# Patient Record
Sex: MALE | Race: WHITE | NOT HISPANIC OR LATINO | Employment: OTHER | URBAN - METROPOLITAN AREA
[De-identification: names, ages, dates, MRNs, and addresses within clinical notes are randomized per-mention and may not be internally consistent; named-entity substitution may affect disease eponyms.]

---

## 2017-01-13 NOTE — TELEPHONE ENCOUNTER
LOV 10/16. At that time patient was taking Glucophage XR 750mg tid. He is now requesting a refill for Metformin 1000 mg bid.  He has not returned my phone calls to clarify dosage with him. Please advise.

## 2017-01-14 ENCOUNTER — APPOINTMENT (OUTPATIENT)
Dept: RADIOLOGY | Facility: IMAGING CENTER | Age: 62
End: 2017-01-14
Attending: FAMILY MEDICINE
Payer: OTHER MISCELLANEOUS

## 2017-01-14 ENCOUNTER — OFFICE VISIT (OUTPATIENT)
Dept: URGENT CARE | Facility: PHYSICIAN GROUP | Age: 62
End: 2017-01-14
Payer: OTHER MISCELLANEOUS

## 2017-01-14 VITALS
DIASTOLIC BLOOD PRESSURE: 72 MMHG | HEART RATE: 88 BPM | SYSTOLIC BLOOD PRESSURE: 132 MMHG | OXYGEN SATURATION: 96 % | BODY MASS INDEX: 27 KG/M2 | TEMPERATURE: 97.9 F | WEIGHT: 168 LBS | HEIGHT: 66 IN | RESPIRATION RATE: 16 BRPM

## 2017-01-14 DIAGNOSIS — T14.8XXA FOREIGN BODY IN SKIN: ICD-10-CM

## 2017-01-14 DIAGNOSIS — L03.019 FELON OF FINGER: ICD-10-CM

## 2017-01-14 PROCEDURE — 99214 OFFICE O/P EST MOD 30 MIN: CPT | Performed by: FAMILY MEDICINE

## 2017-01-14 PROCEDURE — 73140 X-RAY EXAM OF FINGER(S): CPT | Mod: TC,LT | Performed by: FAMILY MEDICINE

## 2017-01-14 RX ORDER — CLINDAMYCIN HYDROCHLORIDE 300 MG/1
300 CAPSULE ORAL 3 TIMES DAILY
Qty: 21 CAP | Refills: 0 | Status: SHIPPED | OUTPATIENT
Start: 2017-01-14 | End: 2017-01-21

## 2017-01-14 RX ORDER — IBUPROFEN 600 MG/1
600 TABLET ORAL EVERY 8 HOURS PRN
Qty: 20 TAB | Refills: 0 | Status: SHIPPED | OUTPATIENT
Start: 2017-01-14 | End: 2018-08-27

## 2017-01-14 ASSESSMENT — ENCOUNTER SYMPTOMS
HEMOPTYSIS: 0
SHORTNESS OF BREATH: 0
FEVER: 0
FOCAL WEAKNESS: 0
CHILLS: 0
DIZZINESS: 0
ORTHOPNEA: 0

## 2017-01-14 NOTE — PROGRESS NOTES
"Subjective:      Jerome eMndoza is a 61 y.o. male who presents with Foreign Body in Skin    Chief Complaint   Patient presents with   • Foreign Body in Skin     left hand middle finger, swelling pt felt a fine object under skin before the swelling        - Lt middle finger w/ selling and redness. Thinks he has a FB in it. No trauma or known injury though.     - Hx DM, says he is well controlled on current meds               Foreign Body in Skin  Pertinent negatives include no chest pain, chills or fever.       Review of Systems   Constitutional: Negative for fever and chills.   Respiratory: Negative for hemoptysis and shortness of breath.    Cardiovascular: Negative for chest pain and orthopnea.   Neurological: Negative for dizziness and focal weakness.          Objective:     /72 mmHg  Pulse 88  Temp(Src) 36.6 °C (97.9 °F)  Resp 16  Ht 1.676 m (5' 5.98\")  Wt 76.204 kg (168 lb)  BMI 27.13 kg/m2  SpO2 96%     Physical Exam   Constitutional: He appears well-developed. No distress.   HENT:   Head: Normocephalic and atraumatic.   Cardiovascular: Regular rhythm.    No murmur heard.  Pulmonary/Chest: Effort normal and breath sounds normal.   Neurological: He is alert.   Skin: Skin is warm and dry.   Psychiatric: He has a normal mood and affect. Judgment normal.   Nursing note and vitals reviewed.  Lt middle finger: distal volar pad w/ some edema erythema, no PW marks or signs of FB. Suspect this is more of an early Felon formation             Assessment/Plan:         1. Foreign body in skin  DX-FINGER(S) 2+ LEFT    clindamycin (CLEOCIN) 300 MG Cap    ibuprofen (MOTRIN) 600 MG Tab    REFERRAL TO ORTHOPEDICS   2. Felon of finger  clindamycin (CLEOCIN) 300 MG Cap    ibuprofen (MOTRIN) 600 MG Tab    REFERRAL TO ORTHOPEDICS     Xray: no acute findings by MY READ. RADIOLOGY READ PENDING.   Warm soaks, abx, elevate limb.  2 day recheck       Dx & d/c instructions discussed w/ patient and/or family members. Follow up " w/ Prvt Dr or here in 3-4 days if not getting better, sooner if needed,  ER if worse and UC/PCP unavailable.        Possible side effects (i.e. Rash, GI upset/constipation, sedation, elevation of BP or sugars) of any medications given discussed.

## 2017-01-14 NOTE — MR AVS SNAPSHOT
"        Jerome Mendoza   2017 9:10 AM   Office Visit   MRN: [id number]    Department:  [facility]   Dept Phone:  [phone]    Description:  Male : 1955   Provider:  Enrique Holliday M.D.           Reason for Visit     Foreign Body in Skin left hand middle finger, swelling pt felt a fine object under skin before the swelling      Allergies as of 2017     Allergen Noted Reactions    Penicillin G 2014   Swelling      You were diagnosed with     Foreign body in skin   [[id number]]       Felon of finger   [[id number]]         Vital Signs     Blood Pressure Pulse Temperature Respirations Height Weight    132/72 mmHg 88 36.6 °C (97.9 °F) 16 1.676 m (5' 5.98\") 76.204 kg (168 lb)    Body Mass Index Oxygen Saturation Smoking Status             27.13 kg/m2 96% Never Smoker          Basic Information     Date Of Birth Sex Race Ethnicity Preferred Language    1955 Male White Non- English      Your appointments     Mar 16, 2017  8:00 AM   Diabetes Care Visit with Melissa Eli D.O., [place] [facility] RN   [facility])    [address], [address]  [place] [id number] [phone]           You will be receiving a confirmation call a few days before your appointment from our automated call confirmation system.              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   10/20/2016 - Present      Health Maintenance        Date Due Completion Dates    COLON CANCER SCREENING " ANNUAL FIT 1955 ---    IMM DTaP/Tdap/Td Vaccine (1 - Tdap) 10/26/1972 ---    IMM ZOSTER VACCINE 10/26/2013 ---    A1C SCREENING 4/20/2017 10/20/2016, 7/18/2016, 11/30/2015, 7/13/2015, 1/26/2015, 9/2/2014    FASTING LIPID PROFILE 7/18/2017 7/18/2016, 11/30/2015, 1/26/2015, 9/2/2014    URINE ACR / MICROALBUMIN 7/18/2017 7/18/2016, 11/30/2015, 9/2/2014    SERUM CREATININE 7/18/2017 7/18/2016, 11/30/2015, 9/2/2014    RETINAL SCREENING 8/2/2017 8/2/2016, 5/2/2015    DIABETES MONOFILAMTENT / LE EXAM 10/20/2017 10/20/2016, 12/7/2015            Current Immunizations     Influenza Vaccine Quad Inj (Preserved) 10/20/2016    Pneumococcal polysaccharide vaccine (PPSV-23) 10/20/2016      Below and/or attached are the medications your provider expects you to take. Review all of your home medications and newly ordered medications with your provider and/or pharmacist. Follow medication instructions as directed by your provider and/or pharmacist. Please keep your medication list with you and share with your provider. Update the information when medications are discontinued, doses are changed, or new medications (including over-the-counter products) are added; and carry medication information at all times in the event of emergency situations     Allergies:  PENICILLIN G - Swelling               Medications  Valid as of: January 14, 2017 - 10:44 AM    Generic Name Brand Name Tablet Size Instructions for use    Albuterol Sulfate (Aero Soln) VENTOLIN  (90 BASE) MCG/ACT INHALE TWO PUFFS BY MOUTH EVERY 6 HOURS AS NEEDED FOR SHORTNESS OF BREATH        Aspirin (Tablet Delayed Response) ECOTRIN 81 MG Take 162 mg by mouth every day.        Cholecalciferol (Cap) Vitamin D3 2000 UNIT Take  by mouth.        Clindamycin HCl (Cap) CLEOCIN 300 MG Take 1 Cap by mouth 3 times a day for 7 days.        GlipiZIDE (Tab) GLUCOTROL 10 MG TAKE ONE TABLET BY MOUTH TWICE DAILY        Ibuprofen (Tab) MOTRIN 600 MG Take 1 Tab by mouth every 8 hours  as needed.        Lisinopril (Tab) PRINIVIL 2.5 MG Take 1 Tab by mouth every day.        Lovastatin (Tab) MEVACOR 40 MG Take 1 Tab by mouth every day.        MetFORMIN HCl (TABLET SR 24 HR) GLUCOPHAGE  MG Take 1 Tab by mouth 3 times a day.        MetFORMIN HCl (Tab) GLUCOPHAGE 1000 MG TAKE ONE TABLET BY MOUTH TWICE DAILY WITH FOOD        Mometasone Furoate (AEROSOL POWDER, BREATH ACTIVATED) ASMANEX 220 MCG/INH Inhale 2 Puffs by mouth every day.        Pioglitazone HCl (Tab) ACTOS 45 MG Take 1 Tab by mouth every day.        SAXagliptin HCl (Tab) SAXagliptin HCl 5 MG Take 1 Tab by mouth 1 time daily as needed.        .                 Medicines prescribed today were sent to:     [facility] [address] [place] [address] - [address] [place] [address]    Phone: [phone] Fax: [phone]    Open 24 Hours?: No      Medication refill instructions:       If your prescription bottle indicates you have medication refills left, it is not necessary to call your provider’s office. Please contact your pharmacy and they will refill your medication.    If your prescription bottle indicates you do not have any refills left, you may request refills at any time through one of the following ways: The online [email or web address] system (except Urgent Care), by calling your provider’s office, or by asking your pharmacy to contact your provider’s office with a refill request. Medication refills are processed only during regular business hours and may not be available until the next business day. Your provider may request additional information or to have a follow-up visit with you prior to refilling your medication.   *Please Note: Medication refills are assigned a new Rx number when refilled electronically. Your pharmacy may indicate that no refills were authorized even though a new prescription for the same medication is available at the pharmacy. Please request the medicine by name with the pharmacy before  contacting your provider for a refill.        Your To Do List     Future Labs/Procedures Complete By Expires    DX-FINGER(S) 2+ LEFT  As directed 1/14/2018      Referral     A referral request has been sent to our patient care coordination department. Please allow 3-5 business days for us to process this request and contact you either by phone or mail. If you do not hear from us by the 5th business day, please call us at [phone].           Loudcaster Access Code: [email or web address]  Expires: 2/6/2017  8:21 AM    Loudcaster  A secure, online tool to manage your health information     [employer]’s Loudcaster® is a secure, online tool that connects you to your personalized health information from the privacy of your home -- day or night - making it very easy for you to manage your healthcare. Once the activation process is completed, you can even access your medical information using the Loudcaster luis, which is available for free in the Apple Luis store or Google Play store.     Loudcaster provides the following levels of access (as shown below):   My Chart Features   [facility] Primary Care Doctor [facility]  Specialists [facility]  Urgent  Care Non-[facility]  Primary Care  Doctor   Email your healthcare team securely and privately 24/7 X X X    Manage appointments: schedule your next appointment; view details of past/upcoming appointments X      Request prescription refills. X      View recent personal medical records, including lab and immunizations X X X X   View health record, including health history, allergies, medications X X X X   Read reports about your outpatient visits, procedures, consult and ER notes X X X X   See your discharge summary, which is a recap of your hospital and/or ER visit that includes your diagnosis, lab results, and care plan. X X       How to register for Loudcaster:  1. Go to  [email or web address].  2. Click on the Sign Up Now box, which takes you to the New Member Sign Up page. You will need to provide  the following information:  a. Enter your MadeClose Access Code exactly as it appears at the top of this page. (You will not need to use this code after you’ve completed the sign-up process. If you do not sign up before the expiration date, you must request a new code.)   b. Enter your date of birth.   c. Enter your home email address.   d. Click Submit, and follow the next screen’s instructions.  3. Create a MadeClose ID. This will be your MadeClose login ID and cannot be changed, so think of one that is secure and easy to remember.  4. Create a MadeClose password. You can change your password at any time.  5. Enter your Password Reset Question and Answer. This can be used at a later time if you forget your password.   6. Enter your e-mail address. This allows you to receive e-mail notifications when new information is available in MadeClose.  7. Click Sign Up. You can now view your health information.    For assistance activating your MadeClose account, call [phone]

## 2017-01-23 NOTE — TELEPHONE ENCOUNTER
Was the patient seen in the last year in this department? Yes     Does patient have an active prescription for medications requested? No     Received Request Via: Patient     PT HAS BEEN GETTING TEST STRIPS FROM OTHER MEANS. HE CAN NOT AFFORD THOSE MEANS SO HE WOULD LIKE TO BE PRESCRIBE ONE TOUCH ULTRA TEST STRIPS.  PLEASE EDIT RX. SEND TO [facility]. THANK YOU!

## 2017-02-22 NOTE — TELEPHONE ENCOUNTER
Was the patient seen in the last year in this department? Yes     Does patient have an active prescription for medications requested? No     Received Request Via: Pharmacy      Pt met protocol?: Yes, LABS 7/16 A1C 8.5 OV 1/17,  SENT RX TO PHARMACY #90 WITH 4 REFILLS SIG:BID,PHARMACY IS ASKING FOR #180 FOR 90 DS WITH REFILLS

## 2017-03-06 ENCOUNTER — HOSPITAL ENCOUNTER (OUTPATIENT)
Dept: LAB | Facility: MEDICAL CENTER | Age: 62
End: 2017-03-06
Attending: FAMILY MEDICINE
Payer: OTHER MISCELLANEOUS

## 2017-03-06 LAB
CHOLEST SERPL-MCNC: 171 MG/DL (ref 100–199)
HDLC SERPL-MCNC: 43 MG/DL
LDLC SERPL CALC-MCNC: 104 MG/DL
TRIGL SERPL-MCNC: 122 MG/DL (ref 0–149)

## 2017-03-06 PROCEDURE — 36415 COLL VENOUS BLD VENIPUNCTURE: CPT

## 2017-03-06 PROCEDURE — 80061 LIPID PANEL: CPT

## 2017-04-06 ENCOUNTER — TELEPHONE (OUTPATIENT)
Dept: MEDICAL GROUP | Facility: PHYSICIAN GROUP | Age: 62
End: 2017-04-06

## 2017-04-07 NOTE — TELEPHONE ENCOUNTER
Pt lm asking if he could receive results of his A1c test done 10-20-16. Informed him he could present to our location and we could print for him. Let him know that he may need to fill out some paperwork.

## 2017-04-23 ENCOUNTER — TELEPHONE (OUTPATIENT)
Dept: URGENT CARE | Facility: PHYSICIAN GROUP | Age: 62
End: 2017-04-23

## 2017-04-23 ENCOUNTER — OFFICE VISIT (OUTPATIENT)
Dept: URGENT CARE | Facility: CLINIC | Age: 62
End: 2017-04-23

## 2017-04-23 VITALS
DIASTOLIC BLOOD PRESSURE: 80 MMHG | TEMPERATURE: 97.8 F | BODY MASS INDEX: 26.68 KG/M2 | SYSTOLIC BLOOD PRESSURE: 132 MMHG | HEART RATE: 74 BPM | HEIGHT: 66 IN | WEIGHT: 166 LBS | RESPIRATION RATE: 14 BRPM | OXYGEN SATURATION: 97 %

## 2017-04-23 DIAGNOSIS — Z12.5 PROSTATE CANCER SCREENING: ICD-10-CM

## 2017-04-23 DIAGNOSIS — Z02.89 ENCOUNTER FOR EXAMINATION REQUIRED BY DEPARTMENT OF TRANSPORTATION (DOT): ICD-10-CM

## 2017-04-23 DIAGNOSIS — E11.3399: ICD-10-CM

## 2017-04-23 DIAGNOSIS — Z11.59 NEED FOR HEPATITIS C SCREENING TEST: ICD-10-CM

## 2017-04-23 PROCEDURE — 7100 PR DOT PHYSICAL: Performed by: NURSE PRACTITIONER

## 2017-04-23 ASSESSMENT — VISUAL ACUITY
OS_CC: 20/40
OD_CC: 20/40

## 2017-04-23 NOTE — TELEPHONE ENCOUNTER
Patient requesting lab orders for a full work up and he states based on the results he will then make an appointment with Dr Eli. Please advise if labs can be ordered.

## 2017-04-23 NOTE — MR AVS SNAPSHOT
"        Jerome Mendoza   2017 10:45 AM   Office Visit   MRN: [id number]    Department:  [facility]   Dept Phone:  [phone]    Description:  Male : 1955   Provider:  GRIS Jung           Reason for Visit     Employment Physical dot physical .      Allergies as of 2017     Allergen Noted Reactions    Penicillin G 2014   Swelling      Vital Signs     Blood Pressure Pulse Temperature Respirations Height Weight    132/80 mmHg 74 36.6 °C (97.8 °F) 14 1.676 m (5' 5.98\") 75.297 kg (166 lb)    Body Mass Index Oxygen Saturation Smoking Status             26.81 kg/m2 97% Never Smoker          Basic Information     Date Of Birth Sex Race Ethnicity Preferred Language    1955 Male White Non- English      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   10/20/2016 - Present      Health Maintenance        Date Due Completion Dates    COLON CANCER SCREENING ANNUAL FIT 1955 ---    IMM DTaP/Tdap/Td Vaccine (1 - Tdap) 10/26/1972 ---    IMM ZOSTER VACCINE 10/26/2013 ---    A1C SCREENING 2017 10/20/2016, 2016, 2015, 2015, 2015, 2014    URINE ACR / MICROALBUMIN 2017, 2015, 2014    SERUM CREATININE 2017, 2015, 2014    RETINAL SCREENING 2017, 2015    DIABETES MONOFILAMENT / LE EXAM 10/20/2017 10/20/2016, 2015    FASTING LIPID PROFILE 3/6/2018 3/6/2017, 2016, 2015, 2015, 2014            "   Current Immunizations     Influenza Vaccine Quad Inj (Preserved) 10/20/2016    Pneumococcal polysaccharide vaccine (PPSV-23) 10/20/2016      Below and/or attached are the medications your provider expects you to take. Review all of your home medications and newly ordered medications with your provider and/or pharmacist. Follow medication instructions as directed by your provider and/or pharmacist. Please keep your medication list with you and share with your provider. Update the information when medications are discontinued, doses are changed, or new medications (including over-the-counter products) are added; and carry medication information at all times in the event of emergency situations     Allergies:  PENICILLIN G - Swelling               Medications  Valid as of: April 23, 2017 - 11:58 AM    Generic Name Brand Name Tablet Size Instructions for use    Albuterol Sulfate (Aero Soln) VENTOLIN  (90 BASE) MCG/ACT INHALE TWO PUFFS BY MOUTH EVERY 6 HOURS AS NEEDED FOR SHORTNESS OF BREATH        Aspirin (Tablet Delayed Response) ECOTRIN 81 MG Take 162 mg by mouth every day.        Blood Glucose Monitoring Suppl (Misc) Blood Glucose Monitoring Suppl SUPPLIES Test strips order: Test strips for One Touch Ultra meter. Sig: use TID and prn ssx high or low sugar #100 RF x 3        Cholecalciferol (Cap) Vitamin D3 2000 UNIT Take  by mouth.        GlipiZIDE (Tab) GLUCOTROL 10 MG TAKE ONE TABLET BY MOUTH TWICE DAILY        Ibuprofen (Tab) MOTRIN 600 MG Take 1 Tab by mouth every 8 hours as needed.        Lisinopril (Tab) PRINIVIL 2.5 MG Take 1 Tab by mouth every day.        Lovastatin (Tab) MEVACOR 40 MG Take 1 Tab by mouth every day.        MetFORMIN HCl (TABLET SR 24 HR) GLUCOPHAGE  MG Take 1 Tab by mouth 3 times a day.        MetFORMIN HCl (Tab) GLUCOPHAGE 1000 MG TAKE ONE TABLET BY MOUTH TWICE DAILY WITH FOOD        Mometasone Furoate (AEROSOL POWDER, BREATH ACTIVATED) ASMANEX 220 MCG/INH Inhale 2 Puffs by  mouth every day.        Pioglitazone HCl (Tab) ACTOS 45 MG Take 1 Tab by mouth every day.        SAXagliptin HCl (Tab) SAXagliptin HCl 5 MG Take 1 Tab by mouth 1 time daily as needed.        .                 Medicines prescribed today were sent to:     [facility] [address] [place] [address], [place] [address]    [address] [place] [address]    Phone: [phone] Fax: [phone]    Open 24 Hours?: No      Medication refill instructions:       If your prescription bottle indicates you have medication refills left, it is not necessary to call your provider’s office. Please contact your pharmacy and they will refill your medication.    If your prescription bottle indicates you do not have any refills left, you may request refills at any time through one of the following ways: The online [employer] system (except Urgent Care), by calling your provider’s office, or by asking your pharmacy to contact your provider’s office with a refill request. Medication refills are processed only during regular business hours and may not be available until the next business day. Your provider may request additional information or to have a follow-up visit with you prior to refilling your medication.   *Please Note: Medication refills are assigned a new Rx number when refilled electronically. Your pharmacy may indicate that no refills were authorized even though a new prescription for the same medication is available at the pharmacy. Please request the medicine by name with the pharmacy before contacting your provider for a refill.           [employer] Access Code: [email or web address]  Expires: 5/3/2017  3:41 PM    [employer]  A secure, online tool to manage your health information     [employer]® is a secure, online tool that connects you to your personalized health information from the privacy of your home -- day or night - making it very easy for you to manage your healthcare. Once the activation process is completed,  you can even access your medical information using the Stootie luis, which is available for free in the Apple Luis store or Google Play store.     Stootie provides the following levels of access (as shown below):   My Chart Features   [facility] Primary Care Doctor [facility]  Specialists [facility]  Urgent  Care Non-[facility]  Primary Care  Doctor   Email your healthcare team securely and privately 24/7 X X X    Manage appointments: schedule your next appointment; view details of past/upcoming appointments X      Request prescription refills. X      View recent personal medical records, including lab and immunizations X X X X   View health record, including health history, allergies, medications X X X X   Read reports about your outpatient visits, procedures, consult and ER notes X X X X   See your discharge summary, which is a recap of your hospital and/or ER visit that includes your diagnosis, lab results, and care plan. X X       How to register for Stootie:  1. Go to  [email or web address].  2. Click on the Sign Up Now box, which takes you to the New Member Sign Up page. You will need to provide the following information:  a. Enter your Stootie Access Code exactly as it appears at the top of this page. (You will not need to use this code after you’ve completed the sign-up process. If you do not sign up before the expiration date, you must request a new code.)   b. Enter your date of birth.   c. Enter your home email address.   d. Click Submit, and follow the next screen’s instructions.  3. Create a Stootie ID. This will be your Stootie login ID and cannot be changed, so think of one that is secure and easy to remember.  4. Create a Stootie password. You can change your password at any time.  5. Enter your Password Reset Question and Answer. This can be used at a later time if you forget your password.   6. Enter your e-mail address. This allows you to receive e-mail notifications when new information is available in  [employer].  7. Click Sign Up. You can now view your health information.    For assistance activating your [employer] account, call [phone]

## 2017-04-24 ASSESSMENT — ENCOUNTER SYMPTOMS
WEAKNESS: 0
FEVER: 0
CHILLS: 0

## 2017-04-24 NOTE — PROGRESS NOTES
Subjective:      Jerome Mendoza is a 61 y.o. male who presents with Employment Physical            HPI  Jerome is a 61 year old male who is here for DOT physical. See paperwork. H/o diabetes without insulin. Last A1C 10/2016: 7.4 with last PCP appointment. Recent cataract both eyes in last 2 months, wears corrective glasses. Kidney values WNL in last blood work in 7/2016. Bilat hearing aids use.    PMH:  has a past medical history of Type II or unspecified type diabetes mellitus without mention of complication, not stated as uncontrolled (6/19/2014); Hyperlipidemia (6/19/2014); Asthma (6/19/2014); Hearing loss of both ears (6/19/2014); and Wears hearing aid (6/19/2014).  MEDS:   Current outpatient prescriptions:   •  metformin (GLUCOPHAGE) 1000 MG tablet, TAKE ONE TABLET BY MOUTH TWICE DAILY WITH FOOD, Disp: 180 Tab, Rfl: 3  •  aspirin EC (ECOTRIN) 81 MG Tablet Delayed Response, Take 162 mg by mouth every day., Disp: , Rfl:   •  Cholecalciferol (VITAMIN D3) 2000 UNIT Cap, Take  by mouth., Disp: , Rfl:   •  lisinopril (PRINIVIL) 2.5 MG Tab, Take 1 Tab by mouth every day., Disp: 90 Tab, Rfl: 4  •  glipiZIDE (GLUCOTROL) 10 MG Tab, TAKE ONE TABLET BY MOUTH TWICE DAILY, Disp: 180 Tab, Rfl: 4  •  lovastatin (MEVACOR) 40 MG tablet, Take 1 Tab by mouth every day., Disp: 90 Tab, Rfl: 4  •  pioglitazone (ACTOS) 45 MG Tab, Take 1 Tab by mouth every day., Disp: 90 Tab, Rfl: 4  •  mometasone (ASMANEX 60 METERED DOSES) 220 MCG/INH inhaler, Inhale 2 Puffs by mouth every day., Disp: 3 Inhaler, Rfl: 0  •  Blood Glucose Monitoring Suppl SUPPLIES Misc, Test strips order: Test strips for One Touch Ultra meter. Sig: use TID and prn ssx high or low sugar #100 RF x 3, Disp: 100 Strip, Rfl: 3  •  ibuprofen (MOTRIN) 600 MG Tab, Take 1 Tab by mouth every 8 hours as needed., Disp: 20 Tab, Rfl: 0  •  VENTOLIN  (90 BASE) MCG/ACT Aero Soln inhalation aerosol, INHALE TWO PUFFS BY MOUTH EVERY 6 HOURS AS NEEDED FOR SHORTNESS OF BREATH, Disp: 1  "Inhaler, Rfl: 3  •  Saxagliptin HCl (ONGLYZA) 5 MG Tab, Take 1 Tab by mouth 1 time daily as needed., Disp: 30 Tab, Rfl: 12  •  metformin ER (GLUCOPHAGE XR) 750 MG TABLET SR 24 HR, Take 1 Tab by mouth 3 times a day., Disp: 270 Tab, Rfl: 4  ALLERGIES:   Allergies   Allergen Reactions   • Penicillin G Swelling     SURGHX:   Past Surgical History   Procedure Laterality Date   • Tonsillectomy and adenoidectomy     • Vasectomy       SOCHX:  reports that he has never smoked. He has never used smokeless tobacco. He reports that he does not drink alcohol or use illicit drugs.  FH: Family history was reviewed, no pertinent findings to report    Review of Systems   Constitutional: Negative for fever, chills and malaise/fatigue.   Neurological: Negative for weakness.   All other systems reviewed and are negative.         Objective:     /80 mmHg  Pulse 74  Temp(Src) 36.6 °C (97.8 °F)  Resp 14  Ht 1.676 m (5' 5.98\")  Wt 75.297 kg (166 lb)  BMI 26.81 kg/m2  SpO2 97%     Physical Exam   Constitutional: He appears well-developed and well-nourished. No distress.   Skin: He is not diaphoretic.   Vitals reviewed.              Assessment/Plan:     1. Encounter for examination required by Department of Transportation (DOT)    Patient due for yearly PCP exam with yearly bloodwork due in 7/2017  Renewal good for 1 year        "

## 2017-05-01 ENCOUNTER — HOSPITAL ENCOUNTER (OUTPATIENT)
Dept: LAB | Facility: MEDICAL CENTER | Age: 62
End: 2017-05-01
Attending: FAMILY MEDICINE
Payer: OTHER MISCELLANEOUS

## 2017-05-01 DIAGNOSIS — Z12.5 PROSTATE CANCER SCREENING: ICD-10-CM

## 2017-05-01 DIAGNOSIS — E11.3399: ICD-10-CM

## 2017-05-01 LAB
ALBUMIN SERPL BCP-MCNC: 4 G/DL (ref 3.2–4.9)
ALBUMIN/GLOB SERPL: 1.3 G/DL
ALP SERPL-CCNC: 50 U/L (ref 30–99)
ALT SERPL-CCNC: 14 U/L (ref 2–50)
ANION GAP SERPL CALC-SCNC: 8 MMOL/L (ref 0–11.9)
AST SERPL-CCNC: 14 U/L (ref 12–45)
BASOPHILS # BLD AUTO: 1.2 % (ref 0–1.8)
BASOPHILS # BLD: 0.05 K/UL (ref 0–0.12)
BILIRUB SERPL-MCNC: 0.7 MG/DL (ref 0.1–1.5)
BUN SERPL-MCNC: 12 MG/DL (ref 8–22)
CALCIUM SERPL-MCNC: 9.7 MG/DL (ref 8.5–10.5)
CHLORIDE SERPL-SCNC: 103 MMOL/L (ref 96–112)
CO2 SERPL-SCNC: 26 MMOL/L (ref 20–33)
CREAT SERPL-MCNC: 0.89 MG/DL (ref 0.5–1.4)
CREAT UR-MCNC: 111.8 MG/DL
EOSINOPHIL # BLD AUTO: 0.24 K/UL (ref 0–0.51)
EOSINOPHIL NFR BLD: 5.7 % (ref 0–6.9)
ERYTHROCYTE [DISTWIDTH] IN BLOOD BY AUTOMATED COUNT: 43.7 FL (ref 35.9–50)
EST. AVERAGE GLUCOSE BLD GHB EST-MCNC: 209 MG/DL
GFR SERPL CREATININE-BSD FRML MDRD: >60 ML/MIN/1.73 M 2
GLOBULIN SER CALC-MCNC: 3.2 G/DL (ref 1.9–3.5)
GLUCOSE SERPL-MCNC: 208 MG/DL (ref 65–99)
HBA1C MFR BLD: 8.9 % (ref 0–5.6)
HCT VFR BLD AUTO: 38.1 % (ref 42–52)
HCV AB SER QL: NEGATIVE
HGB BLD-MCNC: 13.2 G/DL (ref 14–18)
IMM GRANULOCYTES # BLD AUTO: 0.01 K/UL (ref 0–0.11)
IMM GRANULOCYTES NFR BLD AUTO: 0.2 % (ref 0–0.9)
LYMPHOCYTES # BLD AUTO: 1.13 K/UL (ref 1–4.8)
LYMPHOCYTES NFR BLD: 27 % (ref 22–41)
MCH RBC QN AUTO: 31.9 PG (ref 27–33)
MCHC RBC AUTO-ENTMCNC: 34.6 G/DL (ref 33.7–35.3)
MCV RBC AUTO: 92 FL (ref 81.4–97.8)
MICROALBUMIN UR-MCNC: 6.2 MG/DL
MICROALBUMIN/CREAT UR: 55 MG/G (ref 0–30)
MONOCYTES # BLD AUTO: 0.37 K/UL (ref 0–0.85)
MONOCYTES NFR BLD AUTO: 8.9 % (ref 0–13.4)
NEUTROPHILS # BLD AUTO: 2.38 K/UL (ref 1.82–7.42)
NEUTROPHILS NFR BLD: 57 % (ref 44–72)
NRBC # BLD AUTO: 0 K/UL
NRBC BLD AUTO-RTO: 0 /100 WBC
PLATELET # BLD AUTO: 254 K/UL (ref 164–446)
PMV BLD AUTO: 10 FL (ref 9–12.9)
POTASSIUM SERPL-SCNC: 5 MMOL/L (ref 3.6–5.5)
PROT SERPL-MCNC: 7.2 G/DL (ref 6–8.2)
PSA SERPL-MCNC: 0.9 NG/ML (ref 0–4)
RBC # BLD AUTO: 4.14 M/UL (ref 4.7–6.1)
SODIUM SERPL-SCNC: 137 MMOL/L (ref 135–145)
WBC # BLD AUTO: 4.2 K/UL (ref 4.8–10.8)

## 2017-05-01 PROCEDURE — 82570 ASSAY OF URINE CREATININE: CPT

## 2017-05-01 PROCEDURE — 85025 COMPLETE CBC W/AUTO DIFF WBC: CPT

## 2017-05-01 PROCEDURE — 84153 ASSAY OF PSA TOTAL: CPT

## 2017-05-01 PROCEDURE — 83036 HEMOGLOBIN GLYCOSYLATED A1C: CPT

## 2017-05-01 PROCEDURE — 36415 COLL VENOUS BLD VENIPUNCTURE: CPT

## 2017-05-01 PROCEDURE — 82043 UR ALBUMIN QUANTITATIVE: CPT

## 2017-05-01 PROCEDURE — 86803 HEPATITIS C AB TEST: CPT

## 2017-05-01 PROCEDURE — 80053 COMPREHEN METABOLIC PANEL: CPT

## 2017-05-04 ENCOUNTER — TELEPHONE (OUTPATIENT)
Dept: MEDICAL GROUP | Facility: PHYSICIAN GROUP | Age: 62
End: 2017-05-04

## 2017-05-08 ENCOUNTER — TELEPHONE (OUTPATIENT)
Dept: URGENT CARE | Facility: CLINIC | Age: 62
End: 2017-05-08

## 2017-06-19 ENCOUNTER — OFFICE VISIT (OUTPATIENT)
Dept: MEDICAL GROUP | Facility: PHYSICIAN GROUP | Age: 62
End: 2017-06-19
Payer: OTHER MISCELLANEOUS

## 2017-06-19 VITALS
SYSTOLIC BLOOD PRESSURE: 116 MMHG | HEART RATE: 80 BPM | RESPIRATION RATE: 12 BRPM | OXYGEN SATURATION: 98 % | HEIGHT: 65 IN | WEIGHT: 164 LBS | TEMPERATURE: 98.5 F | DIASTOLIC BLOOD PRESSURE: 64 MMHG | BODY MASS INDEX: 27.32 KG/M2

## 2017-06-19 DIAGNOSIS — H35.00 RETINOPATHY OF RIGHT EYE: ICD-10-CM

## 2017-06-19 DIAGNOSIS — Z12.11 SCREENING FOR COLON CANCER: ICD-10-CM

## 2017-06-19 DIAGNOSIS — E78.2 MIXED HYPERLIPIDEMIA: ICD-10-CM

## 2017-06-19 DIAGNOSIS — R80.9 TYPE 2 DIABETES MELLITUS WITH ALBUMINURIA (HCC): ICD-10-CM

## 2017-06-19 DIAGNOSIS — E11.29 TYPE 2 DIABETES MELLITUS WITH ALBUMINURIA (HCC): ICD-10-CM

## 2017-06-19 PROCEDURE — 99214 OFFICE O/P EST MOD 30 MIN: CPT | Performed by: FAMILY MEDICINE

## 2017-06-19 RX ORDER — SAXAGLIPTIN 5 MG/1
1 TABLET, FILM COATED ORAL DAILY
Qty: 90 TAB | Refills: 3 | Status: SHIPPED | OUTPATIENT
Start: 2017-06-19 | End: 2018-07-25 | Stop reason: SDUPTHER

## 2017-06-19 ASSESSMENT — PATIENT HEALTH QUESTIONNAIRE - PHQ9: CLINICAL INTERPRETATION OF PHQ2 SCORE: 0

## 2017-06-19 ASSESSMENT — PAIN SCALES - GENERAL: PAINLEVEL: NO PAIN

## 2017-06-19 NOTE — MR AVS SNAPSHOT
"        Jerome Mendoza   2017 9:00 AM   Office Visit   MRN: [id number]    Department:  [facility]   Dept Phone:  [phone]    Description:  Male : 1955   Provider:  Melissa Eli D.O.           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[facility])   [1803999]       Mixed hyperlipidemia   [272.2.ICD-9-CM]       Retinopathy of right eye   [280487]       Screening for colon cancer   [836817]       Controlled type 2 diabetes mellitus with retinopathy of right eye, without long-term current use of insulin, macular edema presence unspecified, unspecified retinopathy severity (CMS-[facility])   [0406328]         Vital Signs     Blood Pressure Pulse Temperature Respirations Height Weight    116/64 mmHg 80 36.9 °C (98.5 °F) 12 1.66 m (5' 5.35\") 74.39 kg (164 lb)    Body Mass Index Oxygen Saturation Smoking Status             27.00 kg/m2 98% Never Smoker          Basic Information     Date Of Birth Sex Race Ethnicity Preferred Language    1955 Male White Non- English      Your appointments     Sep 19, 2017 10:00 AM   Established Patient with Melissa Eli D.O.   [address], [address]  [facility] [address] [phone]           You will be receiving a confirmation call a few days before your appointment from our automated call confirmation system.              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[facility]) E11.29, R80.9   10/20/2016 - Present      Health Maintenance        Date Due Completion Dates   " COLON CANCER SCREENING ANNUAL FIT 1955 ---    IMM DTaP/Tdap/Td Vaccine (1 - Tdap) 10/26/1972 ---    IMM ZOSTER VACCINE 10/26/2013 ---    RETINAL SCREENING 8/2/2017 8/2/2016, 5/2/2015    DIABETES MONOFILAMENT / LE EXAM 10/20/2017 10/20/2016, 12/7/2015    A1C SCREENING 11/1/2017 5/1/2017, 10/20/2016, 7/18/2016, 11/30/2015, 7/13/2015, 1/26/2015, 9/2/2014    FASTING LIPID PROFILE 3/6/2018 3/6/2017, 7/18/2016, 11/30/2015, 1/26/2015, 9/2/2014    URINE ACR / MICROALBUMIN 5/1/2018 5/1/2017, 7/18/2016, 11/30/2015, 9/2/2014    SERUM CREATININE 5/1/2018 5/1/2017, 7/18/2016, 11/30/2015, 9/2/2014            Current Immunizations     Influenza Vaccine Quad Inj (Preserved) 10/20/2016    Pneumococcal polysaccharide vaccine (PPSV-23) 10/20/2016      Below and/or attached are the medications your provider expects you to take. Review all of your home medications and newly ordered medications with your provider and/or pharmacist. Follow medication instructions as directed by your provider and/or pharmacist. Please keep your medication list with you and share with your provider. Update the information when medications are discontinued, doses are changed, or new medications (including over-the-counter products) are added; and carry medication information at all times in the event of emergency situations     Allergies:  PENICILLIN G - Swelling               Medications  Valid as of: June 19, 2017 -  9:49 AM    Generic Name Brand Name Tablet Size Instructions for use    Albuterol Sulfate (Aero Soln) VENTOLIN  (90 BASE) MCG/ACT INHALE TWO PUFFS BY MOUTH EVERY 6 HOURS AS NEEDED FOR SHORTNESS OF BREATH        Aspirin (Tablet Delayed Response) ECOTRIN 81 MG Take 1 Tab by mouth every day.        Blood Glucose Monitoring Suppl (Misc) Blood Glucose Monitoring Suppl SUPPLIES Test strips order: Test strips for One Touch Ultra meter. Sig: use TID and prn ssx high or low sugar #100 RF x 3        Cholecalciferol (Cap) Vitamin D3 2000 UNIT  Take  by mouth.        GlipiZIDE (Tab) GLUCOTROL 10 MG TAKE ONE TABLET BY MOUTH TWICE DAILY        Ibuprofen (Tab) MOTRIN 600 MG Take 1 Tab by mouth every 8 hours as needed.        Lisinopril (Tab) PRINIVIL 2.5 MG Take 1 Tab by mouth every day.        Lovastatin (Tab) MEVACOR 40 MG Take 1 Tab by mouth every day.        MetFORMIN HCl (Tab) GLUCOPHAGE 1000 MG TAKE ONE TABLET BY MOUTH TWICE DAILY WITH FOOD        Mometasone Furoate (AEROSOL POWDER, BREATH ACTIVATED) ASMANEX 220 MCG/INH Inhale 2 Puffs by mouth every day.        Pioglitazone HCl (Tab) ACTOS 45 MG Take 1 Tab by mouth every day.        SAXagliptin HCl (Tab) SAXagliptin HCl 5 MG Take 1 Tab by mouth every day.        .                 Medicines prescribed today were sent to:     [facility] [address] [place] [address], [place] - [address] [place] [address]    Phone: [phone] Fax: [phone]    Open 24 Hours?: No      Medication refill instructions:       If your prescription bottle indicates you have medication refills left, it is not necessary to call your provider’s office. Please contact your pharmacy and they will refill your medication.    If your prescription bottle indicates you do not have any refills left, you may request refills at any time through one of the following ways: The online [employer] system (except Urgent Care), by calling your provider’s office, or by asking your pharmacy to contact your provider’s office with a refill request. Medication refills are processed only during regular business hours and may not be available until the next business day. Your provider may request additional information or to have a follow-up visit with you prior to refilling your medication.   *Please Note: Medication refills are assigned a new Rx number when refilled electronically. Your pharmacy may indicate that no refills were authorized even though a new prescription for the same medication is available at the pharmacy. Please  request the medicine by name with the pharmacy before contacting your provider for a refill.        Your To Do List     Future Labs/Procedures Complete By Expires    OCCULT BLOOD FECES IMMUNOASSAY  As directed 6/19/2018         BrandMaker Access Code: Activation code not generated  Current BrandMaker Status: Active

## 2017-06-19 NOTE — PROGRESS NOTES
Subjective:     Chief Complaint   Patient presents with   • Establish Care     F/V LABS       Jerome Mendoza is a 61 y.o. male here today to [facility] care with new provider. Patient is a former patient of Dr. Webster who is since retired.    Patient was last seen 10/20/16 by Dr. Webster.  Patient had labs on 5/1/17. A1c was noted to be 8.9. Pt has been under stress and stopped Onglyza during the time A1c became elevated. Pt is currently under treatment for retinopathy and cataracts. 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 Pt is taking  Metformin TID, glipizide twice a day, and Actos daily. Patient does not take home blood sugars as he finds high numbers very stressful. No side effects reported such as abdominal pain, dark tarry stool, hematochezia, headache, shakiness, or lightheadedness due to low blood sugar.  Patient denies chest pain, numbness and tingling in hands and feet, change in sensation in hands or feet, sores on feet, change in urine,  or change in vision.        Allergies   Allergen Reactions   • Penicillin G Swelling     Current medicines (including changes today)  Current Outpatient Prescriptions   Medication Sig Dispense Refill   • aspirin EC (ECOTRIN) 81 MG Tablet Delayed Response Take 1 Tab by mouth every day. 90 Tab 0   • SAXagliptin HCl (ONGLYZA) 5 MG Tab Take 1 Tab by mouth every day. 90 Tab 3   • metformin (GLUCOPHAGE) 1000 MG tablet TAKE ONE TABLET BY MOUTH TWICE DAILY WITH FOOD 180 Tab 3   • mometasone (ASMANEX 60 METERED DOSES) 220 MCG/INH inhaler Inhale 2 Puffs by mouth every day. 3 Inhaler 0   • Blood Glucose Monitoring Suppl SUPPLIES Misc Test strips order: Test strips for One Touch Ultra meter. Sig: use TID and prn ssx high or low sugar #100 RF x 3 100 Strip 3   • ibuprofen (MOTRIN) 600 MG Tab Take 1 Tab by mouth every 8 hours as needed. 20 Tab 0   • lisinopril (PRINIVIL) 2.5 MG Tab Take 1 Tab by mouth every day. 90 Tab 4  "  • VENTOLIN  (90 BASE) MCG/ACT Aero Soln inhalation aerosol INHALE TWO PUFFS BY MOUTH EVERY 6 HOURS AS NEEDED FOR SHORTNESS OF BREATH 1 Inhaler 3   • glipiZIDE (GLUCOTROL) 10 MG Tab TAKE ONE TABLET BY MOUTH TWICE DAILY 180 Tab 4   • lovastatin (MEVACOR) 40 MG tablet Take 1 Tab by mouth every day. 90 Tab 4   • pioglitazone (ACTOS) 45 MG Tab Take 1 Tab by mouth every day. 90 Tab 4   • Cholecalciferol (VITAMIN D3) 2000 UNIT Cap Take  by mouth.       No current facility-administered medications for this visit.     Social History   Substance Use Topics   • Smoking status: Never Smoker    • Smokeless tobacco: Never Used      Comment: avoid all tobacco products   • Alcohol Use: No     Family Status   Relation Status Death Age   • Mother Alive    • Father       Family History   Problem Relation Age of Onset   • Asthma Mother    • Diabetes Father    • Psychiatry Father      dementia   • Heart Disease Neg Hx    • Stroke Neg Hx    • Cancer Neg Hx      He    has a past medical history of Type II or unspecified type diabetes mellitus without mention of complication, not stated as uncontrolled (2014); Hyperlipidemia (2014); Asthma (2014); Hearing loss of both ears (2014); and Wears hearing aid (2014).        ROS  GEN: no weight loss, fevers, or chills  HEENT: Right eye diminished visual acuity, no ear pain, no difficulty swallowing, no throat pain, no runny nose, no nasal congestion  Resp: no shortness of breath, no cough  CV: no racing heart, no irregular beats, no chest pain  Abd: no nausea, no vomiting, no constipation, no blood in stool, no dark stools, no incontinence  : no dysuria, no hematuria, no urinary incontinence, no increased frequency  Neuro: no headaches, no dizziness, no LOC, no weakness, no numbness/tingling       Objective:     Blood pressure 116/64, pulse 80, temperature 36.9 °C (98.5 °F), resp. rate 12, height 1.66 m (5' 5.35\"), weight 74.39 kg (164 lb), SpO2 98 %. " Body mass index is 27 kg/(m^2). Physical Exam:  Constitutional: Alert, no distress.  Skin: Warm, dry, good turgor, no rashes in visible areas.  Eye: Equal, round and reactive, conjunctiva clear, lids normal, wears glasses.  ENMT: Lips without lesions, oropharynx non-erythematous, no exudate, moist oral mucosa  Neck: Trachea midline, no masses, no thyromegaly. No cervical or supraclavicular lymphadenopathy. Full ROM  Respiratory: Unlabored respiratory effort, good air movement, lungs clear to auscultation, no wheezes, no ronchi.  Cardiovascular:RRR, +S1, S2, no murmur, no peripheral edema, pedal and radial pulses equal and intact bilat  Abdomen: Soft, non-tender, no masses, no hepatosplenomegaly.  Psych: Alert and oriented x3, appropriate affect and mood.  Neuro: CN2-12 intact, no gross motor or sensory deficits      Assessment and Plan:   The following treatment plan was discussed    1. 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   Pt also understands how to count carbs with a goal for 50 carbs per meal. The pt was advised to set a goal for each meal to be 25%carbs, 25% protein, and 50% fruits/veggies. Pt also advised to look at ADA website for further information. Patient to monitor sugars daily    Pt is currently on Metformin, Actos, glipizide ASA, ACEi, and statin.   Patient stopped Onglyza. Recommend restarting    - aspirin EC (ECOTRIN) 81 MG Tablet Delayed Response; Take 1 Tab by mouth every day.  Dispense: 90 Tab; Refill: 0  - SAXagliptin HCl (ONGLYZA) 5 MG Tab; Take 1 Tab by mouth every day.  Dispense: 90 Tab; Refill: 3    2. Mixed hyperlipidemia  . Continue statin    3. Retinopathy of  right eye  Continue to follow with ophthalmology    4. Screening for colon cancer  - OCCULT BLOOD FECES IMMUNOASSAY; Future      Followup: Return in about 3 months (around 9/19/2017) for F/U DM.    Please note that this dictation was created using voice recognition software. I have made every reasonable attempt to correct obvious errors, but I expect that there are errors of grammar and possibly content that I did not discover before finalizing the note.

## 2017-09-18 DIAGNOSIS — E11.319 CONTROLLED TYPE 2 DIABETES MELLITUS WITH RETINOPATHY WITHOUT MACULAR EDEMA, WITHOUT LONG-TERM CURRENT USE OF INSULIN, UNSPECIFIED LATERALITY, UNSPECIFIED RETINOPATHY SEVERITY (HCC): ICD-10-CM

## 2017-09-18 RX ORDER — LOVASTATIN 40 MG/1
40 TABLET ORAL DAILY
Qty: 90 TAB | Refills: 1 | Status: SHIPPED | OUTPATIENT
Start: 2017-09-18 | End: 2017-09-19 | Stop reason: SDUPTHER

## 2017-09-18 NOTE — TELEPHONE ENCOUNTER
Pt has had OV within the 12 month protocol and lipid panel is current. 6 month supply sent to pharmacy.   Lab Results   Component Value Date/Time    CHOLSTRLTOT 171 03/06/2017 07:23 AM     (H) 03/06/2017 07:23 AM    HDL 43 03/06/2017 07:23 AM    TRIGLYCERIDE 122 03/06/2017 07:23 AM       Lab Results   Component Value Date/Time    SODIUM 137 05/01/2017 11:33 AM    POTASSIUM 5.0 05/01/2017 11:33 AM    CHLORIDE 103 05/01/2017 11:33 AM    CO2 26 05/01/2017 11:33 AM    GLUCOSE 208 (H) 05/01/2017 11:33 AM    BUN 12 05/01/2017 11:33 AM    CREATININE 0.89 05/01/2017 11:33 AM     Lab Results   Component Value Date/Time    ALKPHOSPHAT 50 05/01/2017 11:33 AM    ASTSGOT 14 05/01/2017 11:33 AM    ALTSGPT 14 05/01/2017 11:33 AM    TBILIRUBIN 0.7 05/01/2017 11:33 AM        Dread Mojica, PharmD

## 2017-09-18 NOTE — TELEPHONE ENCOUNTER
Was the patient seen in the last year in this department? Yes     Does patient have an active prescription for medications requested? No     Received Request Via: Pharmacy      Pt met protocol?: Yes pt last ov 6/2017   Lab Results   Component Value Date/Time    HDL 43 03/06/2017 07:23 AM      Cholesterol,Tot   Date Value Ref Range Status   03/06/2017 171 100 - 199 mg/dL Final

## 2017-09-19 ENCOUNTER — OFFICE VISIT (OUTPATIENT)
Dept: MEDICAL GROUP | Facility: PHYSICIAN GROUP | Age: 62
End: 2017-09-19
Payer: OTHER MISCELLANEOUS

## 2017-09-19 ENCOUNTER — HOSPITAL ENCOUNTER (OUTPATIENT)
Dept: LAB | Facility: MEDICAL CENTER | Age: 62
End: 2017-09-19
Attending: FAMILY MEDICINE
Payer: OTHER MISCELLANEOUS

## 2017-09-19 VITALS
RESPIRATION RATE: 16 BRPM | HEIGHT: 65 IN | WEIGHT: 167 LBS | SYSTOLIC BLOOD PRESSURE: 120 MMHG | OXYGEN SATURATION: 95 % | HEART RATE: 92 BPM | BODY MASS INDEX: 27.82 KG/M2 | TEMPERATURE: 98.1 F | DIASTOLIC BLOOD PRESSURE: 66 MMHG

## 2017-09-19 DIAGNOSIS — R80.9 TYPE 2 DIABETES MELLITUS WITH ALBUMINURIA (HCC): ICD-10-CM

## 2017-09-19 DIAGNOSIS — E11.29 TYPE 2 DIABETES MELLITUS WITH ALBUMINURIA (HCC): ICD-10-CM

## 2017-09-19 LAB
EST. AVERAGE GLUCOSE BLD GHB EST-MCNC: 258 MG/DL
HBA1C MFR BLD: 10.6 % (ref 0–5.6)

## 2017-09-19 PROCEDURE — 99214 OFFICE O/P EST MOD 30 MIN: CPT | Performed by: FAMILY MEDICINE

## 2017-09-19 PROCEDURE — 83036 HEMOGLOBIN GLYCOSYLATED A1C: CPT

## 2017-09-19 PROCEDURE — 36415 COLL VENOUS BLD VENIPUNCTURE: CPT

## 2017-09-19 RX ORDER — GLIPIZIDE 10 MG/1
TABLET ORAL
Qty: 180 TAB | Refills: 4 | Status: SHIPPED | OUTPATIENT
Start: 2017-09-19 | End: 2018-08-27 | Stop reason: SDUPTHER

## 2017-09-19 RX ORDER — LISINOPRIL 2.5 MG/1
2.5 TABLET ORAL DAILY
Qty: 90 TAB | Refills: 4 | Status: SHIPPED | OUTPATIENT
Start: 2017-09-19 | End: 2018-08-27 | Stop reason: SDUPTHER

## 2017-09-19 RX ORDER — LOVASTATIN 40 MG/1
40 TABLET ORAL DAILY
Qty: 90 TAB | Refills: 1 | Status: SHIPPED | OUTPATIENT
Start: 2017-09-19 | End: 2018-08-27 | Stop reason: SDUPTHER

## 2017-09-19 NOTE — PROGRESS NOTES
Subjective:     Chief Complaint   Patient presents with   • Diabetes Mellitus       Jerome Mendoza is a 61 y.o. male here today for Follow-up on diabetes. At last visit A1c was noted to be 8.9. Patient was not taking Onglyza. Metformin, Actos, and glipizide were continued. Since last visit pt reports he stopped pioglitazone and onglyza due to low blood sugars. Pt reports readings in the 50-70s.  Pt reports unable to drive/work with low blood sugar. Since stopping medications pt has been taking blood sugars.  Pt reports fatigue if blood sugar is less than <80.  Pt reports hx of jittery and weak if blood sugar <120.    Patient has no new complaints. Patient believes that his A1c will now be well controlled. He has made no diet or lifestyle changes. He admits to an unhealthy diet with highly processed fast food. He continues to follow with ophthalmology for retinopathy of the right eye    Allergies   Allergen Reactions   • Penicillin G Swelling     Current medicines (including changes today)  Current Outpatient Prescriptions   Medication Sig Dispense Refill   • lovastatin (MEVACOR) 40 MG tablet Take 1 Tab by mouth every day. 90 Tab 1   • glipiZIDE (GLUCOTROL) 10 MG Tab TAKE ONE TABLET BY MOUTH TWICE DAILY 180 Tab 4   • lisinopril (PRINIVIL) 2.5 MG Tab Take 1 Tab by mouth every day. 90 Tab 4   • metformin (GLUCOPHAGE) 1000 MG tablet Take 1 Tab by mouth 2 times a day, with meals. 180 Tab 3   • Blood Glucose Monitoring Suppl SUPPLIES Misc Test strips order: Test strips for One Touch Ultra meter. Sig: use TID and prn ssx high or low sugar #100 RF x 3 100 Strip 3   • ibuprofen (MOTRIN) 600 MG Tab Take 1 Tab by mouth every 8 hours as needed. 20 Tab 0   • aspirin EC (ECOTRIN) 81 MG Tablet Delayed Response Take 1 Tab by mouth every day. 90 Tab 0   • SAXagliptin HCl (ONGLYZA) 5 MG Tab Take 1 Tab by mouth every day. 90 Tab 3   • mometasone (ASMANEX 60 METERED DOSES) 220 MCG/INH inhaler Inhale 2 Puffs by mouth every day. 3 Inhaler  "0   • Cholecalciferol (VITAMIN D3) 2000 UNIT Cap Take  by mouth.     • VENTOLIN  (90 BASE) MCG/ACT Aero Soln inhalation aerosol INHALE TWO PUFFS BY MOUTH EVERY 6 HOURS AS NEEDED FOR SHORTNESS OF BREATH 1 Inhaler 3   • pioglitazone (ACTOS) 45 MG Tab Take 1 Tab by mouth every day. 90 Tab 4     No current facility-administered medications for this visit.      Social History   Substance Use Topics   • Smoking status: Never Smoker   • Smokeless tobacco: Never Used      Comment: avoid all tobacco products   • Alcohol use No     Family Status   Relation Status   • Mother Alive   • Father    • Neg Hx      Family History   Problem Relation Age of Onset   • Asthma Mother    • Diabetes Father    • Psychiatry Father      dementia   • Heart Disease Neg Hx    • Stroke Neg Hx    • Cancer Neg Hx      He    has a past medical history of Asthma (2014); Hearing loss of both ears (2014); Hyperlipidemia (2014); Type II or unspecified type diabetes mellitus without mention of complication, not stated as uncontrolled (2014); and Wears hearing aid (2014).        ROS   GEN: no weight loss, fevers, or chills  HEENT: no change in vision, no ear pain, no difficulty swallowing, no throat pain, no runny nose, no nasal congestion  Resp: no shortness of breath, no cough  CV: no racing heart, no irregular beats, no chest pain  Abd: no nausea, no vomiting, no diarrhea, no constipation, no blood in stool, no dark stools, no incontinence  : no dysuria, no hematuria, no urinary incontinence, no increased frequency  MSK: no muscle aches, no joint pain, no limited motion  Neuro: no headaches, no dizziness, no LOC, no weakness, no numbness/tingling       Objective:     Blood pressure 120/66, pulse 92, temperature 36.7 °C (98.1 °F), resp. rate 16, height 1.66 m (5' 5.35\"), weight 75.8 kg (167 lb), SpO2 95 %. Body mass index is 27.49 kg/m².   Physical Exam:  Constitutional: Alert, no distress.  Skin: Warm, dry, good " turgor, no rashes in visible areas.  Eye: Equal, round and reactive, conjunctiva clear, lids normal.  ENMT: Lips without lesions, oropharynx non-erythematous, no exudate, moist oral mucosa  Neck: Trachea midline, no masses, no thyromegaly. No cervical or supraclavicular lymphadenopathy. Full ROM  Respiratory: Unlabored respiratory effort, good air movement, lungs clear to auscultation, no wheezes, no ronchi.  Cardiovascular:RRR, +S1, S2, no murmur, no peripheral edema, pedal and radial pulses equal and intact bilat  Abdomen: Soft, non-tender, no masses, no hepatosplenomegaly.  MSK:5/5 muscle strength in upper extremities as well as lower extremity bilaterally  Psych: Alert and oriented, appropriate affect and mood.  Neuro: CN2-12 intact, no gross motor or sensory deficits      Assessment and Plan:   The following treatment plan was discussed    1. 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 Patient is currently not taking 2 medications which were previously prescribed. Patient admits to a healthy lifestyle. A1c machine is not working there for in office A1c unavailable for review. Patient reports hypoglycemia. Relative hypoglycemia discussed with patient. If patient's A1c is not controlled will refer to endocrinology.   - HEMOGLOBIN A1C; Future  - lovastatin (MEVACOR) 40 MG tablet; Take 1 Tab by mouth every day.  Dispense: 90 Tab; Refill: 1  - glipiZIDE (GLUCOTROL) 10 MG Tab; TAKE ONE TABLET BY MOUTH TWICE DAILY  Dispense: 180 Tab; Refill: 4  - lisinopril (PRINIVIL) 2.5 MG Tab; Take 1 Tab by mouth every day.  Dispense: 90 Tab; Refill: 4  - metformin (GLUCOPHAGE) 1000 MG tablet; Take 1 Tab by mouth 2 times a day, with meals.  Dispense: 180 Tab; Refill: 3    This was a 25min face to face visit spend discussing disease process of diabetes and medication options.  Followup: Return in about 3 months (around 12/19/2017) for F/U DM, follow up pending results.    Please note that this  dictation was created using voice recognition software. I have made every reasonable attempt to correct obvious errors, but I expect that there are errors of grammar and possibly content that I did not discover before finalizing the note.

## 2017-09-20 ENCOUNTER — TELEPHONE (OUTPATIENT)
Dept: MEDICAL GROUP | Facility: PHYSICIAN GROUP | Age: 62
End: 2017-09-20

## 2017-09-20 NOTE — TELEPHONE ENCOUNTER
LVM advising Pt of message below. Advised him to give us a call back to let us know which option he would like to consider.

## 2017-09-20 NOTE — TELEPHONE ENCOUNTER
"Please call the pt and explain:   \"Your blood sugar is elevated. Your A1c is 10.6 which on average means your blood sugars are in the 250 range. With an A1c of greater than 10 insulin is usually needed to bring your blood sugars back within a normal range.    I know we discussed your low blood sugar levels as well and the concern for low blood sugars with the medications you were on. There are 2 options I recommend:   1. Follow-up with the diabetic specialist   or   2. Keep a  two-week log of food and blood sugars taken 4 times a day: fasting in the morning and  2 hours after each meal. Your log will help us understand your high and low blood sugars. With this log we can better treat your diabetes.   Please let me know which option he would like to consider. I can create the referral to the diabetic specialist\"  "

## 2017-09-21 NOTE — TELEPHONE ENCOUNTER
A1C does not need to be fasting.  Pt can repeat the test if he'd like, but insurance only covers the test every 3 months.  If pt keeps a blood sugar log, he will have the most accurate information.

## 2017-09-21 NOTE — TELEPHONE ENCOUNTER
Pt called today stating that our A1C test is not right and is incorrect because he was not fasting. He is in denial that his blood sugar is that high. He states that our numbers are wrong.  He is requesting that we re-do his A1C testing and send order to lab, I told him that I would ask but there is a possibility that insurance will not cover this test to be done again. He said that he understands and doesn't want to pay out of pocket but wants me to ask anyways. Please advise, thank you.

## 2017-10-02 ENCOUNTER — OFFICE VISIT (OUTPATIENT)
Dept: URGENT CARE | Facility: CLINIC | Age: 62
End: 2017-10-02

## 2017-10-02 VITALS
SYSTOLIC BLOOD PRESSURE: 140 MMHG | HEIGHT: 65 IN | BODY MASS INDEX: 27.99 KG/M2 | RESPIRATION RATE: 20 BRPM | HEART RATE: 88 BPM | TEMPERATURE: 98.4 F | WEIGHT: 168 LBS | OXYGEN SATURATION: 95 % | DIASTOLIC BLOOD PRESSURE: 80 MMHG

## 2017-10-02 DIAGNOSIS — Z02.4 DRIVER'S PERMIT PE (PHYSICAL EXAMINATION): ICD-10-CM

## 2017-10-02 PROCEDURE — 7100 PR DOT PHYSICAL: Performed by: NURSE PRACTITIONER

## 2017-10-02 ASSESSMENT — VISUAL ACUITY
OS_CC: 20/25
OD_CC: 20/40

## 2017-10-02 NOTE — PROGRESS NOTES
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 Physical exam is noted, patient's blood glucose random today is 260 mg/dL. Upon review of the patient's chart, I do see that his latest hemoglobin A1c is 10.6%. His primary care physician has attempted to contact the patient for follow-up on this. Patient states today that he does not believe his blood glucose is high, even though his fingerstick glucose here in the office corroborates that. I do not believe the patient is at immediate risk regarding driving, however this problem is uncontrolled and I discussed the patient that I will only re-certify him for 6 months in order for him to follow up with his primary physician. Patient verbalized understanding and agreement.

## 2017-12-13 ENCOUNTER — OFFICE VISIT (OUTPATIENT)
Dept: URGENT CARE | Facility: PHYSICIAN GROUP | Age: 62
End: 2017-12-13
Payer: OTHER MISCELLANEOUS

## 2017-12-13 ENCOUNTER — APPOINTMENT (OUTPATIENT)
Dept: RADIOLOGY | Facility: IMAGING CENTER | Age: 62
End: 2017-12-13
Attending: PHYSICIAN ASSISTANT
Payer: OTHER MISCELLANEOUS

## 2017-12-13 VITALS
TEMPERATURE: 98.3 F | DIASTOLIC BLOOD PRESSURE: 76 MMHG | WEIGHT: 168.4 LBS | SYSTOLIC BLOOD PRESSURE: 124 MMHG | HEIGHT: 63 IN | OXYGEN SATURATION: 95 % | HEART RATE: 81 BPM | BODY MASS INDEX: 29.84 KG/M2

## 2017-12-13 DIAGNOSIS — M25.551 RIGHT HIP PAIN: ICD-10-CM

## 2017-12-13 DIAGNOSIS — M54.41 ACUTE RIGHT-SIDED LOW BACK PAIN WITH RIGHT-SIDED SCIATICA: ICD-10-CM

## 2017-12-13 PROCEDURE — 72100 X-RAY EXAM L-S SPINE 2/3 VWS: CPT | Mod: TC | Performed by: PHYSICIAN ASSISTANT

## 2017-12-13 PROCEDURE — 99214 OFFICE O/P EST MOD 30 MIN: CPT | Performed by: PHYSICIAN ASSISTANT

## 2017-12-13 PROCEDURE — 73502 X-RAY EXAM HIP UNI 2-3 VIEWS: CPT | Mod: TC,RT | Performed by: PHYSICIAN ASSISTANT

## 2017-12-13 RX ORDER — TRAMADOL HYDROCHLORIDE 50 MG/1
TABLET ORAL
Qty: 20 TAB | Refills: 0 | Status: SHIPPED | OUTPATIENT
Start: 2017-12-13 | End: 2017-12-23

## 2017-12-13 RX ORDER — METHYLPREDNISOLONE 4 MG/1
TABLET ORAL
Qty: 21 TAB | Refills: 0 | Status: SHIPPED | OUTPATIENT
Start: 2017-12-13 | End: 2018-08-27

## 2017-12-13 RX ORDER — KETOROLAC TROMETHAMINE 30 MG/ML
60 INJECTION, SOLUTION INTRAMUSCULAR; INTRAVENOUS ONCE
Status: COMPLETED | OUTPATIENT
Start: 2017-12-13 | End: 2017-12-13

## 2017-12-13 RX ADMIN — KETOROLAC TROMETHAMINE 60 MG: 30 INJECTION, SOLUTION INTRAMUSCULAR; INTRAVENOUS at 11:11

## 2017-12-13 ASSESSMENT — PAIN SCALES - GENERAL: PAINLEVEL: 9=SEVERE PAIN

## 2017-12-13 ASSESSMENT — ENCOUNTER SYMPTOMS
HIP PAIN: 1
NEUROLOGICAL NEGATIVE: 1
BRUISES/BLEEDS EASILY: 0
GASTROINTESTINAL NEGATIVE: 1
CONSTITUTIONAL NEGATIVE: 1

## 2017-12-13 NOTE — PROGRESS NOTES
"Subjective:      Jerome Mendoza is a 62 y.o. male who presents with Hip Pain (Thigh Pain; x 8 months)        Hip Pain     Patient presents today for about 8 months of right \"hip\" pain of waxing and waning severity.  Patient states he has had in the last few weeks fairly consistent significant pain interfering with his sleep.  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  Pain is in buttocks area and sometimes feels it in groin, sometimes down his thigh. Does not get the pain in his left.  Intermittent tingling but no numbness, no changes in bladder or bowel function.   He has not really attempted interventions other than occasional over the counter analgesic.  He has not been worked up for this to any degree.  No injury to back, hip at anytime he can recall.     Review of Systems   Constitutional: Negative.    Gastrointestinal: Negative.    Genitourinary: Negative.    Musculoskeletal:        SEE HPI   Skin: Negative.    Neurological: Negative.    Endo/Heme/Allergies: Does not bruise/bleed easily.   All other systems reviewed and are negative.       PMH:  has a past medical history of Asthma (6/19/2014); Hearing loss of both ears (6/19/2014); Hyperlipidemia (6/19/2014); Type II or unspecified type diabetes mellitus without mention of complication, not stated as uncontrolled (6/19/2014); and Wears hearing aid (6/19/2014).  MEDS:   Current Outpatient Prescriptions:   •  tramadol (ULTRAM) 50 MG Tab, 1-2 tablets at bedtime prn severe pain.  No driving., Disp: 20 Tab, Rfl: 0  •  MethylPREDNISolone (MEDROL DOSEPAK) 4 MG Tablet Therapy Pack, As directed on the packaging label., Disp: 21 Tab, Rfl: 0  •  lovastatin (MEVACOR) 40 MG tablet, Take 1 Tab by mouth every day., Disp: 90 Tab, Rfl: 1  •  glipiZIDE (GLUCOTROL) 10 MG Tab, TAKE ONE TABLET BY MOUTH TWICE DAILY, Disp: 180 Tab, Rfl: 4  •  lisinopril (PRINIVIL) 2.5 MG Tab, Take 1 Tab by mouth every day., Disp: 90 Tab, Rfl: 4  •  metformin " "(GLUCOPHAGE) 1000 MG tablet, Take 1 Tab by mouth 2 times a day, with meals., Disp: 180 Tab, Rfl: 3  •  aspirin EC (ECOTRIN) 81 MG Tablet Delayed Response, Take 1 Tab by mouth every day., Disp: 90 Tab, Rfl: 0  •  SAXagliptin HCl (ONGLYZA) 5 MG Tab, Take 1 Tab by mouth every day., Disp: 90 Tab, Rfl: 3  •  mometasone (ASMANEX 60 METERED DOSES) 220 MCG/INH inhaler, Inhale 2 Puffs by mouth every day., Disp: 3 Inhaler, Rfl: 0  •  Blood Glucose Monitoring Suppl SUPPLIES Misc, Test strips order: Test strips for One Touch Ultra meter. Sig: use TID and prn ssx high or low sugar #100 RF x 3, Disp: 100 Strip, Rfl: 3  •  ibuprofen (MOTRIN) 600 MG Tab, Take 1 Tab by mouth every 8 hours as needed., Disp: 20 Tab, Rfl: 0  •  Cholecalciferol (VITAMIN D3) 2000 UNIT Cap, Take  by mouth., Disp: , Rfl:   •  VENTOLIN  (90 BASE) MCG/ACT Aero Soln inhalation aerosol, INHALE TWO PUFFS BY MOUTH EVERY 6 HOURS AS NEEDED FOR SHORTNESS OF BREATH, Disp: 1 Inhaler, Rfl: 3  •  pioglitazone (ACTOS) 45 MG Tab, Take 1 Tab by mouth every day., Disp: 90 Tab, Rfl: 4  ALLERGIES:   Allergies   Allergen Reactions   • Penicillin G Swelling     SURGHX:   Past Surgical History:   Procedure Laterality Date   • TONSILLECTOMY AND ADENOIDECTOMY     • VASECTOMY       SOCHX:  reports that he has never smoked. He has never used smokeless tobacco. He reports that he does not drink alcohol or use drugs.  FH: Family history was reviewed, no pertinent findings to report     Objective:     /76   Pulse 81   Temp 36.8 °C (98.3 °F)   Ht 1.6 m (5' 3\")   Wt 76.4 kg (168 lb 6.4 oz)   SpO2 95%   BMI 29.83 kg/m²      Physical Exam   Constitutional: He is oriented to person, place, and time. He appears well-developed and well-nourished. No distress.   Neck: Normal range of motion. Neck supple.   Cardiovascular: Normal rate and regular rhythm.    Pulmonary/Chest: Effort normal and breath sounds normal.   Musculoskeletal:        Back:    Neurological: He is alert " and oriented to person, place, and time.   Skin: Skin is warm and dry.   Psychiatric: He has a normal mood and affect. His behavior is normal.   Vitals reviewed.       RAD    Impression       Mild levoscoliosis of the lumbar spine.  Multilevel degenerative changes.   Reading Provider Reading Date   Jose Reilly M.D. Dec 13, 2017       Impression       No radiographic evidence of acute traumatic injury or significant degenerative change.   Reading Provider Reading Date   Richard Kemp M.D. Dec 13, 2017        Assessment/Plan:     1. Acute right-sided low back pain with right-sided sciatica  DX-LUMBAR SPINE-2 OR 3 VIEWS    ketorolac (TORADOL) injection 60 mg    tramadol (ULTRAM) 50 MG Tab    MethylPREDNISolone (MEDROL DOSEPAK) 4 MG Tablet Therapy Pack    REFERRAL TO PHYSIATRY (PMR)   2. Right hip pain  DX-HIP-COMPLETE - UNILATERAL 2+ RIGHT    ketorolac (TORADOL) injection 60 mg    REFERRAL TO PHYSIATRY (PMR)       -RAD findings as above.   -patient to follow up with Physiatry for further care.   -Toradol shot given in clinic, patient tolerated well.  Monitored for 10 mins s/p shot.   Please hydrate well today  -back care discussed, proper lifting mechanics discussed  -recommend heat/ice prn.  Gentle stretches daily.   -Tramadol if needed for bedtime.  No driving, caution drowsy.   -back pain red flags and ER precautions discussed with patient.       Supportive care, differential diagnoses, and indications for immediate follow-up discussed with patient.   Pathogenesis of diagnosis discussed including typical length and natural progression.   Instructed to return to clinic or nearest emergency department for any change in condition, further concerns, or worsening of symptoms.  Patient states understanding of the plan of care and discharge instructions.  Instructed to make an appointment, for follow up, with their primary care provider.      Harleen Dover P.A.-C.

## 2018-01-04 DIAGNOSIS — J45.20 MILD INTERMITTENT ASTHMA WITHOUT COMPLICATION: ICD-10-CM

## 2018-01-04 RX ORDER — ALBUTEROL SULFATE 90 UG/1
AEROSOL, METERED RESPIRATORY (INHALATION)
Qty: 1 INHALER | Refills: 3 | Status: SHIPPED | OUTPATIENT
Start: 2018-01-04 | End: 2018-06-18 | Stop reason: SDUPTHER

## 2018-01-05 RX ORDER — ALBUTEROL SULFATE 90 UG/1
AEROSOL, METERED RESPIRATORY (INHALATION)
Refills: 3 | OUTPATIENT
Start: 2018-01-05

## 2018-01-26 NOTE — TELEPHONE ENCOUNTER
Was the patient seen in the last year in this department? Yes     Does patient have an active prescription for medications requested? No     Received Request Via: Pharmacy      Pt met protocol?: Yes    OV 9/17

## 2018-03-07 ENCOUNTER — OFFICE VISIT (OUTPATIENT)
Dept: URGENT CARE | Facility: PHYSICIAN GROUP | Age: 63
End: 2018-03-07

## 2018-03-07 VITALS
SYSTOLIC BLOOD PRESSURE: 138 MMHG | HEART RATE: 85 BPM | RESPIRATION RATE: 10 BRPM | WEIGHT: 161 LBS | HEIGHT: 66 IN | DIASTOLIC BLOOD PRESSURE: 82 MMHG | TEMPERATURE: 98.3 F | BODY MASS INDEX: 25.88 KG/M2 | OXYGEN SATURATION: 98 %

## 2018-03-07 DIAGNOSIS — Z02.89 ENCOUNTER FOR EXAMINATION REQUIRED BY DEPARTMENT OF TRANSPORTATION (DOT): ICD-10-CM

## 2018-03-07 LAB
APPEARANCE UR: CLEAR
BILIRUB UR STRIP-MCNC: ABNORMAL MG/DL
COLOR UR AUTO: YELLOW
GLUCOSE UR STRIP.AUTO-MCNC: ABNORMAL MG/DL
HBA1C MFR BLD: 8.5 % (ref ?–5.8)
INT CON NEG: NEGATIVE
INT CON POS: POSITIVE
KETONES UR STRIP.AUTO-MCNC: ABNORMAL MG/DL
LEUKOCYTE ESTERASE UR QL STRIP.AUTO: ABNORMAL
NITRITE UR QL STRIP.AUTO: ABNORMAL
PH UR STRIP.AUTO: 5 [PH] (ref 5–8)
PROT UR QL STRIP: 30 MG/DL
RBC UR QL AUTO: ABNORMAL
SP GR UR STRIP.AUTO: 1.01
UROBILINOGEN UR STRIP-MCNC: ABNORMAL MG/DL

## 2018-03-07 PROCEDURE — 81002 URINALYSIS NONAUTO W/O SCOPE: CPT | Performed by: PHYSICIAN ASSISTANT

## 2018-03-07 PROCEDURE — 7100 PR DOT PHYSICAL: Performed by: PHYSICIAN ASSISTANT

## 2018-03-07 PROCEDURE — 83036 HEMOGLOBIN GLYCOSYLATED A1C: CPT | Performed by: PHYSICIAN ASSISTANT

## 2018-03-07 ASSESSMENT — VISUAL ACUITY
OD_CC: 20/30
OS_CC: 20/25

## 2018-03-07 NOTE — PROGRESS NOTES
DOT examination. Please see scan documentation for further detail. Patient does have a history of retinopathy and is being followed by ophthalmologist for this. Visual exam was within normal limits today. Patient also wears hearing aids bilaterally and was able to correctly responding to whisper test at 5 feet. Patient has been between providers with last diabetic evaluation in September. A1c today in clinic POCT 8.5. Given this, did clear patient for one year but advised establishing care immediately with a new primary care provider.

## 2018-06-18 DIAGNOSIS — J45.20 MILD INTERMITTENT ASTHMA WITHOUT COMPLICATION: ICD-10-CM

## 2018-06-18 NOTE — TELEPHONE ENCOUNTER
Was the patient seen in the last year in this department? Yes     Does patient have an active prescription for medications requested? No     Received Request Via: Pharmacy      Pt met protocol?: Yes, OV 3/18

## 2018-06-19 RX ORDER — ALBUTEROL SULFATE 90 UG/1
AEROSOL, METERED RESPIRATORY (INHALATION)
Qty: 1 INHALER | Refills: 0 | Status: SHIPPED | OUTPATIENT
Start: 2018-06-19 | End: 2018-08-27 | Stop reason: SDUPTHER

## 2018-07-25 ENCOUNTER — OFFICE VISIT (OUTPATIENT)
Dept: MEDICAL GROUP | Facility: PHYSICIAN GROUP | Age: 63
End: 2018-07-25
Payer: COMMERCIAL

## 2018-07-25 VITALS
BODY MASS INDEX: 25.82 KG/M2 | TEMPERATURE: 98.1 F | SYSTOLIC BLOOD PRESSURE: 110 MMHG | WEIGHT: 160 LBS | OXYGEN SATURATION: 95 % | DIASTOLIC BLOOD PRESSURE: 66 MMHG | HEART RATE: 88 BPM

## 2018-07-25 DIAGNOSIS — Z12.12 SCREENING FOR COLORECTAL CANCER: ICD-10-CM

## 2018-07-25 DIAGNOSIS — R80.9 TYPE 2 DIABETES MELLITUS WITH ALBUMINURIA (HCC): ICD-10-CM

## 2018-07-25 DIAGNOSIS — R21 RASH AND NONSPECIFIC SKIN ERUPTION: ICD-10-CM

## 2018-07-25 DIAGNOSIS — Z12.11 SCREENING FOR COLORECTAL CANCER: ICD-10-CM

## 2018-07-25 DIAGNOSIS — Z12.5 SCREENING FOR PROSTATE CANCER: ICD-10-CM

## 2018-07-25 DIAGNOSIS — E11.29 TYPE 2 DIABETES MELLITUS WITH ALBUMINURIA (HCC): ICD-10-CM

## 2018-07-25 PROCEDURE — 99214 OFFICE O/P EST MOD 30 MIN: CPT | Performed by: PHYSICIAN ASSISTANT

## 2018-07-25 RX ORDER — TRIAMCINOLONE ACETONIDE 1 MG/G
1 CREAM TOPICAL 2 TIMES DAILY PRN
Qty: 1 TUBE | Refills: 0 | Status: SHIPPED | OUTPATIENT
Start: 2018-07-25 | End: 2018-08-27

## 2018-07-25 RX ORDER — SAXAGLIPTIN 5 MG/1
1 TABLET, FILM COATED ORAL DAILY
Qty: 90 TAB | Refills: 3 | Status: SHIPPED | OUTPATIENT
Start: 2018-07-25 | End: 2018-08-15

## 2018-07-25 ASSESSMENT — PATIENT HEALTH QUESTIONNAIRE - PHQ9: CLINICAL INTERPRETATION OF PHQ2 SCORE: 0

## 2018-07-26 NOTE — PROGRESS NOTES
Subjective:   Jerome Mendoza is a 62 y.o. male here today for follow up on diabetes, skin rash. He is a former patient of Melissa Eli DO.    HPI:    Patient presents to the office today for follow-up on his diabetes. He admits that he has been noncompliant with his medication. His currently prescribed saxagliptin 5 mg daily, glipizide 10 mg twice daily, metformin 1000 mg twice a day, and Actos 45 mg daily. He is requesting refills of his saxagliptin today. He is not currently checking home blood sugar readings and states he has not done so in quite a long time. He mentions that he lost his job at the end of February and it took several weeks to get a new job, which is what he is attributing his noncompliance to. Last A1c was back in March and was elevated at that time at 8.5.    Patient states he would also like me to look at a rash on his right lower leg. He states that it is very itchy. Has been putting topical antibiotic ointment on the area without significant improvement.      Current medicines (including changes today)  Current Outpatient Prescriptions   Medication Sig Dispense Refill   • SAXagliptin HCl (ONGLYZA) 5 MG Tab Take 1 Tab by mouth every day. 90 Tab 3   • triamcinolone acetonide (KENALOG) 0.1 % Cream Apply 1 Application to affected area(s) 2 times a day as needed. 1 Tube 0   • albuterol (VENTOLIN HFA) 108 (90 Base) MCG/ACT Aero Soln inhalation aerosol INHALE TWO PUFFS BY MOUTH EVERY 6 HOURS AS NEEDED FOR SHORTNESS OF BREATH 1 Inhaler 0   • ASMANEX 60 METERED DOSES 220 MCG/INH inhaler INHALE TWO PUFFS BY MOUTH ONCE DAILY 3 Inhaler 1   • MethylPREDNISolone (MEDROL DOSEPAK) 4 MG Tablet Therapy Pack As directed on the packaging label. 21 Tab 0   • lovastatin (MEVACOR) 40 MG tablet Take 1 Tab by mouth every day. 90 Tab 1   • glipiZIDE (GLUCOTROL) 10 MG Tab TAKE ONE TABLET BY MOUTH TWICE DAILY 180 Tab 4   • lisinopril (PRINIVIL) 2.5 MG Tab Take 1 Tab by mouth every day. 90 Tab 4   • metformin (GLUCOPHAGE)  1000 MG tablet Take 1 Tab by mouth 2 times a day, with meals. 180 Tab 3   • aspirin EC (ECOTRIN) 81 MG Tablet Delayed Response Take 1 Tab by mouth every day. 90 Tab 0   • Blood Glucose Monitoring Suppl SUPPLIES Misc Test strips order: Test strips for One Touch Ultra meter. Sig: use TID and prn ssx high or low sugar #100 RF x 3 100 Strip 3   • ibuprofen (MOTRIN) 600 MG Tab Take 1 Tab by mouth every 8 hours as needed. 20 Tab 0   • Cholecalciferol (VITAMIN D3) 2000 UNIT Cap Take  by mouth.     • pioglitazone (ACTOS) 45 MG Tab Take 1 Tab by mouth every day. 90 Tab 4     No current facility-administered medications for this visit.      He  has a past medical history of Asthma (6/19/2014); Hearing loss of both ears (6/19/2014); Hyperlipidemia (6/19/2014); Type II or unspecified type diabetes mellitus without mention of complication, not stated as uncontrolled (6/19/2014); and Wears hearing aid (6/19/2014).    ROS  Cardiovascular ROS: No chest pain  Neurologic ROS: No foot burning, tingling, numbness       Objective:     Blood pressure 110/66, pulse 88, temperature 36.7 °C (98.1 °F), weight 72.6 kg (160 lb), SpO2 95 %. Body mass index is 25.82 kg/m².     Physical Exam:  Constitutional: Alert, well-appearing, no distress.  Skin: Warm, dry, good turgor. Several excoriated, scaly erythematous lesions visible on the right lower leg.  Eye: Conjunctiva clear, lids normal.  ENMT: Lips without lesions, moist mucus membranes.  Respiratory: Unlabored respiratory effort, lungs clear to auscultation, no wheezes, no rhonchi.  Cardiovascular: Normal S1, S2, no murmur, no lower extremity edema.      Assessment and Plan:   The following treatment plan was discussed    1. 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 He does not have any home blood sugar readings for review. At this  point, I have refilled his needed medication. He has an appointment to establish with new PCP next month. I'm recommending that he consistently check at least fasting blood sugars daily and bring the log to his appointment. We'll have him update all of his screening lab work before then as well.  - SAXagliptin HCl (ONGLYZA) 5 MG Tab; Take 1 Tab by mouth every day.  Dispense: 90 Tab; Refill: 3  - COMP METABOLIC PANEL; Future  - HEMOGLOBIN A1C; Future  - LIPID PROFILE; Future  - MICROALBUMIN CREAT RATIO URINE (LAB COLLECT); Future    2. Rash and nonspecific skin eruption  New problem. Rash appears eczematous in nature. We'll do trial of topical steroid. I have prescribed him triamcinolone cream. Should apply twice a day to the affected area as needed. Follow-up if worsening or no improvement.  - triamcinolone acetonide (KENALOG) 0.1 % Cream; Apply 1 Application to affected area(s) 2 times a day as needed.  Dispense: 1 Tube; Refill: 0    3. Screening for prostate cancer  - PROSTATE SPECIFIC AG SCREENING; Future    4. Screening for colorectal cancer  - OCCULT BLOOD FECES IMMUNOASSAY (FIT); Future      Followup: Return for establish care; Short.    Lois Pulliam P.A.-C.

## 2018-08-13 ENCOUNTER — TELEPHONE (OUTPATIENT)
Dept: MEDICAL GROUP | Facility: PHYSICIAN GROUP | Age: 63
End: 2018-08-13

## 2018-08-13 DIAGNOSIS — R80.9 TYPE 2 DIABETES MELLITUS WITH ALBUMINURIA (HCC): ICD-10-CM

## 2018-08-13 DIAGNOSIS — E11.29 TYPE 2 DIABETES MELLITUS WITH ALBUMINURIA (HCC): ICD-10-CM

## 2018-08-13 NOTE — TELEPHONE ENCOUNTER
MEDICATION PRIOR AUTHORIZATION NEEDED:    1. Name of Medication: Onglyza 5mg tab    2. Requested By (Name of Pharmacy): [facility]      3. Is insurance on file current? yes    4. What is the name & phone number of the 3rd party payor? [employer]

## 2018-08-15 NOTE — TELEPHONE ENCOUNTER
FINAL PRIOR AUTHORIZATION STATUS:    1.  Name of Medication & Dose: Onglyza     2. Prior Auth Status: Denied.  Reason: Pt needs to try Januvia and Trajenta befoe this medication can be approved. See scanned document.    3. Action Taken: Pharmacy Notified: yes Patient Notified: yes

## 2018-08-15 NOTE — TELEPHONE ENCOUNTER
I have prescribed Januvia as alternative to the Onglyza. Should take daily.  Lois Pulliam P.A.-C.

## 2018-08-15 NOTE — TELEPHONE ENCOUNTER
Lois COLBY was denied. Can you send an alternate medication. PA response says Pt needs to try Januvia or Trajenta. Please advise.

## 2018-08-20 ENCOUNTER — HOSPITAL ENCOUNTER (OUTPATIENT)
Dept: LAB | Facility: MEDICAL CENTER | Age: 63
End: 2018-08-20
Attending: PHYSICIAN ASSISTANT
Payer: COMMERCIAL

## 2018-08-20 DIAGNOSIS — E11.29 TYPE 2 DIABETES MELLITUS WITH ALBUMINURIA (HCC): ICD-10-CM

## 2018-08-20 DIAGNOSIS — Z12.5 SCREENING FOR PROSTATE CANCER: ICD-10-CM

## 2018-08-20 DIAGNOSIS — R80.9 TYPE 2 DIABETES MELLITUS WITH ALBUMINURIA (HCC): ICD-10-CM

## 2018-08-20 LAB
ALBUMIN SERPL BCP-MCNC: 3.9 G/DL (ref 3.2–4.9)
ALBUMIN/GLOB SERPL: 1.3 G/DL
ALP SERPL-CCNC: 49 U/L (ref 30–99)
ALT SERPL-CCNC: 16 U/L (ref 2–50)
ANION GAP SERPL CALC-SCNC: 10 MMOL/L (ref 0–11.9)
AST SERPL-CCNC: 13 U/L (ref 12–45)
BILIRUB SERPL-MCNC: 0.7 MG/DL (ref 0.1–1.5)
BUN SERPL-MCNC: 17 MG/DL (ref 8–22)
CALCIUM SERPL-MCNC: 9.3 MG/DL (ref 8.5–10.5)
CHLORIDE SERPL-SCNC: 102 MMOL/L (ref 96–112)
CHOLEST SERPL-MCNC: 191 MG/DL (ref 100–199)
CO2 SERPL-SCNC: 24 MMOL/L (ref 20–33)
CREAT SERPL-MCNC: 0.92 MG/DL (ref 0.5–1.4)
CREAT UR-MCNC: 73.6 MG/DL
EST. AVERAGE GLUCOSE BLD GHB EST-MCNC: 301 MG/DL
GLOBULIN SER CALC-MCNC: 2.9 G/DL (ref 1.9–3.5)
GLUCOSE SERPL-MCNC: 321 MG/DL (ref 65–99)
HBA1C MFR BLD: 12.1 % (ref 0–5.6)
HDLC SERPL-MCNC: 33 MG/DL
LDLC SERPL CALC-MCNC: 141 MG/DL
MICROALBUMIN UR-MCNC: 10.3 MG/DL
MICROALBUMIN/CREAT UR: 140 MG/G (ref 0–30)
POTASSIUM SERPL-SCNC: 4.5 MMOL/L (ref 3.6–5.5)
PROT SERPL-MCNC: 6.8 G/DL (ref 6–8.2)
PSA SERPL-MCNC: 1.1 NG/ML (ref 0–4)
SODIUM SERPL-SCNC: 136 MMOL/L (ref 135–145)
TRIGL SERPL-MCNC: 83 MG/DL (ref 0–149)

## 2018-08-20 PROCEDURE — 82570 ASSAY OF URINE CREATININE: CPT

## 2018-08-20 PROCEDURE — 80061 LIPID PANEL: CPT

## 2018-08-20 PROCEDURE — 83036 HEMOGLOBIN GLYCOSYLATED A1C: CPT

## 2018-08-20 PROCEDURE — 82043 UR ALBUMIN QUANTITATIVE: CPT

## 2018-08-20 PROCEDURE — 84153 ASSAY OF PSA TOTAL: CPT

## 2018-08-20 PROCEDURE — 80053 COMPREHEN METABOLIC PANEL: CPT

## 2018-08-20 PROCEDURE — 36415 COLL VENOUS BLD VENIPUNCTURE: CPT

## 2018-08-22 ENCOUNTER — HOSPITAL ENCOUNTER (OUTPATIENT)
Facility: MEDICAL CENTER | Age: 63
End: 2018-08-22
Attending: PHYSICIAN ASSISTANT
Payer: COMMERCIAL

## 2018-08-22 PROCEDURE — 82274 ASSAY TEST FOR BLOOD FECAL: CPT

## 2018-08-24 ENCOUNTER — TELEPHONE (OUTPATIENT)
Dept: MEDICAL GROUP | Facility: PHYSICIAN GROUP | Age: 63
End: 2018-08-24

## 2018-08-24 NOTE — TELEPHONE ENCOUNTER
Future Appointments       Provider Department Center    8/27/2018 7:55 AM Priscilla Henriquez M.D. [facility]        ESTABLISHED PATIENT PRE-VISIT PLANNING     Note: Patient will not be contacted if there is no indication to call.     1.  Reviewed notes from the last few office visits within the medical group: Yes    2.  If any orders were placed at last visit or intended to be done for this visit (i.e. 6 mos follow-up), do we have Results/Consult Notes?        •  Labs - Labs ordered, completed on 08/20/18 and results are in chart.       •  Imaging - Imaging was not ordered at last office visit.       •  Referrals - No referrals were ordered at last office visit.    3. Is this appointment scheduled as a Hospital Follow-Up? No    4.  Immunizations were updated in Patient Engagement Systems using WebIZ?: Yes       •  Web Iz Recommendations: FLU, TDAP and ZOSTAVAX (Shingles)    5.  Patient is due for the following Health Maintenance Topics:   Health Maintenance Due   Topic Date Due   • IMM HEP B VACCINE (1 of 3 - Risk 3-dose series) 10/26/1972   • IMM DTaP/Tdap/Td Vaccine (1 - Tdap) 10/26/1972   • COLON CANCER SCREENING ANNUAL FIT  10/26/2003   • IMM ZOSTER VACCINES (1 of 2) 10/26/2003   • RETINAL SCREENING  08/02/2017   • DIABETES MONOFILAMENT / LE EXAM  10/20/2017   • IMM INFLUENZA (1) 09/01/2018       6.  MDX printed for Provider? NO INS BCBS     7.  Patient was informed to arrive 15 min prior to their scheduled appointment and bring in their medication bottles.

## 2018-08-27 ENCOUNTER — OFFICE VISIT (OUTPATIENT)
Dept: MEDICAL GROUP | Facility: PHYSICIAN GROUP | Age: 63
End: 2018-08-27
Payer: COMMERCIAL

## 2018-08-27 VITALS
HEIGHT: 66 IN | TEMPERATURE: 98.1 F | HEART RATE: 80 BPM | SYSTOLIC BLOOD PRESSURE: 130 MMHG | RESPIRATION RATE: 18 BRPM | OXYGEN SATURATION: 97 % | DIASTOLIC BLOOD PRESSURE: 75 MMHG | BODY MASS INDEX: 25.71 KG/M2 | WEIGHT: 160 LBS

## 2018-08-27 DIAGNOSIS — R80.9 TYPE 2 DIABETES MELLITUS WITH ALBUMINURIA (HCC): ICD-10-CM

## 2018-08-27 DIAGNOSIS — J45.20 MILD INTERMITTENT ASTHMA WITHOUT COMPLICATION: ICD-10-CM

## 2018-08-27 DIAGNOSIS — E11.29 TYPE 2 DIABETES MELLITUS WITH ALBUMINURIA (HCC): ICD-10-CM

## 2018-08-27 PROCEDURE — 99214 OFFICE O/P EST MOD 30 MIN: CPT | Performed by: FAMILY MEDICINE

## 2018-08-27 RX ORDER — LOVASTATIN 40 MG/1
40 TABLET ORAL DAILY
Qty: 90 TAB | Refills: 1 | Status: SHIPPED | OUTPATIENT
Start: 2018-08-27 | End: 2018-11-05

## 2018-08-27 RX ORDER — GLIPIZIDE 10 MG/1
TABLET ORAL
Qty: 180 TAB | Refills: 4 | Status: SHIPPED | OUTPATIENT
Start: 2018-08-27 | End: 2018-11-05 | Stop reason: SDUPTHER

## 2018-08-27 RX ORDER — LISINOPRIL 2.5 MG/1
2.5 TABLET ORAL DAILY
Qty: 90 TAB | Refills: 4 | Status: SHIPPED | OUTPATIENT
Start: 2018-08-27 | End: 2018-11-05 | Stop reason: SDUPTHER

## 2018-08-27 RX ORDER — ALBUTEROL SULFATE 90 UG/1
AEROSOL, METERED RESPIRATORY (INHALATION)
Qty: 1 INHALER | Refills: 2 | Status: SHIPPED | OUTPATIENT
Start: 2018-08-27 | End: 2018-12-11 | Stop reason: SDUPTHER

## 2018-08-27 NOTE — PROGRESS NOTES
cc: Diabetes Type II      Subjective:     Jerome Mendoza is a 62 y.o. male presenting for the following:     Patient was previously very well controlled with his diabetes but has had a very difficult year.  Earlier in the year he lost his job and his health insurance and was evicted from his home.  He has now found a new job and again has insurance.  In addition to running out of medications he also was very stressed out and was not watching his diet.  His hemoglobin A1c is now 12.1, it had previously never been higher than 8.5.    He is very concerned about this and he feels ready to get back into a regular regimen.  He was previously controlled with metformin and glipizide.  When his hemoglobin A1c started to go up Onglyza was added.  This was switched to Januvia per pharmacy/insurance preference.  But that medication had run out prior to him losing his job, so he has not been taking it.  Also he has not been taking the statin.    He was previously followed by [facility] for retinopathy in the right eye.  He does plan to return to them.    Overall he does feel well today.  No polyuria/polydipsia, shortness of breath, chest pain, fatigue.  No changes in weight or appetite.  He is feeling much more positive about the future now that he has a job but it is still stressful to him.    Review of systems:  See above.       Current Outpatient Prescriptions:   •  albuterol (VENTOLIN HFA) 108 (90 Base) MCG/ACT Aero Soln inhalation aerosol, INHALE TWO PUFFS BY MOUTH EVERY 6 HOURS AS NEEDED FOR SHORTNESS OF BREATH, Disp: 1 Inhaler, Rfl: 2  •  mometasone (ASMANEX 60 METERED DOSES) 220 MCG/INH inhaler, INHALE TWO PUFFS BY MOUTH ONCE DAILY, Disp: 3 Inhaler, Rfl: 3  •  glipiZIDE (GLUCOTROL) 10 MG Tab, TAKE ONE TABLET BY MOUTH TWICE DAILY, Disp: 180 Tab, Rfl: 4  •  lisinopril (PRINIVIL) 2.5 MG Tab, Take 1 Tab by mouth every day., Disp: 90 Tab, Rfl: 4  •  lovastatin (MEVACOR) 40 MG tablet, Take 1 Tab by mouth every  "day., Disp: 90 Tab, Rfl: 1  •  metformin (GLUCOPHAGE) 1000 MG tablet, Take 1 Tab by mouth 2 times a day, with meals., Disp: 180 Tab, Rfl: 3  •  SITagliptin (JANUVIA) 100 MG Tab, Take 1 Tab by mouth every day., Disp: 30 Tab, Rfl: 3  •  aspirin EC (ECOTRIN) 81 MG Tablet Delayed Response, Take 1 Tab by mouth every day., Disp: 90 Tab, Rfl: 0  •  Blood Glucose Monitoring Suppl SUPPLIES Misc, Test strips order: Test strips for One Touch Ultra meter. Sig: use TID and prn ssx high or low sugar #100 RF x 3, Disp: 100 Strip, Rfl: 3  •  Cholecalciferol (VITAMIN D3) 2000 UNIT Cap, Take  by mouth., Disp: , Rfl:     Allergies, past medical history, past surgical history, family history, social history reviewed and updated    Objective:     Vitals: /75   Pulse 80   Temp 36.7 °C (98.1 °F)   Resp 18   Ht 1.676 m (5' 6\")   Wt 72.6 kg (160 lb)   SpO2 97%   BMI 25.82 kg/m²   General: Alert, pleasant, NAD  HEENT: Normocephalic.   EOMI, no icterus or pallor.  Conjunctivae and lids normal. External ears normal. Oropharynx non-erythematous, mucous membranes moist.    Neck supple.  No thyromegaly or masses palpated. No cervical or supraclavicular lymphadenopathy.  Heart: Regular rate and rhythm.  S1 and S2 normal.  No murmurs appreciated.  Respiratory: Normal respiratory effort.  Clear to auscultation bilaterally.  Abdomen: Non-distended, soft  Skin: Warm, dry, no rashes.  Musculoskeletal: Gait is normal.  Moves all extremities well.  Extremities: No leg edema.    Neurological: No tremors, sensation grossly intact,  tone/strength normal, gait is normal, CN2-12 grossly intact  Psych:  Affect is normal, judgement is good, memory is intact, grooming is appropriate.    Assessment/Plan:     Jerome was seen today for annual exam.    Diagnoses and all orders for this visit:    Mild intermittent asthma without complication-patient without shortness of breath or wheeze now but has been bad with the smoke in the past.  Will send refills " of his asthma medications.  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  Will restart this medication today.  Patient does not like checking his blood sugars at home, so will get a fasting blood sugar level in the lab in about 4 weeks.  Patient is very motivated to again control his hemoglobin A1c and does hope to also make dietary changes.  Patient told today that he does have some microalbuminuria with a normal GFR.  Understands that the best way to avoid renal damage is to control blood pressure and blood glucose.  -     glipiZIDE (GLUCOTROL) 10 MG Tab; TAKE ONE TABLET BY MOUTH TWICE DAILY  -     lisinopril (PRINIVIL) 2.5 MG Tab; Take 1 Tab by mouth every day.  -     lovastatin (MEVACOR) 40 MG tablet; Take 1 Tab by mouth every day.  -     metformin (GLUCOPHAGE) 1000 MG tablet; Take 1 Tab by mouth 2 times a day, with meals.  -     SITagliptin (JANUVIA) 100 MG Tab; Take 1 Tab by mouth every day.  -     BLOOD GLUCOSE; Future        -Patient did send in a fit test.  Will await results of this.  -Patient to follow-up with [facility]  Will request records from them today.    Return in about 3 months (around 11/27/2018).

## 2018-08-27 NOTE — LETTER
[facility]  Priscilla Henriquez M.D.  [address]  [place] [address]  Fax: [phone]   Authorization for Release/Disclosure of   Protected Health Information   Name: ASUNCION MENDOZA : 1955 SSN: [id number]   Address: [address] [place] [address] [place] [address] Phone:    [phone] (home)    I authorize the entity listed below to release/disclose the PHI below to:   [facility]/Priscilla Henriquez M.D. and Priscilla Henriquez M.D.   Provider or Entity Name:  [facility]   Address   City, State, [facility]   Phone:      Fax:     Reason for request: continuity of care   Information to be released:    [  ] LAST COLONOSCOPY,  including any PATH REPORT and follow-up  [  ] LAST FIT/COLOGUARD RESULT [  ] LAST DEXA  [  ] LAST MAMMOGRAM  [  ] LAST PAP  [  ] LAST LABS [  ] RETINA EXAM REPORT  [  ] IMMUNIZATION RECORDS  [XX] Release all info      [  ] Check here and initial the line next to each item to release ALL health information INCLUDING  _____ Care and treatment for drug and / or alcohol abuse  _____ HIV testing, infection status, or AIDS  _____ Genetic Testing    DATES OF SERVICE OR TIME PERIOD TO BE DISCLOSED: _____________  I understand and acknowledge that:  * This Authorization may be revoked at any time by you in writing, except if your health information has already been used or disclosed.  * Your health information that will be used or disclosed as a result of you signing this authorization could be re-disclosed by the recipient. If this occurs, your re-disclosed health information may no longer be protected by State or Federal laws.  * You may refuse to sign this Authorization. Your refusal will not affect your ability to obtain treatment.  * This Authorization becomes effective upon signing and will  on (date) __________.      If no date is indicated, this Authorization will  one (1) year from the signature date.    Name: Asuncion Mendoza    Signature:   Date:     2018            PLEASE FAX REQUESTED RECORDS BACK TO: [phone]

## 2018-08-28 DIAGNOSIS — Z12.11 SCREENING FOR COLORECTAL CANCER: ICD-10-CM

## 2018-08-28 DIAGNOSIS — Z12.12 SCREENING FOR COLORECTAL CANCER: ICD-10-CM

## 2018-08-29 LAB — HEMOCCULT STL QL IA: NEGATIVE

## 2018-10-01 ENCOUNTER — HOSPITAL ENCOUNTER (OUTPATIENT)
Dept: LAB | Facility: MEDICAL CENTER | Age: 63
End: 2018-10-01
Attending: FAMILY MEDICINE
Payer: COMMERCIAL

## 2018-10-01 DIAGNOSIS — E11.29 TYPE 2 DIABETES MELLITUS WITH ALBUMINURIA (HCC): ICD-10-CM

## 2018-10-01 DIAGNOSIS — R80.9 TYPE 2 DIABETES MELLITUS WITH ALBUMINURIA (HCC): ICD-10-CM

## 2018-10-01 LAB
FASTING STATUS PATIENT QL REPORTED: NORMAL
GLUCOSE SERPL-MCNC: 217 MG/DL (ref 65–99)

## 2018-10-01 PROCEDURE — 36415 COLL VENOUS BLD VENIPUNCTURE: CPT

## 2018-10-01 PROCEDURE — 82947 ASSAY GLUCOSE BLOOD QUANT: CPT

## 2018-10-05 ENCOUNTER — TELEPHONE (OUTPATIENT)
Dept: MEDICAL GROUP | Facility: PHYSICIAN GROUP | Age: 63
End: 2018-10-05

## 2018-10-06 NOTE — TELEPHONE ENCOUNTER
----- Message from Priscilla Henriquez M.D. sent at 10/5/2018 10:37 AM PDT -----  Results released via [employer]. Will schedule patient with diabetes nurse to improve control.

## 2018-10-08 ENCOUNTER — TELEPHONE (OUTPATIENT)
Dept: MEDICAL GROUP | Facility: PHYSICIAN GROUP | Age: 63
End: 2018-10-08

## 2018-10-08 NOTE — TELEPHONE ENCOUNTER
----- Message from Priscilla Henriquez M.D. sent at 10/5/2018 10:37 AM PDT -----  Results released via [employer]. Will schedule patient with diabetes nurse to improve control.

## 2018-11-02 ENCOUNTER — TELEPHONE (OUTPATIENT)
Dept: MEDICAL GROUP | Facility: PHYSICIAN GROUP | Age: 63
End: 2018-11-02

## 2018-11-02 NOTE — TELEPHONE ENCOUNTER
Future Appointments       Provider Department Center    11/5/2018 8:35 AM Priscilla Henriquez M.D. [facility]        ESTABLISHED PATIENT PRE-VISIT PLANNING     Note: Patient will not be contacted if there is no indication to call.     1.  Reviewed notes from the last few office visits within the medical group: Yes    2.  If any orders were placed at last visit or intended to be done for this visit (i.e. 6 mos follow-up), do we have Results/Consult Notes?        •  Labs - Labs ordered, completed on 08/28/18 and results are in chart.       •  Imaging - Imaging was not ordered at last office visit.       •  Referrals - No referrals were ordered at last office visit.    3. Is this appointment scheduled as a Hospital Follow-Up? No    4.  Immunizations were updated in [employer] using WebIZ?: Yes       •  Web [employer] Recommendations: FLU, PREVNAR (PCV13) , TDAP and ZOSTAVAX (Shingles)    5.  Patient is due for the following Health Maintenance Topics:   Health Maintenance Due   Topic Date Due   • IMM HEP B VACCINE (1 of 3 - Risk 3-dose series) 10/26/1972   • IMM ZOSTER VACCINES (1 of 2) 10/26/2003   • IMM DTaP/Tdap/Td Vaccine (1 - Tdap) 01/21/2004   • DIABETES MONOFILAMENT / LE EXAM  10/20/2017   • IMM INFLUENZA (1) 09/01/2018   • IMM PNEUMOCOCCAL(1 of 2 - PCV13) 10/26/2018       6.  MDX printed for Provider? NO    7.  Patient was informed to arrive 15 min prior to their scheduled appointment and bring in their medication bottles. Confirmed through automated call

## 2018-11-05 ENCOUNTER — OFFICE VISIT (OUTPATIENT)
Dept: MEDICAL GROUP | Facility: PHYSICIAN GROUP | Age: 63
End: 2018-11-05
Payer: COMMERCIAL

## 2018-11-05 VITALS
HEIGHT: 66 IN | SYSTOLIC BLOOD PRESSURE: 150 MMHG | WEIGHT: 162 LBS | HEART RATE: 80 BPM | BODY MASS INDEX: 26.03 KG/M2 | TEMPERATURE: 97.8 F | OXYGEN SATURATION: 97 % | RESPIRATION RATE: 18 BRPM | DIASTOLIC BLOOD PRESSURE: 80 MMHG

## 2018-11-05 DIAGNOSIS — J45.20 MILD INTERMITTENT ASTHMA WITHOUT COMPLICATION: ICD-10-CM

## 2018-11-05 DIAGNOSIS — Z79.899 ON STATIN THERAPY: ICD-10-CM

## 2018-11-05 DIAGNOSIS — E11.29 TYPE 2 DIABETES MELLITUS WITH ALBUMINURIA (HCC): ICD-10-CM

## 2018-11-05 DIAGNOSIS — R80.9 TYPE 2 DIABETES MELLITUS WITH ALBUMINURIA (HCC): ICD-10-CM

## 2018-11-05 DIAGNOSIS — E78.2 MIXED HYPERLIPIDEMIA: ICD-10-CM

## 2018-11-05 PROCEDURE — 99214 OFFICE O/P EST MOD 30 MIN: CPT | Performed by: FAMILY MEDICINE

## 2018-11-05 RX ORDER — TRIAMCINOLONE ACETONIDE 1 MG/G
1 CREAM TOPICAL 2 TIMES DAILY
Qty: 1 TUBE | Refills: 0 | Status: SHIPPED | OUTPATIENT
Start: 2018-11-05 | End: 2018-11-15

## 2018-11-05 RX ORDER — GLIPIZIDE 10 MG/1
TABLET ORAL
Qty: 180 TAB | Refills: 4 | Status: SHIPPED | OUTPATIENT
Start: 2018-11-05 | End: 2018-12-11 | Stop reason: SDUPTHER

## 2018-11-05 RX ORDER — ATORVASTATIN CALCIUM 10 MG/1
10 TABLET, FILM COATED ORAL DAILY
Qty: 90 TAB | Refills: 2 | Status: SHIPPED | OUTPATIENT
Start: 2018-11-05 | End: 2018-12-11 | Stop reason: SDUPTHER

## 2018-11-05 RX ORDER — LISINOPRIL 2.5 MG/1
2.5 TABLET ORAL DAILY
Qty: 90 TAB | Refills: 4 | Status: SHIPPED | OUTPATIENT
Start: 2018-11-05 | End: 2018-12-11 | Stop reason: SDUPTHER

## 2018-11-05 NOTE — PROGRESS NOTES
cc: diabetes      Subjective:     Jerome Mendoza is a 63 y.o. male presenting for the following:     Diabetes: Patient was restarted on his diabetes medication on his last appointment. His HA1C was 12.1, but he was not taking his medications in the three months prior or watching his diet. He is now taking metformin, januvia, and glipizide daily. No side effects. No symptoms of hypoglycemia. He is also cutting down on carbohydrate. He mostly feels improved and energy is better. He is trying to lift some weights and he has lost 5 Ibs.     Hyperlipidemia: Patient was restarted on statin on last appointment. He was previously taking this at 40mg but this time he developed nausea and some mild muscle ache so he stopped taking. No juandice or abd pain.       Review of systems:  All others reviewed and are negative.       Current Outpatient Prescriptions:   •  triamcinolone acetonide (KENALOG) 0.1 % Cream, Apply 1 Application to affected area(s) 2 times a day for 10 days., Disp: 1 Tube, Rfl: 0  •  lisinopril (PRINIVIL) 2.5 MG Tab, Take 1 Tab by mouth every day., Disp: 90 Tab, Rfl: 4  •  glipiZIDE (GLUCOTROL) 10 MG Tab, TAKE ONE TABLET BY MOUTH TWICE DAILY, Disp: 180 Tab, Rfl: 4  •  metformin (GLUCOPHAGE) 1000 MG tablet, Take 1 Tab by mouth 2 times a day, with meals., Disp: 180 Tab, Rfl: 3  •  SITagliptin (JANUVIA) 100 MG Tab, Take 1 Tab by mouth every day., Disp: 90 Tab, Rfl: 3  •  atorvastatin (LIPITOR) 10 MG Tab, Take 1 Tab by mouth every day., Disp: 90 Tab, Rfl: 2  •  mometasone (ASMANEX 60 METERED DOSES) 220 MCG/INH inhaler, INHALE TWO PUFFS BY MOUTH ONCE DAILY, Disp: 3 Inhaler, Rfl: 3  •  albuterol (VENTOLIN HFA) 108 (90 Base) MCG/ACT Aero Soln inhalation aerosol, INHALE TWO PUFFS BY MOUTH EVERY 6 HOURS AS NEEDED FOR SHORTNESS OF BREATH, Disp: 1 Inhaler, Rfl: 2  •  aspirin EC (ECOTRIN) 81 MG Tablet Delayed Response, Take 1 Tab by mouth every day., Disp: 90 Tab, Rfl: 0  •  Blood Glucose Monitoring Suppl SUPPLIES  "Misc, Test strips order: Test strips for One Touch Ultra meter. Sig: use TID and prn ssx high or low sugar #100 RF x 3, Disp: 100 Strip, Rfl: 3  •  Cholecalciferol (VITAMIN D3) 2000 UNIT Cap, Take  by mouth., Disp: , Rfl:     Allergies, past medical history, past surgical history, family history, social history reviewed and updated    Objective:     Vitals: /80 (BP Location: Right arm, Patient Position: Sitting, BP Cuff Size: Adult)   Pulse 80   Temp 36.6 °C (97.8 °F) (Temporal)   Resp 18   Ht 1.676 m (5' 6\")   Wt 73.5 kg (162 lb)   SpO2 97%   BMI 26.15 kg/m²   General: Alert, pleasant, NAD  Heart: Regular rate and rhythm.  S1 and S2 normal.  No murmurs appreciated.  Extremities: No leg edema.    Psych:  Affect is normal, judgement is good, memory is intact, grooming is appropriate.    Assessment/Plan:     Jerome was seen today for diabetes.    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 He is feeling well on his medications again. Fasting BG still elevated on last check, so may still need increase in therapy.   -     lisinopril (PRINIVIL) 2.5 MG Tab; Take 1 Tab by mouth every day.  -     glipiZIDE (GLUCOTROL) 10 MG Tab; TAKE ONE TABLET BY MOUTH TWICE DAILY  -     metformin (GLUCOPHAGE) 1000 MG tablet; Take 1 Tab by mouth 2 times a day, with meals.  -     SITagliptin (JANUVIA) 100 MG Tab; Take 1 Tab by mouth every day.  -     HEMOGLOBIN A1C; Future  -     COMP METABOLIC PANEL; Future    Mixed hyperlipidemia: patient with nasuea and mild muscle ache on lovastatin 40mg. Will change to atorvastatin 10mg and increase dose slowly as tolerated. Will check CMP for liver enzymes.   -     atorvastatin (LIPITOR) 10 MG Tab; Take 1 Tab by mouth every day.  On statin therapy  -     COMP METABOLIC PANEL; Future    Mild intermittent asthma without complication: well controlled. Wants " refills.   -     mometasone (ASMANEX 60 METERED DOSES) 220 MCG/INH inhaler; INHALE TWO PUFFS BY MOUTH ONCE DAILY      Other orders: occasional itchy rash on anterior right shin. Never cellulitis or infection. Treated well with triamcinolone in the past. Not bad today but comes and goes.   -     triamcinolone acetonide (KENALOG) 0.1 % Cream; Apply 1 Application to affected area(s) 2 times a day for 10 days.      Patient is interested in checking his testosterone levels. But he has improved energy and weight loss and feeling of  with exercise, so not symptomatic. So he will check with his insurance prior to me ordering it.     Return in about 4 weeks (around 12/3/2018).

## 2018-11-26 ENCOUNTER — HOSPITAL ENCOUNTER (OUTPATIENT)
Dept: LAB | Facility: MEDICAL CENTER | Age: 63
End: 2018-11-26
Attending: FAMILY MEDICINE
Payer: COMMERCIAL

## 2018-11-26 DIAGNOSIS — R80.9 TYPE 2 DIABETES MELLITUS WITH ALBUMINURIA (HCC): ICD-10-CM

## 2018-11-26 DIAGNOSIS — E11.29 TYPE 2 DIABETES MELLITUS WITH ALBUMINURIA (HCC): ICD-10-CM

## 2018-11-26 DIAGNOSIS — Z79.899 ON STATIN THERAPY: ICD-10-CM

## 2018-11-26 LAB
EST. AVERAGE GLUCOSE BLD GHB EST-MCNC: 229 MG/DL
HBA1C MFR BLD: 9.6 % (ref 0–5.6)

## 2018-11-26 PROCEDURE — 83036 HEMOGLOBIN GLYCOSYLATED A1C: CPT

## 2018-11-26 PROCEDURE — 36415 COLL VENOUS BLD VENIPUNCTURE: CPT

## 2018-11-26 PROCEDURE — 80053 COMPREHEN METABOLIC PANEL: CPT

## 2018-11-27 LAB
ALBUMIN SERPL BCP-MCNC: 3.7 G/DL (ref 3.2–4.9)
ALBUMIN/GLOB SERPL: 1.4 G/DL
ALP SERPL-CCNC: 46 U/L (ref 30–99)
ALT SERPL-CCNC: 18 U/L (ref 2–50)
ANION GAP SERPL CALC-SCNC: 7 MMOL/L (ref 0–11.9)
AST SERPL-CCNC: 17 U/L (ref 12–45)
BILIRUB SERPL-MCNC: 0.4 MG/DL (ref 0.1–1.5)
BUN SERPL-MCNC: 12 MG/DL (ref 8–22)
CALCIUM SERPL-MCNC: 8.9 MG/DL (ref 8.5–10.5)
CHLORIDE SERPL-SCNC: 103 MMOL/L (ref 96–112)
CO2 SERPL-SCNC: 25 MMOL/L (ref 20–33)
CREAT SERPL-MCNC: 0.8 MG/DL (ref 0.5–1.4)
GLOBULIN SER CALC-MCNC: 2.6 G/DL (ref 1.9–3.5)
GLUCOSE SERPL-MCNC: 220 MG/DL (ref 65–99)
POTASSIUM SERPL-SCNC: 4.6 MMOL/L (ref 3.6–5.5)
PROT SERPL-MCNC: 6.3 G/DL (ref 6–8.2)
SODIUM SERPL-SCNC: 135 MMOL/L (ref 135–145)

## 2018-12-11 DIAGNOSIS — R80.9 TYPE 2 DIABETES MELLITUS WITH ALBUMINURIA (HCC): ICD-10-CM

## 2018-12-11 DIAGNOSIS — J45.20 MILD INTERMITTENT ASTHMA WITHOUT COMPLICATION: ICD-10-CM

## 2018-12-11 DIAGNOSIS — E11.29 TYPE 2 DIABETES MELLITUS WITH ALBUMINURIA (HCC): ICD-10-CM

## 2018-12-11 RX ORDER — GLIPIZIDE 10 MG/1
TABLET ORAL
Qty: 180 TAB | Refills: 1 | Status: SHIPPED | OUTPATIENT
Start: 2018-12-11 | End: 2019-07-15 | Stop reason: SDUPTHER

## 2018-12-11 RX ORDER — ALBUTEROL SULFATE 90 UG/1
AEROSOL, METERED RESPIRATORY (INHALATION)
Qty: 1 INHALER | Refills: 3 | Status: SHIPPED | OUTPATIENT
Start: 2018-12-11 | End: 2019-08-27 | Stop reason: SDUPTHER

## 2018-12-11 RX ORDER — LISINOPRIL 2.5 MG/1
2.5 TABLET ORAL DAILY
Qty: 90 TAB | Refills: 1 | Status: SHIPPED | OUTPATIENT
Start: 2018-12-11 | End: 2019-07-15 | Stop reason: SDUPTHER

## 2018-12-11 RX ORDER — ATORVASTATIN CALCIUM 10 MG/1
10 TABLET, FILM COATED ORAL DAILY
Qty: 90 TAB | Refills: 1 | Status: SHIPPED | OUTPATIENT
Start: 2018-12-11 | End: 2020-01-08

## 2018-12-11 NOTE — TELEPHONE ENCOUNTER
Patient has recently been seen by PCP within the last 6 months per protocol (11/18). Will refill medications for 6 months. A1c  Has resolved, pt continues to take meds.   Lab Results   Component Value Date/Time    HBA1C 9.6 (H) 11/26/2018 07:46 AM      Lab Results   Component Value Date/Time    MALBCRT 140 (H) 08/20/2018 10:41 AM    MICROALBUR 10.3 08/20/2018 10:41 AM      Lab Results   Component Value Date/Time    ALKPHOSPHAT 46 11/26/2018 07:46 AM    ASTSGOT 17 11/26/2018 07:46 AM    ALTSGPT 18 11/26/2018 07:46 AM    TBILIRUBIN 0.4 11/26/2018 07:46 AM      Lab Results   Component Value Date/Time    CHOLSTRLTOT 191 08/20/2018 10:41 AM     (H) 08/20/2018 10:41 AM    HDL 33 (A) 08/20/2018 10:41 AM    TRIGLYCERIDE 83 08/20/2018 10:41 AM       Lab Results   Component Value Date/Time    SODIUM 135 11/26/2018 07:46 AM    POTASSIUM 4.6 11/26/2018 07:46 AM    CHLORIDE 103 11/26/2018 07:46 AM    CO2 25 11/26/2018 07:46 AM    GLUCOSE 220 (H) 11/26/2018 07:46 AM    BUN 12 11/26/2018 07:46 AM    CREATININE 0.80 11/26/2018 07:46 AM

## 2018-12-11 NOTE — TELEPHONE ENCOUNTER
1. Caller Name: Jerome                                         Call Back Number: [phone] (home)       Patient approves a detailed voicemail message: no    Pt called and wants all prescritpions sent to [facility]. Attempted to call but was on hold for 15 minutes. Please advise.

## 2019-02-11 ENCOUNTER — OFFICE VISIT (OUTPATIENT)
Dept: URGENT CARE | Facility: PHYSICIAN GROUP | Age: 64
End: 2019-02-11

## 2019-02-11 VITALS
SYSTOLIC BLOOD PRESSURE: 136 MMHG | DIASTOLIC BLOOD PRESSURE: 82 MMHG | BODY MASS INDEX: 26.66 KG/M2 | HEIGHT: 65 IN | HEART RATE: 68 BPM | RESPIRATION RATE: 16 BRPM | OXYGEN SATURATION: 98 % | WEIGHT: 160 LBS | TEMPERATURE: 98 F

## 2019-02-11 DIAGNOSIS — Z02.4 ENCOUNTER FOR COMMERCIAL DRIVING LICENSE (CDL) EXAM: ICD-10-CM

## 2019-02-11 PROCEDURE — 7100 PR DOT PHYSICAL: Performed by: PHYSICIAN ASSISTANT

## 2019-02-11 ASSESSMENT — VISUAL ACUITY
OS_CC: 20/25
OD_CC: 20/25

## 2019-02-11 NOTE — PROGRESS NOTES
"Subjective:      Jerome Mendoza is a 63 y.o. male who presents with Annual Exam (DOT physical )        Annual Exam     Patient presents today for DOT physical exam  Patient has establish hx of DM.  Uncontrolled but improved from last year  Close monitoring with PCP at this time.   Sees Opthalmology regularly.       ROS    See scanned sheets     Objective:     /82   Pulse 68   Temp 36.7 °C (98 °F)   Resp 16   Ht 1.66 m (5' 5.35\")   Wt 72.6 kg (160 lb)   SpO2 98%   BMI 26.34 kg/m²      Physical Exam      See scanned sheets       Assessment/Plan:     1. 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  Keep follow ups with PCP and specialists.         Harleen Dover P.A.-C.        "

## 2019-04-23 ENCOUNTER — OFFICE VISIT (OUTPATIENT)
Dept: MEDICAL GROUP | Facility: PHYSICIAN GROUP | Age: 64
End: 2019-04-23
Payer: COMMERCIAL

## 2019-04-23 VITALS
DIASTOLIC BLOOD PRESSURE: 80 MMHG | WEIGHT: 160 LBS | HEART RATE: 80 BPM | TEMPERATURE: 97.7 F | BODY MASS INDEX: 26.66 KG/M2 | OXYGEN SATURATION: 98 % | RESPIRATION RATE: 16 BRPM | SYSTOLIC BLOOD PRESSURE: 130 MMHG | HEIGHT: 65 IN

## 2019-04-23 DIAGNOSIS — E11.29 TYPE 2 DIABETES MELLITUS WITH ALBUMINURIA (HCC): ICD-10-CM

## 2019-04-23 DIAGNOSIS — R80.9 TYPE 2 DIABETES MELLITUS WITH ALBUMINURIA (HCC): ICD-10-CM

## 2019-04-23 DIAGNOSIS — M25.551 PAIN OF RIGHT HIP JOINT: ICD-10-CM

## 2019-04-23 PROCEDURE — 99214 OFFICE O/P EST MOD 30 MIN: CPT | Performed by: FAMILY MEDICINE

## 2019-04-23 RX ORDER — KETOROLAC TROMETHAMINE 30 MG/ML
30 INJECTION, SOLUTION INTRAMUSCULAR; INTRAVENOUS ONCE
Status: CANCELLED | OUTPATIENT
Start: 2019-04-23 | End: 2019-04-23

## 2019-04-23 RX ORDER — KETOROLAC TROMETHAMINE 30 MG/ML
30 INJECTION, SOLUTION INTRAMUSCULAR; INTRAVENOUS ONCE
Status: COMPLETED | OUTPATIENT
Start: 2019-04-23 | End: 2019-04-23

## 2019-04-23 RX ORDER — METHYLPREDNISOLONE 4 MG/1
TABLET ORAL
Qty: 21 TAB | Refills: 0 | Status: SHIPPED
Start: 2019-04-23 | End: 2020-02-10

## 2019-04-23 RX ADMIN — KETOROLAC TROMETHAMINE 30 MG: 30 INJECTION, SOLUTION INTRAMUSCULAR; INTRAVENOUS at 15:30

## 2019-04-23 ASSESSMENT — PATIENT HEALTH QUESTIONNAIRE - PHQ9: CLINICAL INTERPRETATION OF PHQ2 SCORE: 0

## 2019-04-23 NOTE — PROGRESS NOTES
cc: right hip pain      Subjective:     Jerome Mendoza is a 63 y.o. male presenting for the following:     Right hip pain: Patent has had difficulty with right hip pain for at least the last 2 years. It flairs every few months and it will be severe enough that it affects his sleep. 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     He is currently having a flair after a few long shifts. He was seen in urgent care in 12/2017 and given a medrol dosepack and a Toradol shot and this was very helpful. He feels like the pain relief allowed him to move more easily and the pain improved after this for many months.       Review of systems:  All others reviewed and are negative.       Current Outpatient Prescriptions:   •  MethylPREDNISolone (MEDROL DOSEPAK) 4 MG Tablet Therapy Pack, As directed on the packaging label., Disp: 21 Tab, Rfl: 0  •  lisinopril (PRINIVIL) 2.5 MG Tab, Take 1 Tab by mouth every day., Disp: 90 Tab, Rfl: 1  •  glipiZIDE (GLUCOTROL) 10 MG Tab, TAKE ONE TABLET BY MOUTH TWICE DAILY, Disp: 180 Tab, Rfl: 1  •  SITagliptin (JANUVIA) 100 MG Tab, Take 1 Tab by mouth every day., Disp: 90 Tab, Rfl: 1  •  metformin (GLUCOPHAGE) 1000 MG tablet, Take 1 Tab by mouth 2 times a day, with meals., Disp: 180 Tab, Rfl: 1  •  mometasone (ASMANEX 60 METERED DOSES) 220 MCG/INH inhaler, INHALE TWO PUFFS BY MOUTH ONCE DAILY, Disp: 3 Inhaler, Rfl: 3  •  albuterol (VENTOLIN HFA) 108 (90 Base) MCG/ACT Aero Soln inhalation aerosol, INHALE TWO PUFFS BY MOUTH EVERY 6 HOURS AS NEEDED FOR SHORTNESS OF BREATH, Disp: 1 Inhaler, Rfl: 3  •  atorvastatin (LIPITOR) 10 MG Tab, Take 1 Tab by mouth every day., Disp: 90 Tab, Rfl: 1  •  aspirin EC (ECOTRIN) 81 MG Tablet Delayed Response, Take 1 Tab by mouth every day., Disp: 90 Tab, Rfl: 0  •  Blood Glucose Monitoring Suppl SUPPLIES Misc, Test strips order: Test strips for One Touch Ultra meter. Sig: use TID and prn ssx high or low  "sugar #100 RF x 3, Disp: 100 Strip, Rfl: 3  •  Cholecalciferol (VITAMIN D3) 2000 UNIT Cap, Take  by mouth., Disp: , Rfl:     Current Facility-Administered Medications:   •  ketorolac (TORADOL) injection 30 mg, 30 mg, Intramuscular, Once, Priscilla Henriquez M.D.    Allergies, past medical history, past surgical history, family history, social history reviewed and updated    Objective:     Vitals: /80 (BP Location: Left arm, Patient Position: Sitting, BP Cuff Size: Large adult)   Pulse 80   Temp 36.5 °C (97.7 °F) (Temporal)   Resp 16   Ht 1.66 m (5' 5.35\")   Wt 72.6 kg (160 lb)   SpO2 98%   BMI 26.34 kg/m²   General: Alert, pleasant, NAD  Skin: Warm, dry, no rashes.  Musculoskeletal: Back without deformity. Good ROM. No tenderness.   Right buttock with mild tenderness to deep palpation. Hip with full ROM. Negative straight leg raise b/l. Full strength in both legs.   Neurological: patellar DTRs 2+ bilaterally. CN2-12 grossly intact  Psych:  Affect is normal,grooming is appropriate.    Assessment/Plan:     Jerome was seen today for hip pain.    Diagnoses and all orders for this visit:    Pain of right hip joint: Likely muscle strain from job. No weakness or warmth, low s/o ligament damage, septic joint, or severe arthritis. Patient warned that toradol does have a small risk of worsening bleeding or renal injury. Also, medrol dosepack will worsen his blood sugars, so he is encouraged to be very careful about his diet for the next 2 weeks.   He is interested in regular appointments with a chiropractor, as this problem seems primarily muscular.   -     MethylPREDNISolone (MEDROL DOSEPAK) 4 MG Tablet Therapy Pack; As directed on the packaging label.  -     REFERRAL TO CHIROPRACTIC  -     ketorolac (TORADOL) injection 30 mg; 1 mL by Intramuscular route Once.    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

## 2019-07-15 DIAGNOSIS — R80.9 TYPE 2 DIABETES MELLITUS WITH ALBUMINURIA (HCC): ICD-10-CM

## 2019-07-15 DIAGNOSIS — E11.29 TYPE 2 DIABETES MELLITUS WITH ALBUMINURIA (HCC): ICD-10-CM

## 2019-07-16 RX ORDER — SITAGLIPTIN 100 MG/1
TABLET, FILM COATED ORAL
Qty: 90 TAB | Refills: 0 | Status: SHIPPED | OUTPATIENT
Start: 2019-07-16 | End: 2019-09-26 | Stop reason: SDUPTHER

## 2019-07-16 RX ORDER — LISINOPRIL 2.5 MG/1
TABLET ORAL
Qty: 90 TAB | Refills: 0 | Status: SHIPPED | OUTPATIENT
Start: 2019-07-16 | End: 2019-09-26 | Stop reason: SDUPTHER

## 2019-07-16 RX ORDER — GLIPIZIDE 10 MG/1
TABLET ORAL
Qty: 180 TAB | Refills: 0 | Status: SHIPPED | OUTPATIENT
Start: 2019-07-16 | End: 2019-09-26 | Stop reason: SDUPTHER

## 2019-07-16 NOTE — TELEPHONE ENCOUNTER
Was the patient seen in the last year in this department? Yes    Does patient have an active prescription for medications requested? No     Received Request Via: Pharmacy      Pt met protocol?: Yes, last ov 4/19  BP Readings from Last 1 Encounters:   04/23/19 130/80     Lab Results   Component Value Date/Time    HBA1C 9.6 (H) 11/26/2018 07:46 AM

## 2019-07-18 NOTE — TELEPHONE ENCOUNTER
Patient is requesting a refill for his test strips to be sent to [facility]. [facility] too expensive and through [facility] it will be [facility].

## 2019-07-22 NOTE — TELEPHONE ENCOUNTER
----- Message from Melissa Eli D.O. sent at 5/2/2017  3:38 PM PDT -----  Please have patient schedule an appointment to go over results.  Thank you,  Dr. Eli   Detail Level: Zone

## 2019-08-12 ENCOUNTER — TELEPHONE (OUTPATIENT)
Dept: URGENT CARE | Facility: PHYSICIAN GROUP | Age: 64
End: 2019-08-12

## 2019-08-12 ENCOUNTER — HOSPITAL ENCOUNTER (OUTPATIENT)
Dept: LAB | Facility: MEDICAL CENTER | Age: 64
End: 2019-08-12
Attending: FAMILY MEDICINE
Payer: COMMERCIAL

## 2019-08-12 DIAGNOSIS — R80.9 TYPE 2 DIABETES MELLITUS WITH ALBUMINURIA (HCC): ICD-10-CM

## 2019-08-12 DIAGNOSIS — E11.29 TYPE 2 DIABETES MELLITUS WITH ALBUMINURIA (HCC): ICD-10-CM

## 2019-08-12 LAB
EST. AVERAGE GLUCOSE BLD GHB EST-MCNC: 217 MG/DL
HBA1C MFR BLD: 9.2 % (ref 0–5.6)

## 2019-08-12 PROCEDURE — 36415 COLL VENOUS BLD VENIPUNCTURE: CPT

## 2019-08-12 PROCEDURE — 83036 HEMOGLOBIN GLYCOSYLATED A1C: CPT

## 2019-08-12 RX ORDER — LANCETS 30 GAUGE
EACH MISCELLANEOUS
Qty: 100 EACH | Refills: 11 | Status: SHIPPED | OUTPATIENT
Start: 2019-08-12 | End: 2020-02-10

## 2019-08-12 NOTE — TELEPHONE ENCOUNTER
Patient came in because he received lancets for OneTouch Ultra but no longer uses it, uses [facility] now and needs a new Rx for them.

## 2019-08-27 DIAGNOSIS — J45.20 MILD INTERMITTENT ASTHMA WITHOUT COMPLICATION: ICD-10-CM

## 2019-08-27 RX ORDER — ALBUTEROL SULFATE 90 UG/1
AEROSOL, METERED RESPIRATORY (INHALATION)
Qty: 1 INHALER | Refills: 5 | Status: SHIPPED | OUTPATIENT
Start: 2019-08-27 | End: 2020-02-10 | Stop reason: SDUPTHER

## 2019-08-27 NOTE — TELEPHONE ENCOUNTER
Was the patient seen in the last year in this department? Yes    Does patient have an active prescription for medications requested? No     Received Request Via: Patient      Pt met protocol?: Yes, ov 4/19

## 2019-09-26 DIAGNOSIS — R80.9 TYPE 2 DIABETES MELLITUS WITH ALBUMINURIA (HCC): ICD-10-CM

## 2019-09-26 DIAGNOSIS — E11.29 TYPE 2 DIABETES MELLITUS WITH ALBUMINURIA (HCC): ICD-10-CM

## 2019-09-30 RX ORDER — GLIPIZIDE 10 MG/1
TABLET ORAL
Qty: 180 TAB | Refills: 0 | Status: SHIPPED | OUTPATIENT
Start: 2019-09-30 | End: 2020-01-08

## 2019-09-30 RX ORDER — LISINOPRIL 2.5 MG/1
2.5 TABLET ORAL DAILY
Qty: 90 TAB | Refills: 0 | Status: SHIPPED | OUTPATIENT
Start: 2019-09-30 | End: 2020-01-08

## 2019-09-30 NOTE — TELEPHONE ENCOUNTER
Phone Number Called: [phone] (home)       Call outcome: spoke to patient regarding message below

## 2019-09-30 NOTE — TELEPHONE ENCOUNTER
Last seen by PCP 4/19. Will send 3 months to pharmacy. Patient is due for an appointment. Please schedule.

## 2020-01-07 DIAGNOSIS — R80.9 TYPE 2 DIABETES MELLITUS WITH ALBUMINURIA (HCC): ICD-10-CM

## 2020-01-07 DIAGNOSIS — J45.20 MILD INTERMITTENT ASTHMA WITHOUT COMPLICATION: ICD-10-CM

## 2020-01-07 DIAGNOSIS — E11.29 TYPE 2 DIABETES MELLITUS WITH ALBUMINURIA (HCC): ICD-10-CM

## 2020-01-08 RX ORDER — SITAGLIPTIN 100 MG/1
TABLET, FILM COATED ORAL
Qty: 30 TAB | Refills: 0 | Status: SHIPPED | OUTPATIENT
Start: 2020-01-08 | End: 2020-02-10 | Stop reason: SDUPTHER

## 2020-01-08 RX ORDER — ATORVASTATIN CALCIUM 10 MG/1
TABLET, FILM COATED ORAL
Qty: 30 TAB | Refills: 0 | Status: SHIPPED | OUTPATIENT
Start: 2020-01-08 | End: 2020-02-10 | Stop reason: SDUPTHER

## 2020-01-08 RX ORDER — LISINOPRIL 2.5 MG/1
2.5 TABLET ORAL DAILY
Qty: 30 TAB | Refills: 0 | Status: SHIPPED | OUTPATIENT
Start: 2020-01-08 | End: 2020-02-10

## 2020-01-08 RX ORDER — GLIPIZIDE 10 MG/1
TABLET ORAL
Qty: 60 TAB | Refills: 0 | Status: SHIPPED | OUTPATIENT
Start: 2020-01-08 | End: 2020-02-10 | Stop reason: SDUPTHER

## 2020-01-08 NOTE — TELEPHONE ENCOUNTER
Was the patient seen in the last year in this department? Yes    Does patient have an active prescription for medications requested? No     Received Request Via: Patient      Pt met protocol?: No, OV 4/19   Lab Results   Component Value Date/Time    HBA1C 9.2 (H) 08/12/2019 08:04 AM     BP Readings from Last 1 Encounters:   04/23/19 130/80     Lab Results   Component Value Date/Time    CHOLSTRLTOT 191 08/20/2018 10:41 AM     (H) 08/20/2018 10:41 AM    HDL 33 (A) 08/20/2018 10:41 AM    TRIGLYCERIDE 83 08/20/2018 10:41 AM       Lab Results   Component Value Date/Time    SODIUM 135 11/26/2018 07:46 AM    POTASSIUM 4.6 11/26/2018 07:46 AM    CHLORIDE 103 11/26/2018 07:46 AM    CO2 25 11/26/2018 07:46 AM    GLUCOSE 220 (H) 11/26/2018 07:46 AM    BUN 12 11/26/2018 07:46 AM    CREATININE 0.80 11/26/2018 07:46 AM     Lab Results   Component Value Date/Time    ALKPHOSPHAT 46 11/26/2018 07:46 AM    ASTSGOT 17 11/26/2018 07:46 AM    ALTSGPT 18 11/26/2018 07:46 AM    TBILIRUBIN 0.4 11/26/2018 07:46 AM

## 2020-02-07 ENCOUNTER — OFFICE VISIT (OUTPATIENT)
Dept: URGENT CARE | Facility: PHYSICIAN GROUP | Age: 65
End: 2020-02-07

## 2020-02-07 VITALS
HEART RATE: 78 BPM | OXYGEN SATURATION: 94 % | BODY MASS INDEX: 26.96 KG/M2 | SYSTOLIC BLOOD PRESSURE: 130 MMHG | RESPIRATION RATE: 16 BRPM | HEIGHT: 65 IN | DIASTOLIC BLOOD PRESSURE: 68 MMHG | TEMPERATURE: 98.1 F | WEIGHT: 161.8 LBS

## 2020-02-07 DIAGNOSIS — Z02.89 ENCOUNTER FOR EXAMINATION REQUIRED BY DEPARTMENT OF TRANSPORTATION (DOT): ICD-10-CM

## 2020-02-07 PROCEDURE — 7100 PR DOT PHYSICAL: Performed by: PHYSICIAN ASSISTANT

## 2020-02-07 RX ORDER — TRIAMCINOLONE ACETONIDE 40 MG/ML
INJECTION, SUSPENSION INTRA-ARTICULAR; INTRAMUSCULAR
COMMUNITY
Start: 2005-03-10 | End: 2020-02-10

## 2020-02-07 RX ORDER — DESONIDE 0.5 MG/G
CREAM TOPICAL
COMMUNITY
Start: 2005-02-22 | End: 2020-02-10

## 2020-02-10 ENCOUNTER — OFFICE VISIT (OUTPATIENT)
Dept: MEDICAL GROUP | Facility: PHYSICIAN GROUP | Age: 65
End: 2020-02-10
Payer: COMMERCIAL

## 2020-02-10 VITALS
WEIGHT: 160.5 LBS | TEMPERATURE: 97.8 F | HEIGHT: 65 IN | HEART RATE: 88 BPM | OXYGEN SATURATION: 94 % | SYSTOLIC BLOOD PRESSURE: 134 MMHG | DIASTOLIC BLOOD PRESSURE: 88 MMHG | BODY MASS INDEX: 26.74 KG/M2

## 2020-02-10 DIAGNOSIS — R80.9 TYPE 2 DIABETES MELLITUS WITH ALBUMINURIA (HCC): ICD-10-CM

## 2020-02-10 DIAGNOSIS — Z12.11 SCREENING FOR COLORECTAL CANCER: ICD-10-CM

## 2020-02-10 DIAGNOSIS — J45.40 MODERATE PERSISTENT ASTHMA WITHOUT COMPLICATION: ICD-10-CM

## 2020-02-10 DIAGNOSIS — Z12.12 SCREENING FOR COLORECTAL CANCER: ICD-10-CM

## 2020-02-10 DIAGNOSIS — E78.2 MIXED HYPERLIPIDEMIA: ICD-10-CM

## 2020-02-10 DIAGNOSIS — Z12.5 SCREENING FOR PROSTATE CANCER: ICD-10-CM

## 2020-02-10 DIAGNOSIS — Z23 NEED FOR VACCINATION: ICD-10-CM

## 2020-02-10 DIAGNOSIS — E11.29 TYPE 2 DIABETES MELLITUS WITH ALBUMINURIA (HCC): ICD-10-CM

## 2020-02-10 PROCEDURE — 90471 IMMUNIZATION ADMIN: CPT | Performed by: FAMILY MEDICINE

## 2020-02-10 PROCEDURE — 99214 OFFICE O/P EST MOD 30 MIN: CPT | Mod: 25 | Performed by: FAMILY MEDICINE

## 2020-02-10 PROCEDURE — 90670 PCV13 VACCINE IM: CPT | Performed by: FAMILY MEDICINE

## 2020-02-10 RX ORDER — ATORVASTATIN CALCIUM 10 MG/1
10 TABLET, FILM COATED ORAL DAILY
Qty: 90 TAB | Refills: 3 | Status: SHIPPED | OUTPATIENT
Start: 2020-02-10 | End: 2020-11-02

## 2020-02-10 RX ORDER — ALBUTEROL SULFATE 90 UG/1
AEROSOL, METERED RESPIRATORY (INHALATION)
Qty: 3 INHALER | Refills: 3 | Status: SHIPPED | OUTPATIENT
Start: 2020-02-10 | End: 2021-02-22 | Stop reason: SDUPTHER

## 2020-02-10 RX ORDER — LISINOPRIL 5 MG/1
5 TABLET ORAL DAILY
Qty: 90 TAB | Refills: 3 | Status: SHIPPED | OUTPATIENT
Start: 2020-02-10 | End: 2020-03-09

## 2020-02-10 RX ORDER — METFORMIN HYDROCHLORIDE 1000 MG/1
1 TABLET, FILM COATED, EXTENDED RELEASE ORAL 2 TIMES DAILY
Qty: 180 TAB | Refills: 3 | Status: SHIPPED | OUTPATIENT
Start: 2020-02-10 | End: 2021-05-05 | Stop reason: SDUPTHER

## 2020-02-10 RX ORDER — GLIPIZIDE 10 MG/1
10 TABLET ORAL 2 TIMES DAILY
Qty: 60 TAB | Refills: 3 | Status: SHIPPED | OUTPATIENT
Start: 2020-02-10 | End: 2020-05-11 | Stop reason: SDUPTHER

## 2020-02-10 ASSESSMENT — PATIENT HEALTH QUESTIONNAIRE - PHQ9: CLINICAL INTERPRETATION OF PHQ2 SCORE: 0

## 2020-02-10 NOTE — PROGRESS NOTES
cc: Diabetes      Subjective:     Jerome Mendoza is a 64 y.o. male presenting for the following:     Diabetes-patient has been taking his metformin, Januvia, glipizide, lisinopril daily.  He has stopped exercising and his blood sugars have gone up since then.  He denies any hypoglycemia. Patient denies any polyuria/polydipsia, rashes, recurrent infections, changes in vision, changes in sensation.    He has not been taking the atorvastatin.  He thinks he does have some muscle pain with this but it has not been severe.  He was unsure if this medication was fully necessary.  He has never had a heart attack or stroke.      Review of systems:  All others reviewed and are negative.       Current Outpatient Medications:   •  Mometasone Furoate (ASMANEX, 120 METERED DOSES, INH), Inhale 2 Puffs by mouth every day., Disp: , Rfl:   •  atorvastatin (LIPITOR) 10 MG Tab, Take 1 Tab by mouth every day., Disp: 90 Tab, Rfl: 3  •  glipiZIDE (GLUCOTROL) 10 MG Tab, Take 1 Tab by mouth 2 times a day., Disp: 60 Tab, Rfl: 3  •  SITagliptin (JANUVIA) 100 MG Tab, Take 1 Tab by mouth every day., Disp: 30 Tab, Rfl: 5  •  lisinopril (PRINIVIL) 5 MG Tab, Take 1 Tab by mouth every day., Disp: 90 Tab, Rfl: 3  •  metformin ER modified (GLUMETZA) 1000 MG TABLET SR 24 HR, Take 1 Tab by mouth 2 Times a Day., Disp: 180 Tab, Rfl: 3  •  albuterol (VENTOLIN HFA) 108 (90 Base) MCG/ACT Aero Soln inhalation aerosol, INHALE TWO PUFFS BY MOUTH EVERY 6 HOURS AS NEEDED FOR SHORTNESS OF BREATH, Disp: 3 Inhaler, Rfl: 3  •  mometasone (ASMANEX) 220 MCG/INH inhaler, USE 2 INHALATIONS ONCE DAILY, Disp: 1 Inhaler, Rfl: 0  •  aspirin EC (ECOTRIN) 81 MG Tablet Delayed Response, Take 1 Tab by mouth every day., Disp: 90 Tab, Rfl: 0  •  triamcinolone (TRIESENCE) 40 MG/ML Suspension, by Intramuscular route., Disp: , Rfl:   •  Cholecalciferol (VITAMIN D3) 2000 UNIT Cap, Take  by mouth., Disp: , Rfl:     Allergies, past medical history, past surgical history, family  "history, social history reviewed and updated    Objective:     Vitals: /88 (BP Location: Right arm, Patient Position: Sitting, BP Cuff Size: Adult)   Pulse 88   Temp 36.6 °C (97.8 °F) (Temporal)   Ht 1.66 m (5' 5.35\")   Wt 72.8 kg (160 lb 8 oz)   SpO2 94%   BMI 26.42 kg/m²   General: Alert, pleasant, NAD  HEENT: Normocephalic.   EOMI, no icterus or pallor.    Heart: Regular rate and rhythm.  S1 and S2 normal.  No murmurs appreciated.  Respiratory: Normal respiratory effort.  Clear to auscultation bilaterally.  Neurological:  CN2-12 grossly intact  Psych:  Affect is normal, judgement is good, grooming is appropriate.    Assessment/Plan:     Jerome was seen today for follow-up.    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  Will refill current medications now and have close follow-up after patient gets labs done as will likely discontinue glipizide and Januvia and consider Trulicity/dapagliflozin.  -     Comp Metabolic Panel; Future  -     HEMOGLOBIN A1C; Future  -     MICROALBUMIN CREAT RATIO URINE (LAB COLLECT); Future  -     glipiZIDE (GLUCOTROL) 10 MG Tab; Take 1 Tab by mouth 2 times a day.  -     SITagliptin (JANUVIA) 100 MG Tab; Take 1 Tab by mouth every day.  -     lisinopril (PRINIVIL) 5 MG Tab; Take 1 Tab by mouth every day.  -     metformin ER modified (GLUMETZA) 1000 MG TABLET SR 24 HR; Take 1 Tab by mouth 2 Times a Day.    Screening for prostate cancer Patient counseled on the risks and benefits of PSA testing.  Patient verbalizes understanding of risk of false positive, false negative, and need for referral and possibly prostate biopsy if positive.  -     PROSTATE SPECIFIC AG SCREENING; Future    Mixed hyperlipidemia: Reviewed risks and benefits of atorvastatin and patient does agree to try this again.  Explained that we will monitor hepatic " function and he should let me know if he gets any side effect.  -     Lipid Profile; Future  -     atorvastatin (LIPITOR) 10 MG Tab; Take 1 Tab by mouth every day.    Screening for colorectal cancer Patient agrees to screening for colon cancer.  Currently without symptoms- no abdominal pain, changes in bowel habit, weight loss, blood in stool, or melena. Denies history of polyps.   -     COLOGUARD (FIT DNA)    Need for vaccination Patient due for vaccination.  Patient warned of possible side effect including pain, reaction at injection site, fatigue, low-grade fever.  Also, patient warned of uncommon severe reactions including allergic reaction/anaphylaxis.   -     Pneumococcal Conjugate Vaccine 13-Valent    Moderate persistent asthma without complication: Patient has been using his asthma inhaler more than 3 times per week.  He is having seasonal allergy right now.  He does have Asmanex at home so I suggest he start taking this as well.  Explained he should rinse mouth after using.  -     albuterol (VENTOLIN HFA) 108 (90 Base) MCG/ACT Aero Soln inhalation aerosol; INHALE TWO PUFFS BY MOUTH EVERY 6 HOURS AS NEEDED FOR SHORTNESS OF BREATH      Return in about 4 weeks (around 3/9/2020), or if symptoms worsen or fail to improve.

## 2020-02-24 ENCOUNTER — HOSPITAL ENCOUNTER (OUTPATIENT)
Dept: LAB | Facility: MEDICAL CENTER | Age: 65
End: 2020-02-24
Attending: FAMILY MEDICINE
Payer: COMMERCIAL

## 2020-02-24 DIAGNOSIS — Z12.5 SCREENING FOR PROSTATE CANCER: ICD-10-CM

## 2020-02-24 DIAGNOSIS — E11.29 TYPE 2 DIABETES MELLITUS WITH ALBUMINURIA (HCC): ICD-10-CM

## 2020-02-24 DIAGNOSIS — E78.2 MIXED HYPERLIPIDEMIA: ICD-10-CM

## 2020-02-24 DIAGNOSIS — R80.9 TYPE 2 DIABETES MELLITUS WITH ALBUMINURIA (HCC): ICD-10-CM

## 2020-02-24 LAB
ALBUMIN SERPL BCP-MCNC: 4 G/DL (ref 3.2–4.9)
ALBUMIN/GLOB SERPL: 1.3 G/DL
ALP SERPL-CCNC: 43 U/L (ref 30–99)
ALT SERPL-CCNC: 15 U/L (ref 2–50)
ANION GAP SERPL CALC-SCNC: 4 MMOL/L (ref 0–11.9)
AST SERPL-CCNC: 14 U/L (ref 12–45)
BILIRUB SERPL-MCNC: 0.6 MG/DL (ref 0.1–1.5)
BUN SERPL-MCNC: 14 MG/DL (ref 8–22)
CALCIUM SERPL-MCNC: 9.7 MG/DL (ref 8.5–10.5)
CHLORIDE SERPL-SCNC: 100 MMOL/L (ref 96–112)
CHOLEST SERPL-MCNC: 218 MG/DL (ref 100–199)
CO2 SERPL-SCNC: 26 MMOL/L (ref 20–33)
CREAT SERPL-MCNC: 1.01 MG/DL (ref 0.5–1.4)
CREAT UR-MCNC: 30.8 MG/DL
EST. AVERAGE GLUCOSE BLD GHB EST-MCNC: 232 MG/DL
FASTING STATUS PATIENT QL REPORTED: NORMAL
GLOBULIN SER CALC-MCNC: 3 G/DL (ref 1.9–3.5)
GLUCOSE SERPL-MCNC: 353 MG/DL (ref 65–99)
HBA1C MFR BLD: 9.7 % (ref 0–5.6)
HDLC SERPL-MCNC: 37 MG/DL
LDLC SERPL CALC-MCNC: 153 MG/DL
MICROALBUMIN UR-MCNC: 44.2 MG/DL
MICROALBUMIN/CREAT UR: 1435 MG/G (ref 0–30)
POTASSIUM SERPL-SCNC: 5.1 MMOL/L (ref 3.6–5.5)
PROT SERPL-MCNC: 7 G/DL (ref 6–8.2)
SODIUM SERPL-SCNC: 130 MMOL/L (ref 135–145)
TRIGL SERPL-MCNC: 141 MG/DL (ref 0–149)

## 2020-02-24 PROCEDURE — 84153 ASSAY OF PSA TOTAL: CPT

## 2020-02-24 PROCEDURE — 82570 ASSAY OF URINE CREATININE: CPT

## 2020-02-24 PROCEDURE — 36415 COLL VENOUS BLD VENIPUNCTURE: CPT

## 2020-02-24 PROCEDURE — 80061 LIPID PANEL: CPT

## 2020-02-24 PROCEDURE — 80053 COMPREHEN METABOLIC PANEL: CPT

## 2020-02-24 PROCEDURE — 83036 HEMOGLOBIN GLYCOSYLATED A1C: CPT

## 2020-02-24 PROCEDURE — 82043 UR ALBUMIN QUANTITATIVE: CPT

## 2020-02-25 LAB — PSA SERPL-MCNC: 0.73 NG/ML (ref 0–4)

## 2020-03-09 ENCOUNTER — OFFICE VISIT (OUTPATIENT)
Dept: MEDICAL GROUP | Facility: PHYSICIAN GROUP | Age: 65
End: 2020-03-09
Payer: COMMERCIAL

## 2020-03-09 VITALS
SYSTOLIC BLOOD PRESSURE: 150 MMHG | BODY MASS INDEX: 27.63 KG/M2 | WEIGHT: 165.8 LBS | TEMPERATURE: 98.1 F | HEART RATE: 74 BPM | HEIGHT: 65 IN | OXYGEN SATURATION: 97 % | DIASTOLIC BLOOD PRESSURE: 90 MMHG

## 2020-03-09 DIAGNOSIS — Z12.11 SCREENING FOR COLON CANCER: ICD-10-CM

## 2020-03-09 DIAGNOSIS — E11.29 TYPE 2 DIABETES MELLITUS WITH ALBUMINURIA (HCC): ICD-10-CM

## 2020-03-09 DIAGNOSIS — E11.59 HYPERTENSION ASSOCIATED WITH DIABETES (HCC): ICD-10-CM

## 2020-03-09 DIAGNOSIS — I15.2 HYPERTENSION ASSOCIATED WITH DIABETES (HCC): ICD-10-CM

## 2020-03-09 DIAGNOSIS — R80.9 TYPE 2 DIABETES MELLITUS WITH ALBUMINURIA (HCC): ICD-10-CM

## 2020-03-09 PROCEDURE — 99214 OFFICE O/P EST MOD 30 MIN: CPT | Performed by: FAMILY MEDICINE

## 2020-03-09 RX ORDER — LISINOPRIL 10 MG/1
10 TABLET ORAL DAILY
Qty: 90 TAB | Refills: 3 | Status: SHIPPED | OUTPATIENT
Start: 2020-03-09 | End: 2021-03-18 | Stop reason: SDUPTHER

## 2020-03-09 NOTE — PROGRESS NOTES
cc: DMII      Subjective:     Jerome Mendoza is a 64 y.o. male presenting for the following:     DMII: Patient is now taking glipizide, metformin, sitagliptin regularly.  He denies any hypoglycemia. Patient denies any polyuria/polydipsia, rashes, recurrent infections, changes in vision, changes in sensation.    Patient does have an elevated blood pressure today. Patient denies any chest pain, shortness of breath, palpitations, swelling, or lightheadedness.    Review of systems:  All others reviewed and are negative.       Current Outpatient Medications:   •  lisinopril (PRINIVIL) 10 MG Tab, Take 1 Tab by mouth every day., Disp: 90 Tab, Rfl: 3  •  Mometasone Furoate (ASMANEX, 120 METERED DOSES, INH), Inhale 2 Puffs by mouth every day., Disp: , Rfl:   •  atorvastatin (LIPITOR) 10 MG Tab, Take 1 Tab by mouth every day., Disp: 90 Tab, Rfl: 3  •  glipiZIDE (GLUCOTROL) 10 MG Tab, Take 1 Tab by mouth 2 times a day., Disp: 60 Tab, Rfl: 3  •  SITagliptin (JANUVIA) 100 MG Tab, Take 1 Tab by mouth every day., Disp: 30 Tab, Rfl: 5  •  metformin ER modified (GLUMETZA) 1000 MG TABLET SR 24 HR, Take 1 Tab by mouth 2 Times a Day., Disp: 180 Tab, Rfl: 3  •  albuterol (VENTOLIN HFA) 108 (90 Base) MCG/ACT Aero Soln inhalation aerosol, INHALE TWO PUFFS BY MOUTH EVERY 6 HOURS AS NEEDED FOR SHORTNESS OF BREATH, Disp: 3 Inhaler, Rfl: 3  •  triamcinolone (TRIESENCE) 40 MG/ML Suspension, by Intramuscular route., Disp: , Rfl:   •  mometasone (ASMANEX) 220 MCG/INH inhaler, USE 2 INHALATIONS ONCE DAILY, Disp: 1 Inhaler, Rfl: 0  •  aspirin EC (ECOTRIN) 81 MG Tablet Delayed Response, Take 1 Tab by mouth every day., Disp: 90 Tab, Rfl: 0  •  Cholecalciferol (VITAMIN D3) 2000 UNIT Cap, Take  by mouth., Disp: , Rfl:     Allergies, past medical history, past surgical history, family history, social history reviewed and updated    Objective:     Vitals: /90 (BP Location: Left arm, Patient Position: Sitting, BP Cuff Size: Adult)   Pulse 74   " Temp 36.7 °C (98.1 °F) (Temporal)   Ht 1.66 m (5' 5.35\")   Wt 75.2 kg (165 lb 12.8 oz)   SpO2 97%   BMI 27.30 kg/m²   General: Alert, pleasant, NAD  HEENT: Normocephalic.   Heart: Regular rate and rhythm.  S1 and S2 normal.  No murmurs appreciated.  Respiratory: Normal respiratory effort.  Clear to auscultation bilaterally.  Skin: Warm, dry, no rashes in exposed areas.  Psych:  Affect is normal, judgement is good, grooming is appropriate.    Assessment/Plan:     Jerome was seen today for follow-up.    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 I explained that he will likely be able to better control his diabetes with non-insulin alternatives.  He would be interested in seeing endocrinology for this to trial medications that may be expensive and to avoid any hypoglycemia.  -     REFERRAL TO ENDOCRINOLOGY    Hypertension associated with diabetes (HCC): Patient had not had elevated blood pressures in the past but was slightly high on last visit and on this visit.  Will increase lisinopril from 5 mg to 10 mg and will monitor on next appointment.  Patient warned of common side effects of lisinopril.  -     lisinopril (PRINIVIL) 10 MG Tab; Take 1 Tab by mouth every day.    Screening for colon cancer Patient agrees to screening for colon cancer.  Currently without symptoms- no abdominal pain, changes in bowel habit, weight loss, blood in stool, or melena. Denies history of polyps.   -     OCCULT BLOOD FECES IMMUNOASSAY; Future      Return in about 3 months (around 6/9/2020), or if symptoms worsen or fail to improve.  "

## 2020-05-11 DIAGNOSIS — R80.9 TYPE 2 DIABETES MELLITUS WITH ALBUMINURIA (HCC): ICD-10-CM

## 2020-05-11 DIAGNOSIS — E11.29 TYPE 2 DIABETES MELLITUS WITH ALBUMINURIA (HCC): ICD-10-CM

## 2020-05-11 NOTE — TELEPHONE ENCOUNTER
Was the patient seen in the last year in this department? Yes    Does patient have an active prescription for medications requested? No     Received Request Via: Pharmacy      Pt met protocol?: Yes, OV 3/20   Lab Results   Component Value Date/Time    HBA1C 9.7 (H) 02/24/2020 08:45 AM

## 2020-05-13 RX ORDER — GLIPIZIDE 10 MG/1
10 TABLET ORAL 2 TIMES DAILY
Qty: 180 TAB | Refills: 1 | Status: SHIPPED | OUTPATIENT
Start: 2020-05-13 | End: 2020-10-29

## 2020-05-13 NOTE — TELEPHONE ENCOUNTER
Patient has recently been seen by PCP within the last 6 months per protocol (3/20). Will refill medications for 6 months.  Lab Results   Component Value Date/Time    HBA1C 9.7 (H) 02/24/2020 08:45 AM      Lab Results   Component Value Date/Time    MALBCRT 1435 (H) 02/24/2020 08:44 AM    MICROALBUR 44.2 02/24/2020 08:44 AM      Lab Results   Component Value Date/Time    ALKPHOSPHAT 43 02/24/2020 08:45 AM    ASTSGOT 14 02/24/2020 08:45 AM    ALTSGPT 15 02/24/2020 08:45 AM    TBILIRUBIN 0.6 02/24/2020 08:45 AM

## 2020-10-10 ENCOUNTER — OFFICE VISIT (OUTPATIENT)
Dept: URGENT CARE | Facility: PHYSICIAN GROUP | Age: 65
End: 2020-10-10
Payer: COMMERCIAL

## 2020-10-10 VITALS
HEART RATE: 72 BPM | SYSTOLIC BLOOD PRESSURE: 118 MMHG | OXYGEN SATURATION: 98 % | RESPIRATION RATE: 18 BRPM | DIASTOLIC BLOOD PRESSURE: 70 MMHG | BODY MASS INDEX: 27.49 KG/M2 | HEIGHT: 65 IN | WEIGHT: 165 LBS | TEMPERATURE: 98.6 F

## 2020-10-10 DIAGNOSIS — R30.0 DYSURIA: ICD-10-CM

## 2020-10-10 DIAGNOSIS — L30.9 DERMATITIS: ICD-10-CM

## 2020-10-10 LAB
APPEARANCE UR: CLEAR
BILIRUB UR STRIP-MCNC: NORMAL MG/DL
COLOR UR AUTO: YELLOW
GLUCOSE UR STRIP.AUTO-MCNC: 500 MG/DL
KETONES UR STRIP.AUTO-MCNC: NORMAL MG/DL
LEUKOCYTE ESTERASE UR QL STRIP.AUTO: NORMAL
NITRITE UR QL STRIP.AUTO: NORMAL
PH UR STRIP.AUTO: 7 [PH] (ref 5–8)
PROT UR QL STRIP: 100 MG/DL
RBC UR QL AUTO: NORMAL
SP GR UR STRIP.AUTO: 1.01
UROBILINOGEN UR STRIP-MCNC: 0.2 MG/DL

## 2020-10-10 PROCEDURE — 81002 URINALYSIS NONAUTO W/O SCOPE: CPT | Performed by: NURSE PRACTITIONER

## 2020-10-10 PROCEDURE — 99214 OFFICE O/P EST MOD 30 MIN: CPT | Performed by: NURSE PRACTITIONER

## 2020-10-10 RX ORDER — METHYLPREDNISOLONE 4 MG/1
TABLET ORAL
Qty: 21 TAB | Refills: 0 | Status: SHIPPED | OUTPATIENT
Start: 2020-10-10 | End: 2020-10-10

## 2020-10-10 RX ORDER — METHYLPREDNISOLONE 4 MG/1
TABLET ORAL
Qty: 21 TAB | Refills: 0 | Status: SHIPPED | OUTPATIENT
Start: 2020-10-10 | End: 2020-10-29

## 2020-10-10 NOTE — PROGRESS NOTES
Subjective:      Jerome Mendoza is a 64 y.o. male who presents with Rash (x 2.5 weeks ago under arm pit, foam in urine, has not been able to see pcp )    Past Medical History:   Diagnosis Date   • Asthma 6/19/2014   • Hearing loss of both ears 6/19/2014   • Hyperlipidemia 6/19/2014   • Type II or unspecified type diabetes mellitus without mention of complication, not stated as uncontrolled 6/19/2014   • Wears hearing aid 6/19/2014     Social History     Socioeconomic History   • Marital status:      Spouse name: Not on file   • Number of children: Not on file   • Years of education: Not on file   • Highest education level: Not on file   Occupational History   • Not on file   Social Needs   • Financial resource strain: Not on file   • Food insecurity     Worry: Not on file     Inability: Not on file   • Transportation needs     Medical: Not on file     Non-medical: Not on file   Tobacco Use   • Smoking status: Never Smoker   • Smokeless tobacco: Never Used   • Tobacco comment: avoid all tobacco products   Substance and Sexual Activity   • Alcohol use: No     Alcohol/week: 0.0 oz   • Drug use: No   • Sexual activity: Yes     Partners: Female   Lifestyle   • Physical activity     Days per week: Not on file     Minutes per session: Not on file   • Stress: Not on file   Relationships   • Social connections     Talks on phone: Not on file     Gets together: Not on file     Attends Rastafari service: Not on file     Active member of club or organization: Not on file     Attends meetings of clubs or organizations: Not on file     Relationship status: Not on file   • Intimate partner violence     Fear of current or ex partner: Not on file     Emotionally abused: Not on file     Physically abused: Not on file     Forced sexual activity: Not on file   Other Topics Concern   • Not on file   Social History Narrative   • Not on file     Family History   Problem Relation Age of Onset   • Asthma Mother    • Diabetes  "Father    • Psychiatric Illness Father         dementia   • Heart Disease Neg Hx    • Stroke Neg Hx    • Cancer Neg Hx        Allergies: Penicillin g    Patient is a 64-year-old male who presents today with complaint of rash and itching to the left axilla and across the anterior aspect of the neck and right forearm.  Rashes been ongoing for several weeks.  He has been trying over-the-counter hydrocortisone without relief.  He does not know specifically what might be triggering this.  He has had no new soaps, lotions, or medications.  He has a history of type 2 diabetes, states he does not routinely check his blood sugar at home.          Rash  This is a new problem. The current episode started 1 to 4 weeks ago. The problem is unchanged. Location: axilla. The rash is characterized by redness, swelling and itchiness. It is unknown if there was an exposure to a precipitant.       Review of Systems   Genitourinary:        Has noted foam in his urine   Skin: Positive for rash.   All other systems reviewed and are negative.         Objective:     /70 (BP Location: Right arm, Patient Position: Sitting, BP Cuff Size: Adult long)   Pulse 72   Temp 37 °C (98.6 °F) (Tympanic)   Resp 18   Ht 1.651 m (5' 5\")   Wt 74.8 kg (165 lb)   SpO2 98%   BMI 27.46 kg/m²      Physical Exam  Vitals signs reviewed.   Constitutional:       Appearance: Normal appearance.   Neck:      Musculoskeletal: Normal range of motion and neck supple.   Cardiovascular:      Rate and Rhythm: Normal rate and regular rhythm.      Pulses: Normal pulses.      Heart sounds: Normal heart sounds.   Pulmonary:      Effort: Pulmonary effort is normal.      Breath sounds: Normal breath sounds.   Musculoskeletal: Normal range of motion.   Skin:     General: Skin is warm and dry.      Capillary Refill: Capillary refill takes less than 2 seconds.      Findings: Rash present.             Comments: Red dry patchy rash in the left axilla, right forearm and " across the anterior neck.   Neurological:      Mental Status: He is alert and oriented to person, place, and time.   Psychiatric:         Mood and Affect: Mood normal.         Behavior: Behavior normal.         Thought Content: Thought content normal.         Judgment: Judgment normal.     poct glucose: unable to perform due to equipment malfunction    UA: protein 100 mg/dL, 500 mg/dL glucose     Discussed use of topical medication for the current rash as most likely patient's blood sugar is elevated.  Patient states he does not want a topical medication at this time and is requesting oral prednisone.  States he will monitor glucose and follow-up with his primary doctor.     Assessment/Plan:        1. Dermatitis  2. Proteinuria    Follow up with PCP  Prednisone   Follow-up in urgent care otherwise for any further questions or concerns

## 2020-10-19 DIAGNOSIS — E11.29 TYPE 2 DIABETES MELLITUS WITH ALBUMINURIA (HCC): ICD-10-CM

## 2020-10-19 DIAGNOSIS — R80.9 TYPE 2 DIABETES MELLITUS WITH ALBUMINURIA (HCC): ICD-10-CM

## 2020-10-22 ENCOUNTER — NON-PROVIDER VISIT (OUTPATIENT)
Dept: MEDICAL GROUP | Facility: PHYSICIAN GROUP | Age: 65
End: 2020-10-22
Payer: COMMERCIAL

## 2020-10-22 ENCOUNTER — HOSPITAL ENCOUNTER (OUTPATIENT)
Dept: LAB | Facility: MEDICAL CENTER | Age: 65
End: 2020-10-22
Attending: NURSE PRACTITIONER
Payer: COMMERCIAL

## 2020-10-22 DIAGNOSIS — R80.9 TYPE 2 DIABETES MELLITUS WITH ALBUMINURIA (HCC): ICD-10-CM

## 2020-10-22 DIAGNOSIS — E11.29 TYPE 2 DIABETES MELLITUS WITH ALBUMINURIA (HCC): ICD-10-CM

## 2020-10-22 LAB
ALBUMIN SERPL BCP-MCNC: 3.7 G/DL (ref 3.2–4.9)
ALBUMIN/GLOB SERPL: 1.2 G/DL
ALP SERPL-CCNC: 73 U/L (ref 30–99)
ALT SERPL-CCNC: 19 U/L (ref 2–50)
ANION GAP SERPL CALC-SCNC: 9 MMOL/L (ref 7–16)
AST SERPL-CCNC: 13 U/L (ref 12–45)
BILIRUB SERPL-MCNC: 0.3 MG/DL (ref 0.1–1.5)
BUN SERPL-MCNC: 21 MG/DL (ref 8–22)
CALCIUM SERPL-MCNC: 9.3 MG/DL (ref 8.5–10.5)
CHLORIDE SERPL-SCNC: 96 MMOL/L (ref 96–112)
CHOLEST SERPL-MCNC: 242 MG/DL (ref 100–199)
CO2 SERPL-SCNC: 25 MMOL/L (ref 20–33)
CREAT SERPL-MCNC: 1.04 MG/DL (ref 0.5–1.4)
GLOBULIN SER CALC-MCNC: 3 G/DL (ref 1.9–3.5)
GLUCOSE SERPL-MCNC: 483 MG/DL (ref 65–99)
HDLC SERPL-MCNC: 31 MG/DL
LDLC SERPL CALC-MCNC: 167 MG/DL
POTASSIUM SERPL-SCNC: 5 MMOL/L (ref 3.6–5.5)
PROT SERPL-MCNC: 6.7 G/DL (ref 6–8.2)
SODIUM SERPL-SCNC: 130 MMOL/L (ref 135–145)
TRIGL SERPL-MCNC: 218 MG/DL (ref 0–149)

## 2020-10-22 PROCEDURE — 36415 COLL VENOUS BLD VENIPUNCTURE: CPT

## 2020-10-22 PROCEDURE — 82570 ASSAY OF URINE CREATININE: CPT

## 2020-10-22 PROCEDURE — 99214 OFFICE O/P EST MOD 30 MIN: CPT | Performed by: INTERNAL MEDICINE

## 2020-10-22 PROCEDURE — 82043 UR ALBUMIN QUANTITATIVE: CPT

## 2020-10-22 PROCEDURE — 83036 HEMOGLOBIN GLYCOSYLATED A1C: CPT

## 2020-10-22 PROCEDURE — 80061 LIPID PANEL: CPT

## 2020-10-22 PROCEDURE — 80053 COMPREHEN METABOLIC PANEL: CPT

## 2020-10-22 NOTE — PATIENT INSTRUCTIONS
Start testing blood glucose first thing in the morning and 2 hours after your biggest meal, usually dinner.    - Medication changes -   Restart Metformin: 1000 mg extended release 1 tab twice daily.   Then restart DPP-4 Inhibitor: Januvia 100 mg once daily    - Lifestyle changes -     · Focus on eating a Mediterranean-style diet, as seen above  · Exercise 30 minutes daily at least 5 days/week, as tolerated.        Jese Key, PharmD, MS, BCACP, LCC    [facility]  Phone [phone] fax [phone]    This note was created using voice recognition software (Dragon). The accuracy of the dictation is limited by the abilities of the software. I have reviewed the note prior to signing, however some errors in grammar and context are still possible. If you have any questions related to this note please do not hesitate to contact our office.

## 2020-10-22 NOTE — PROGRESS NOTES
Patient Consult Note    TIME IN: 9:51 AM  TIME OUT: 10:59 AM     Primary care physician: Priscilla Henriquez M.D.    Reason for consult: Management of Uncontrolled Type 2 Diabetes     Self-Efficacy for Diabetes Scale  I would like to know how confident you are in doing certain activities. For each of the following questions, please choose the number that corresponds to your confidence that can do the tasks regularly at the present time.     1. How confident do you feel that you can eat your meals every 4 to 5 hours every day, including breakfast every day?  Not Confident at all 1  2  3  4  5  6  7  8  9  10 Confident = 5     2. How confident do you feel that you can follow your diet when you have to prepare or share food with other people who do not have diabetes?  Not Confident at all 1  2  3  4  5  6  7  8  9  10 Confident = 6    3. How confident do you feel that you can choose the appropriate foods to eat when you are hungry (for example, snacks?)  Not Confident at all 1  2  3  4  5  6  7  8  9  10 Confident =6    4. How confident do you feel that you can exercise 15 to 30 minutes, 4 to 5 times a week?  Not Confident at all 1  2  3  4  5  6  7  8  9  10 Confident =3    5. How confident do you feel that you can do something to prevent your blood sugar level from dropping when you exercise?  Not Confident at all 1  2  3  4  5  6  7  8  9  10 Confident =5    6. How confident do you feel that you know what to do when your blood sugar level goes higher or lower that it should be?  Not Confident at all 1  2  3  4  5  6  7  8  9  10 Confident =5    7. How confident do you feel that you can  when the changes in your illness meal you should visit the doctor?  Not Confident at all 1  2  3  4  5  6  7  8  9  10 Confident =2    8. How confident do you feel that you can control your diabetes so that it does not interfere with the things you want to do?  Not Confident at all 1  2  3  4  5  6  7  8  9  10 Confident =2    This  scale is free to use without permission. [facility], [address], [address], [place] [address]. Funded by the [employer].       HPI:  Jerome Mendoza is a 64 y.o. old patient who comes in today for evaluation of above stated problem.    Most Recent HbA1c:   Lab Results   Component Value Date/Time    HBA1C 9.7 (H) 02/24/2020 08:45 AM      Lab Results   Component Value Date/Time    CREATININE 1.01 02/24/2020 08:45 AM        Currently not taking any medications due to possible drug allergy and rash.  He stopped all of his medications on 10/8/2020.    Diabetes Medication History  Metformin: 1000 mg extended release 1 tab twice daily  GLP-1 Agent:    SGLT-2 Inhibitor:     Sulfonylurea: Glipizide 10 mg twice daily  Thiazolidinedione:   DPP-4 Inhibitor: Januvia 100 mg once daily  Megltitinide:   Other:     Basal Insulin:   Prandial Insulin:   Mixed Insulin:     Past meds;  onglyza 5mg-he was switched for insurance reasons  actos 30mg-he cannot recall why he stopped this particular medication    Pt has home glucometer and proper testing technique -currently not using.  He will get one today.     Pt reports blood sugars:   Before Breakfast: eggs  Before Lunch: Burrito, trying to avoid breads  Before Dinner: Tacos.  Before Bedtime: Tries not to eat before bed.  Other times: Occasionally eating chips, he is a  so snacks as he drives.    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  Exercise Goal -30 minutes 5 times a week    Dietary-he does snack on chips when he drives.  Most of the time he eats Mexican food, including burritos and tacos for lunch and dinner.  Relatively low amounts of red meat and saturated fat.    Foot Exam:  Visual Inspection: Feet without maceration, ulcers,  fissures.  Feet dry.  Pedal pulses: intact bilaterally      As mentioned above, he is currently off all of his medications due to this rash on the 8th    Preventative Management  BP regimen (ACE/ARB) -lisinopril 10 mg daily    ASA -81 mg daily    Statin -Lipitor 10 mg once daily  Lab Results   Component Value Date/Time    CHOLSTRLTOT 218 (H) 02/24/2020 08:45 AM     (H) 02/24/2020 08:45 AM    HDL 37 (A) 02/24/2020 08:45 AM    TRIGLYCERIDE 141 02/24/2020 08:45 AM      Last Retinal Scan -sees a ophthalmologist.  He is up-to-date.    Last Foot Exam -regularly examines feet.  No problems to report.    Last A1c -2/24/2020, he is past due.  Lab Results   Component Value Date/Time    HBA1C 9.7 (H) 02/24/2020 08:45 AM      Last Microalbuminuria -      Ref. Range 2/24/2020 08:44   Micro Alb Creat Ratio Latest Ref Range: 0 - 30 mg/g 1435 (H)   Creatinine, Urine Latest Units: mg/dL 30.80   Microalbumin, Urine Random Latest Units: mg/dL 44.2     Results for ASUNCION LAURA (MRN [id number]) as of 10/22/2020 08:25   Ref. Range 10/10/2020 10:02   POC Color Latest Ref Range: Negative  YELLOW   POC Appearance Latest Ref Range: Negative  CLEAR   POC Specific Gravity Latest Ref Range: <1.005 - >1.030  1.015   POC Urine PH Latest Ref Range: 5.0 - 8.0  7.0   POC Glucose Latest Ref Range: Negative mg/dL 500   POC Ketones Latest Ref Range: Negative mg/dL NEG   POC Protein Latest Ref Range: Negative mg/dL 100   POC Nitrites Latest Ref Range: Negative  NEG   POC Leukocyte Esterase Latest Ref Range: Negative  NEG   POC Blood Latest Ref Range: Negative  TRACE   POC Bilirubin Latest Ref Range: Negative mg/dL NEG   POC Urobiligen Latest Ref Range: Negative (0.2) mg/dL 0.2       updated caregaps    Past Medical History:  Patient Active Problem List    Diagnosis Date Noted   • Hypertension associated with diabetes (HCC) 03/09/2020   • 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   • Retinopathy of right eye 08/29/2016   •  Hyperlipidemia 06/19/2014   • Asthma 06/19/2014   • Wears hearing aid 06/19/2014       Past Surgical History:  Past Surgical History:   Procedure Laterality Date   • TONSILLECTOMY AND ADENOIDECTOMY     • VASECTOMY         Allergies:  Penicillin g    Social History:  Social History     Socioeconomic History   • Marital status:      Spouse name: Not on file   • Number of children: Not on file   • Years of education: Not on file   • Highest education level: Not on file   Occupational History   • Not on file   Social Needs   • Financial resource strain: Not on file   • Food insecurity     Worry: Not on file     Inability: Not on file   • Transportation needs     Medical: Not on file     Non-medical: Not on file   Tobacco Use   • Smoking status: Never Smoker   • Smokeless tobacco: Never Used   • Tobacco comment: avoid all tobacco products   Substance and Sexual Activity   • Alcohol use: No     Alcohol/week: 0.0 oz   • Drug use: No   • Sexual activity: Yes     Partners: Female   Lifestyle   • Physical activity     Days per week: Not on file     Minutes per session: Not on file   • Stress: Not on file   Relationships   • Social connections     Talks on phone: Not on file     Gets together: Not on file     Attends Hinduism service: Not on file     Active member of club or organization: Not on file     Attends meetings of clubs or organizations: Not on file     Relationship status: Not on file   • Intimate partner violence     Fear of current or ex partner: Not on file     Emotionally abused: Not on file     Physically abused: Not on file     Forced sexual activity: Not on file   Other Topics Concern   • Not on file   Social History Narrative   • Not on file       Family History:  Family History   Problem Relation Age of Onset   • Asthma Mother    • Diabetes Father    • Psychiatric Illness Father         dementia   • Heart Disease Neg Hx    • Stroke Neg Hx    • Cancer Neg Hx        Medications:    Current Outpatient  Medications:   •  methylPREDNISolone (MEDROL DOSEPAK) 4 MG Tablet Therapy Pack, Follow schedule on package instructions., Disp: 21 Tab, Rfl: 0  •  glipiZIDE (GLUCOTROL) 10 MG Tab, Take 1 Tab by mouth 2 times a day. (Patient not taking: Reported on 10/10/2020), Disp: 180 Tab, Rfl: 1  •  lisinopril (PRINIVIL) 10 MG Tab, Take 1 Tab by mouth every day. (Patient not taking: Reported on 10/10/2020), Disp: 90 Tab, Rfl: 3  •  Mometasone Furoate (ASMANEX, 120 METERED DOSES, INH), Inhale 2 Puffs by mouth every day., Disp: , Rfl:   •  atorvastatin (LIPITOR) 10 MG Tab, Take 1 Tab by mouth every day. (Patient not taking: Reported on 10/10/2020), Disp: 90 Tab, Rfl: 3  •  SITagliptin (JANUVIA) 100 MG Tab, Take 1 Tab by mouth every day. (Patient not taking: Reported on 10/10/2020), Disp: 30 Tab, Rfl: 5  •  metformin ER modified (GLUMETZA) 1000 MG TABLET SR 24 HR, Take 1 Tab by mouth 2 Times a Day. (Patient not taking: Reported on 10/10/2020), Disp: 180 Tab, Rfl: 3  •  albuterol (VENTOLIN HFA) 108 (90 Base) MCG/ACT Aero Soln inhalation aerosol, INHALE TWO PUFFS BY MOUTH EVERY 6 HOURS AS NEEDED FOR SHORTNESS OF BREATH (Patient not taking: Reported on 10/10/2020), Disp: 3 Inhaler, Rfl: 3  •  triamcinolone (TRIESENCE) 40 MG/ML Suspension, by Intramuscular route., Disp: , Rfl:   •  mometasone (ASMANEX) 220 MCG/INH inhaler, USE 2 INHALATIONS ONCE DAILY (Patient not taking: Reported on 10/10/2020), Disp: 1 Inhaler, Rfl: 0  •  aspirin EC (ECOTRIN) 81 MG Tablet Delayed Response, Take 1 Tab by mouth every day. (Patient not taking: Reported on 10/10/2020), Disp: 90 Tab, Rfl: 0  •  Cholecalciferol (VITAMIN D3) 2000 UNIT Cap, Take  by mouth., Disp: , Rfl:     Labs: Reviewed    Physical Examination:  Vital signs: There were no vitals taken for this visit. There is no height or weight on file to calculate BMI.    Assessment and Plan:    His diabetes is poorly controlled.  He recently stopped all of his medications due to a rash.  The priority  today is getting him to test his blood sugars, get current labs, and get him to start taking at least one medication at a time.  We will start with Metformin 1000 mg once daily and then progressed to 2 tablets daily.  If he tolerates this for approximately 3 to 5 days we will start Januvia 100 mg once daily.  I will see him in 1 week to review his home monitor results and to review the labs that were ordered today.  I am ordering a CMP, lipid panel, A1c, and microalbumin to creatinine ratio.  Obviously, how we will need to start/restart all of his other medications including aspirin, lisinopril, and everything else noted above.  He does have a primary care appointment within the next 2 weeks.  Hopefully none of these medications cause his rash to flare up.  He also might benefit from a dermatology or allergist referral if the rash does not subside.  None of the medications noted above were new prior to him developing a rash.  Overall, he does report the rash has improved since stopping all of his medications and starting a steroid pack.  Eventually, I would like to get him on a SGLT2 inhibitor, for the cardiovascular benefits, as well as possibly a GLP-1.  I will also eventually decrease his sulfonylurea and/or eliminate this from his regiment.         1. DM2    - Medication changes -   · Restart Metformin: 1000 mg extended release 1 tab twice daily.   · Then restart DPP-4 Inhibitor: Januvia 100 mg once daily  · Start testing blood glucose first thing in the morning and 2 hours after your biggest meal, usually dinner.    - Lifestyle changes -     · Focus on eating a Mediterranean-style diet, as seen above  · Exercise 30 minutes daily at least 5 days/week, as tolerated.    Jese Key, PharmD  10/22/20    CC:   Priscilla Henriquez M.D.

## 2020-10-23 ENCOUNTER — ANTICOAGULATION MONITORING (OUTPATIENT)
Dept: VASCULAR LAB | Facility: MEDICAL CENTER | Age: 65
End: 2020-10-23

## 2020-10-23 LAB
CREAT UR-MCNC: 29.89 MG/DL
EST. AVERAGE GLUCOSE BLD GHB EST-MCNC: 292 MG/DL
HBA1C MFR BLD: 11.8 % (ref 0–5.6)
MICROALBUMIN UR-MCNC: 61.1 MG/DL
MICROALBUMIN/CREAT UR: 2044 MG/G (ref 0–30)

## 2020-10-23 NOTE — PROGRESS NOTES
Jese Key, PharmD  Alvin Esquivel M.D.; Michael J Bloch, M.D.; Ulices Anderson M.D.; Stan Morales, LitoD; Jese Key, PharmD             This was a new diabetic patient for me yesterday.  He recently stopped all of his medications due to a rash.  I had him immediately get labs.       He has hyponatremia with a sodium of 130, blood glucose near 500, and severe microalbuminuria.  I am calculating a serum osmolarity of about 294 mOsm, which is within normal limits...  He has been drinking a lot of water lately and I would suspect he has possibly, a hypervolemic hyponatremia that is volume induced, but not positive.     At our appointment yesterday, I recommended that he start Metformin.  I called him today and reported his lab values.  He does not report any cognitive deficits.  So far, no problems with Metformin.  I advised him to seek medical attention for any confusion or signs and symptoms of diabetic ketoacidosis.  I also advised him to drink normal amounts of water and normal salt intake.     Please advise if I should get any other labs or if this patient needs more immediate medical attention.       Jese Key, PharmD, MS, BCACP, [facility]   Phone [phone] fax [phone]     This note was created using voice recognition software (Dragon). The accuracy of the dictation is limited by the abilities of the software. I have reviewed the note prior to signing, however some errors in grammar and context are still possible. If you have any questions related to this note please do not hesitate to contact our office.      Results for ASUNCION LAURA (MRN [id number]) as of 10/23/2020 09:42   Ref. Range 10/22/2020 11:35   Sodium Latest Ref Range: 135 - 145 mmol/L 130 (L)   Potassium Latest Ref Range: 3.6 - 5.5 mmol/L 5.0   Chloride Latest Ref Range: 96 - 112 mmol/L 96   Co2 Latest Ref Range: 20 - 33 mmol/L 25   Anion Gap Latest Ref Range: 7.0 -  16.0  9.0   Glucose Latest Ref Range: 65 - 99 mg/dL 483 (H)   Bun Latest Ref Range: 8 - 22 mg/dL 21   Creatinine Latest Ref Range: 0.50 - 1.40 mg/dL 1.04   GFR If  Latest Ref Range: >60 mL/min/1.73 m 2 >60   GFR If Non  Latest Ref Range: >60 mL/min/1.73 m 2 >60   Calcium Latest Ref Range: 8.5 - 10.5 mg/dL 9.3   AST(SGOT) Latest Ref Range: 12 - 45 U/L 13   ALT(SGPT) Latest Ref Range: 2 - 50 U/L 19   Alkaline Phosphatase Latest Ref Range: 30 - 99 U/L 73   Total Bilirubin Latest Ref Range: 0.1 - 1.5 mg/dL 0.3   Albumin Latest Ref Range: 3.2 - 4.9 g/dL 3.7   Total Protein Latest Ref Range: 6.0 - 8.2 g/dL 6.7   Globulin Latest Ref Range: 1.9 - 3.5 g/dL 3.0   A-G Ratio Latest Units: g/dL 1.2   Glycohemoglobin Latest Ref Range: 0.0 - 5.6 % 11.8 (H)   Estim. Avg Glu Latest Units: mg/dL 292   Cholesterol,Tot Latest Ref Range: 100 - 199 mg/dL 242 (H)   Triglycerides Latest Ref Range: 0 - 149 mg/dL 218 (H)   HDL Latest Ref Range: >=40 mg/dL 31 (A)   LDL Latest Ref Range: <100 mg/dL 167 (H)   Micro Alb Creat Ratio Latest Ref Range: 0 - 30 mg/g 2044 (H)   Creatinine, Urine Latest Units: mg/dL 29.89   Microalbumin, Urine Random Latest Units: mg/dL 61.1

## 2020-10-29 ENCOUNTER — NON-PROVIDER VISIT (OUTPATIENT)
Dept: MEDICAL GROUP | Facility: PHYSICIAN GROUP | Age: 65
End: 2020-10-29
Payer: COMMERCIAL

## 2020-10-29 PROCEDURE — 99214 OFFICE O/P EST MOD 30 MIN: CPT | Performed by: INTERNAL MEDICINE

## 2020-10-29 RX ORDER — EMPAGLIFLOZIN 10 MG/1
10 TABLET, FILM COATED ORAL DAILY
Qty: 30 TAB | Refills: 0 | Status: SHIPPED | OUTPATIENT
Start: 2020-10-29 | End: 2020-11-02

## 2020-10-29 RX ORDER — DULAGLUTIDE 0.75 MG/.5ML
0.5 INJECTION, SOLUTION SUBCUTANEOUS
Qty: 1.96 ML | Refills: 6 | Status: SHIPPED | OUTPATIENT
Start: 2020-10-29 | End: 2020-11-17

## 2020-10-29 NOTE — PROGRESS NOTES
Patient Consult Note    TIME IN: 1:34 PM  TIME OUT: 2:34 PM      Primary care physician: Alvin Esquivel M.D.      Reason for consult: Management of Uncontrolled Type 2 Diabetes     HPI:  Jerome Mendoza is a 64 y.o. old patient who comes in today for evaluation of above stated problem.    Most Recent HbA1c:   Lab Results   Component Value Date/Time    HBA1C 11.8 (H) 10/22/2020 11:35 AM      Lab Results   Component Value Date/Time    CREATININE 1.04 10/22/2020 11:35 AM          Diabetes Medication History  Metformin: 1000 mg extended release 1 tab twice daily  GLP-1 Agent:    SGLT-2 Inhibitor:     Sulfonylurea:   Thiazolidinedione:   DPP-4 Inhibitor: Januvia 100 mg once daily  Megltitinide:   Other:     Basal Insulin:   Prandial Insulin:   Mixed Insulin:     Past meds;  onglyza 5mg-he was switched for insurance reasons  actos 30mg-he cannot recall why he stopped this particular medication    Pt has home glucometer and proper testing technique -currently not using.  He will get one today.     Pt reports blood sugars:   Before    Before Lunch:   Before Dinner:-Blood sugar was 568 in clinic today.  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  Exercise Goal -30 minutes 5 times a week    Dietary-he does snack on chips when he drives.  Most of the time he eats Mexican food, including burritos and tacos for lunch and dinner.  Relatively low amounts of red meat and saturated fat.    Foot Exam:  Visual Inspection: Feet without maceration, ulcers, fissures.  Feet dry.  Pedal pulses: intact bilaterally    Preventative Management  BP regimen (ACE/ARB) -lisinopril 10 mg daily  ASA -81 mg daily  Statin -Lipitor 10 mg once daily  Lab Results   Component Value Date/Time    CHOLSTRLTOT 242 (H) 10/22/2020 11:35 AM     (H)  10/22/2020 11:35 AM    HDL 31 (A) 10/22/2020 11:35 AM    TRIGLYCERIDE 218 (H) 10/22/2020 11:35 AM   Last Retinal Scan -sees a ophthalmologist.  He is up-to-date.  Last Foot Exam -regularly examines feet.  No problems to report.  Last A1c -2/24/2020, he is past due.  Lab Results   Component Value Date/Time    HBA1C 11.8 (H) 10/22/2020 11:35 AM   Last Microalbuminuria -      Ref. 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   • Retinopathy of right eye 08/29/2016   • Hyperlipidemia 06/19/2014   • Asthma 06/19/2014   • Wears hearing aid 06/19/2014       Past Surgical History:  Past Surgical History:   Procedure Laterality Date   • TONSILLECTOMY AND ADENOIDECTOMY     • VASECTOMY         Allergies:  Penicillin g    Social History:  Social History     Socioeconomic History   • Marital status:      Spouse name: Not on file   • Number of children: Not on file   • Years of education: Not on file   • Highest education level: Not on file   Occupational History   • Not on file   Social Needs   • Financial resource strain: Not on file   • Food insecurity     Worry: Not on file     Inability: Not on file   • Transportation needs     Medical: Not on file     Non-medical: Not on file   Tobacco Use   • Smoking status: Never Smoker   • Smokeless tobacco: Never Used   • Tobacco comment: avoid all tobacco products   Substance and Sexual Activity   • Alcohol use: No     Alcohol/week: 0.0 oz   • Drug use: No   • Sexual activity: Yes     Partners: Female   Lifestyle   • Physical activity     Days per week: Not on file     Minutes per session: Not on file   • Stress: Not on  file   Relationships   • Social connections     Talks on phone: Not on file     Gets together: Not on file     Attends Restorationist service: Not on file     Active member of club or organization: Not on file     Attends meetings of clubs or organizations: Not on file     Relationship status: Not on file   • Intimate partner violence     Fear of current or ex partner: Not on file     Emotionally abused: Not on file     Physically abused: Not on file     Forced sexual activity: Not on file   Other Topics Concern   • Not on file   Social History Narrative   • Not on file       Family History:  Family History   Problem Relation Age of Onset   • Asthma Mother    • Diabetes Father    • Psychiatric Illness Father         dementia   • Heart Disease Neg Hx    • Stroke Neg Hx    • Cancer Neg Hx        Medications:    Current Outpatient Medications:   •  Empagliflozin (JARDIANCE) 10 MG Tab, Take 10 mg by mouth every day., Disp: 30 Tab, Rfl: 0  •  Dulaglutide (TRULICITY) 0.75 MG/0.5ML Solution Pen-injector, Inject 0.5 mL as instructed every 7 days., Disp: 1.96 mL, Rfl: 6  •  SITagliptin (JANUVIA) 100 MG Tab, Take 1 Tab by mouth every day., Disp: 30 Tab, Rfl: 5  •  metformin ER modified (GLUMETZA) 1000 MG TABLET SR 24 HR, Take 1 Tab by mouth 2 Times a Day., Disp: 180 Tab, Rfl: 3  •  lisinopril (PRINIVIL) 10 MG Tab, Take 1 Tab by mouth every day. (Patient not taking: Reported on 10/10/2020), Disp: 90 Tab, Rfl: 3  •  Mometasone Furoate (ASMANEX, 120 METERED DOSES, INH), Inhale 2 Puffs by mouth every day., Disp: , Rfl:   •  atorvastatin (LIPITOR) 10 MG Tab, Take 1 Tab by mouth every day. (Patient not taking: Reported on 10/10/2020), Disp: 90 Tab, Rfl: 3  •  albuterol (VENTOLIN HFA) 108 (90 Base) MCG/ACT Aero Soln inhalation aerosol, INHALE TWO PUFFS BY MOUTH EVERY 6 HOURS AS NEEDED FOR SHORTNESS OF BREATH (Patient not taking: Reported on 10/10/2020), Disp: 3 Inhaler, Rfl: 3  •  triamcinolone (TRIESENCE) 40 MG/ML Suspension, by  Intramuscular route., Disp: , Rfl:   •  mometasone (ASMANEX) 220 MCG/INH inhaler, USE 2 INHALATIONS ONCE DAILY (Patient not taking: Reported on 10/10/2020), Disp: 1 Inhaler, Rfl: 0  •  aspirin EC (ECOTRIN) 81 MG Tablet Delayed Response, Take 1 Tab by mouth every day. (Patient not taking: Reported on 10/10/2020), Disp: 90 Tab, Rfl: 0  •  Cholecalciferol (VITAMIN D3) 2000 UNIT Cap, Take  by mouth., Disp: , Rfl:     Labs: Reviewed    Physical Examination:  Vital signs: There were no vitals taken for this visit. There is no height or weight on file to calculate BMI.    Assessment and Plan:  One of the main obstacles has been his recent rash.  He attributes this to possibly a over-the-counter supplement or his medications.  Thus far, he has not had any rash after restarting Metformin and Januvia.  I will discontinue his sulfonylurea (he has not restarted it yet) and initiate a GLP-1 and SGLT2, at low doses and titrate as tolerated.  He will also slowly initiate his other medications and monitor for signs and symptoms of rash or allergic reactions.  He was counseled on both new medications.    1. DM2    - Medication changes -   Continue-Metformin: 1000 mg extended release 1 tab twice daily  Start- GLP-1 Agent:   Trulicity 0.75 mg weekly (with the intention of increasing the dose as tolerated)  Start-SGLT-2 Inhibitor:   Jardiance 10 mg once daily (with the intention of increasing the dose after 4 weeks if tolerated)  [id number]-[id number] Inhibitor: Januvia 100 mg once daily (eventually we will discontinue this medication once blood sugars start to decrease).    · Start testing blood glucose first thing in the morning and 2 hours after your biggest meal, usually dinner.    - Lifestyle changes -     · Focus on eating a Mediterranean-style diet, as seen above  · Exercise 30 minutes daily at least 5 days/week, as tolerated.    Jese Key, PharmD  10/22/20    CC:   Priscilla Henriquez M.D.

## 2020-11-02 ENCOUNTER — OFFICE VISIT (OUTPATIENT)
Dept: MEDICAL GROUP | Facility: PHYSICIAN GROUP | Age: 65
End: 2020-11-02
Payer: COMMERCIAL

## 2020-11-02 VITALS
WEIGHT: 154 LBS | DIASTOLIC BLOOD PRESSURE: 80 MMHG | OXYGEN SATURATION: 98 % | TEMPERATURE: 98.5 F | HEART RATE: 74 BPM | SYSTOLIC BLOOD PRESSURE: 132 MMHG | HEIGHT: 65 IN | BODY MASS INDEX: 25.66 KG/M2

## 2020-11-02 DIAGNOSIS — I15.2 HYPERTENSION ASSOCIATED WITH DIABETES (HCC): ICD-10-CM

## 2020-11-02 DIAGNOSIS — E78.2 MIXED HYPERLIPIDEMIA: ICD-10-CM

## 2020-11-02 DIAGNOSIS — R80.9 TYPE 2 DIABETES MELLITUS WITH ALBUMINURIA (HCC): ICD-10-CM

## 2020-11-02 DIAGNOSIS — E11.59 HYPERTENSION ASSOCIATED WITH DIABETES (HCC): ICD-10-CM

## 2020-11-02 DIAGNOSIS — R25.2 MUSCLE CRAMPS: ICD-10-CM

## 2020-11-02 DIAGNOSIS — R80.9 MICROALBUMINURIA: ICD-10-CM

## 2020-11-02 DIAGNOSIS — E11.29 TYPE 2 DIABETES MELLITUS WITH ALBUMINURIA (HCC): ICD-10-CM

## 2020-11-02 PROCEDURE — 99214 OFFICE O/P EST MOD 30 MIN: CPT | Performed by: INTERNAL MEDICINE

## 2020-11-02 NOTE — PROGRESS NOTES
New Patient to establish    Chief Complaint   Patient presents with   • Diabetes   • Establish Care       Subjective:     History of Present Illness: Patient is a 65 y.o. male who is here today to establish primary care    1. Muscle cramps  2. 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 Mixed hyperlipidemia  A1c:   Lab Results   Component Value Date/Time    HBA1C 11.8 (H) 10/22/2020 1135    HBA1C 9.7 (H) 02/24/2020 0845    HBA1C 9.2 (H) 08/12/2019 0804    AVGLUC 292 10/22/2020 1135    AVGLUC 232 02/24/2020 0845    AVGLUC 217 08/12/2019 0804     LIPID:  Lab Results   Component Value Date/Time    CHOLSTRLTOT 242 (H) 10/22/2020 11:35 AM    CHOLSTRLTOT 218 (H) 02/24/2020 08:45 AM     (H) 10/22/2020 11:35 AM     (H) 02/24/2020 08:45 AM    HDL 31 (A) 10/22/2020 11:35 AM    HDL 37 (A) 02/24/2020 08:45 AM    TRIGLYCERIDE 218 (H) 10/22/2020 11:35 AM    TRIGLYCERIDE 141 02/24/2020 08:45 AM     History of diabetes, chronic, unstable, mention he was stopped taking his medication for a while, he developed some rash which responded well to prednisone as well  -Patient has access to a lot of unhealthy sugar and carbs, does not check blood glucose at home, he was seen by our pharmacist Dr. Sawant and started on Trulicity 0.75 mg injection weekly, Metformin extended release 1000 twice daily, Januvia 100 mg daily, as well as Jardiance 10 mg daily, patient had been started Trulicity yet but he has available at home  -Patient also had hyponatremia from drinking a lot of water as well as hyperglycemia related, also has some muscle cramps of the legs especially at night  >> Mention he stopped taking statin for 1 year or even more secondary to muscle cramps and myalgia      3. Hypertension associated with diabetes (HCC)  Chronic, stable, stop taking his medication all of them including lisinopril 10 mg for a while, currently denied having chest pain, shortness of breath, presyncopal symptoms          Current  Outpatient Medications on File Prior to Visit   Medication Sig Dispense Refill   • Dulaglutide (TRULICITY) 0.75 MG/0.5ML Solution Pen-injector Inject 0.5 mL as instructed every 7 days. 1.96 mL 6   • Mometasone Furoate (ASMANEX, 120 METERED DOSES, INH) Inhale 2 Puffs by mouth every day.     • metformin ER modified (GLUMETZA) 1000 MG TABLET SR 24 HR Take 1 Tab by mouth 2 Times a Day. 180 Tab 3   • triamcinolone (TRIESENCE) 40 MG/ML Suspension by Intramuscular route.     • Cholecalciferol (VITAMIN D3) 2000 UNIT Cap Take  by mouth.     • lisinopril (PRINIVIL) 10 MG Tab Take 1 Tab by mouth every day. (Patient not taking: Reported on 10/10/2020) 90 Tab 3   • albuterol (VENTOLIN HFA) 108 (90 Base) MCG/ACT Aero Soln inhalation aerosol INHALE TWO PUFFS BY MOUTH EVERY 6 HOURS AS NEEDED FOR SHORTNESS OF BREATH (Patient not taking: Reported on 10/10/2020) 3 Inhaler 3     No current facility-administered medications on file prior to visit.      Allergies   Allergen Reactions   • Penicillin G Swelling     Patient Active Problem List    Diagnosis Date Noted   • Muscle cramps 11/02/2020   • Hypertension associated with diabetes ([facility]) 03/09/2020   • 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[facility]) 10/20/2016   • Retinopathy of right eye 08/29/2016   • Hyperlipidemia 06/19/2014   • Asthma 06/19/2014   • Wears hearing aid 06/19/2014     Past Medical History:   Diagnosis Date   • Asthma 6/19/2014   • Hearing loss of both ears 6/19/2014   • Hyperlipidemia 6/19/2014   • Type II or unspecified type diabetes mellitus without mention of complication, not stated as uncontrolled 6/19/2014   • Wears hearing aid 6/19/2014     Past Surgical History:   Procedure Laterality Date   • TONSILLECTOMY AND ADENOIDECTOMY     • VASECTOMY       Family History   Problem Relation Age of Onset   • Asthma Mother    • Diabetes Father    • Psychiatric Illness Father         dementia   • Heart Disease Neg Hx    • Stroke Neg Hx    • Cancer Neg Hx      Social  "History     Tobacco Use   • Smoking status: Never Smoker   • Smokeless tobacco: Never Used   • Tobacco comment: avoid all tobacco products   Substance Use Topics   • Alcohol use: No     Alcohol/week: 0.0 oz   • Drug use: No       ROS:     - Constitutional: Negative for fever, chills,    - Eye: Negative for blurry vision    -ENT: Negative for ear pain    - Respiratory: Negative for cough, hemoptysis    - Cardiovascular: Negative for chest pain     - Gastrointestinal: Negative for abdominal pain    - Genitourinary: Negative for dysuria    - Musculoskeletal: Negative for joint swelling    - Skin: Negative for itching    - Neurological: Negative for focal weakness     - Psychiatric/Behavioral: Negative for depression        Physical Exam:     /80 (BP Location: Right arm, Patient Position: Sitting, BP Cuff Size: Large adult)   Pulse 74   Temp 36.9 °C (98.5 °F) (Temporal)   Ht 1.651 m (5' 5\")   Wt 69.9 kg (154 lb)   SpO2 98%   BMI 25.63 kg/m²   General: Normal appearing. No distress.  ENT: oropharynx without exudates.    Eyes: conjunctiva clear lids without ptosis  Pulmonary: Clear to ausculation.  Normal effort.   Cardiovascular: Regular rate and rhythm  Abdomen: Soft, nontender,  Lymph: No cervical or supraclavicular palpable lymph nodes  Psych: Normal mood and affect.     I have reviewed pertinent labs and diagnostic tests associated with this visit with specific comments listed under the assessment and plan below      Assessment and Plan:     1. Muscle cramps  2. 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 Mixed hyperlipidemia  - Magnesium 400 MG Cap; Take 1 Each by mouth every day.  Dispense: 30 Cap; Refill: 2  - Empagliflozin 25 MG Tab; Take 1 Each by mouth every day.  Dispense: 60 Tab; Refill: 1  >> Continue metformin 1000 twice daily, increase Jardiance from 10 mg to 20 mg daily for now, continue Trulicity 0.75 mg weekly, stop taking Januvia secondary to interaction with Trulicity  >> Patient " again refused taking statin or Zetia secondary to side effect, mention he is taking high-dose fish oil  -Discussed diabetic diet in detail, educated regarding management of hypoglycemia, advised regular exercise, educated disease management and weight goals as well as feet care and frequent check of feet.  -Patient to check early morning fasting blood glucose with goal discussed.  -Discussed side effects of medication. Patient confirmed understanding of information.      3. Hypertension associated with diabetes (HCC)  Continue lisinopril 10 mg daily especially in the light of diabetes and microalbuminuria        Follow Up:      Return in about 2 weeks (around 11/16/2020) for follow up, diabetes.    Please note that this dictation was created using voice recognition software. I have made every reasonable attempt to correct obvious errors, but I expect that there are errors of grammar and possibly content that I did not discover before finalizing the note.    Signed by: Alvin Esquivel M.D.

## 2020-11-12 ENCOUNTER — NON-PROVIDER VISIT (OUTPATIENT)
Dept: MEDICAL GROUP | Facility: PHYSICIAN GROUP | Age: 65
End: 2020-11-12
Payer: COMMERCIAL

## 2020-11-12 DIAGNOSIS — E11.65 TYPE 2 DIABETES MELLITUS WITH HYPERGLYCEMIA, WITHOUT LONG-TERM CURRENT USE OF INSULIN (HCC): Primary | ICD-10-CM

## 2020-11-12 PROCEDURE — 99402 PREV MED CNSL INDIV APPRX 30: CPT | Performed by: INTERNAL MEDICINE

## 2020-11-12 RX ORDER — ATORVASTATIN CALCIUM 10 MG/1
10 TABLET, FILM COATED ORAL NIGHTLY
COMMUNITY
End: 2021-01-20 | Stop reason: SDUPTHER

## 2020-11-12 NOTE — NON-PROVIDER
Patient Consult Note    TIME IN: 0900  TIME OUT: 0930    Primary care physician: Alvin Esquivel M.D.    Reason for consult: Management of Uncontrolled Type 2 Diabetes    HPI:  Jerome Mendoza is a 65 y.o. old patient who comes in today for evaluation of above stated problem.    Pt's FBG has been improving, but not at goal. Jardiance was increased during his visit with his PCP and he has been tolerating this well. He denies any s/s of a UTI, but expresses that he's been urinating frequently. Advised him to stay hydrated to prevent getting dehydrated and maintain personal hygiene to prevent UTI.    Pt's Januvia was d/c'd by PCP as well.    Pt expresses he has been feeling nauseous on Trulicity 0.75 mg q week but he admits to overeating. Advised pt to avoid overeating to prevent ADRs. Pt verbalized understanding.     Most Recent HbA1c:   Lab Results   Component Value Date/Time    HBA1C 11.8 (H) 10/22/2020 11:35 AM      Lab Results   Component Value Date/Time    CREATININE 1.04 10/22/2020 11:35 AM              Diabetes Medication History and Current Regimen  Metformin: metformin ER 1000 bid  GLP-1 Agent: Dulaglutide 0.75 mg once weekly   SGLT-2 Inhibitor:  Empagliflozin 25 mg once daily     Pt has home glucometer and proper testing technique - yes    Pt reports blood sugars:   Before Breakfast: 180-200s  Hypoglycemia:  None    Current Exercise - limited d/t his occupation as a    Exercise Goal - take breaks during drives and walk around    Dietary - pt got a juicer to help him incorporate more vegetables; he is planning to retire, so that he can focus more on improving his health    Foot Exam:  Monofilament exam   Monofilament testing with a 10 gram force: sensation intact: decreased bilaterally.    Visual Inspection: Feet without maceration, ulcers, fissures.  Feet dry.  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   • Retinopathy of right eye 08/29/2016   • Microalbuminuria 09/28/2015   • Hyperlipidemia 06/19/2014   • Asthma 06/19/2014   • Wears hearing aid 06/19/2014       Past Surgical History:  Past Surgical History:   Procedure Laterality Date   • TONSILLECTOMY AND ADENOIDECTOMY     • VASECTOMY         Allergies:  Penicillin g    Social History:  Social History     Socioeconomic History   • Marital status:      Spouse name: Not on file   • Number of children: Not on file   • Years of education: Not on file   • Highest education level: Not on file   Occupational History   • Not on file   Social Needs   • Financial resource strain: Not on file   • Food insecurity     Worry: Not on file     Inability: Not on file   • Transportation needs     Medical: Not on file     Non-medical: Not on file   Tobacco Use   • Smoking status: Never Smoker   • Smokeless tobacco: Never Used   • Tobacco comment: avoid all tobacco products   Substance and Sexual Activity   • Alcohol use: No     Alcohol/week: 0.0 oz   • Drug use: No   • Sexual activity: Yes     Partners: Female   Lifestyle   • Physical activity     Days per week: Not on file     Minutes per session: Not on file   • Stress: Not on file   Relationships   • Social connections     Talks on phone: Not on file     Gets together: Not on file     Attends Confucianist service: Not on file     Active member of club or organization: Not on file     Attends meetings of clubs or organizations: Not on file     Relationship status: Not on  file   • Intimate partner violence     Fear of current or ex partner: Not on file     Emotionally abused: Not on file     Physically abused: Not on file     Forced sexual activity: Not on file   Other Topics Concern   • Not on file   Social History Narrative   • Not on file       Family History:  Family History   Problem Relation Age of Onset   • Asthma Mother    • Diabetes Father    • Psychiatric Illness Father         dementia   • Heart Disease Neg Hx    • Stroke Neg Hx    • Cancer Neg Hx        Medications:    Current Outpatient Medications:   •  atorvastatin (LIPITOR) 10 MG Tab, Take 10 mg by mouth every evening., Disp: , Rfl:   •  Empagliflozin 25 MG Tab, Take 1 Each by mouth every day., Disp: 60 Tab, Rfl: 1  •  lisinopril (PRINIVIL) 10 MG Tab, Take 1 Tab by mouth every day., Disp: 90 Tab, Rfl: 3  •  metformin ER modified (GLUMETZA) 1000 MG TABLET SR 24 HR, Take 1 Tab by mouth 2 Times a Day., Disp: 180 Tab, Rfl: 3  •  Dulaglutide (TRULICITY) 0.75 MG/0.5ML Solution Pen-injector, Inject 0.5 mL as instructed every 7 days., Disp: 1.96 mL, Rfl: 6  •  Mometasone Furoate (ASMANEX, 120 METERED DOSES, INH), Inhale 2 Puffs by mouth every day., Disp: , Rfl:   •  albuterol (VENTOLIN HFA) 108 (90 Base) MCG/ACT Aero Soln inhalation aerosol, INHALE TWO PUFFS BY MOUTH EVERY 6 HOURS AS NEEDED FOR SHORTNESS OF BREATH (Patient not taking: Reported on 10/10/2020), Disp: 3 Inhaler, Rfl: 3  •  triamcinolone (TRIESENCE) 40 MG/ML Suspension, by Intramuscular route., Disp: , Rfl:   •  Cholecalciferol (VITAMIN D3) 2000 UNIT Cap, Take  by mouth., Disp: , Rfl:     Labs: Reviewed    Assessment and Plan:    1. DM2  Continue metformin ER 1000 mg bid  Continue Jardiance 25 mg daily  Continue Trulicity 0.75 mg q 7 days - consider increasing dose once pt is used to this medication; pt to avoid overeating to avoid n/v    Return in about 4 weeks (around 12/10/2020). with pharmacy  2 weeks with PCP    Lito HatchD  11/12/20    CC:    Alivn Esquivel M.D.

## 2020-11-17 ENCOUNTER — HOSPITAL ENCOUNTER (OUTPATIENT)
Dept: LAB | Facility: MEDICAL CENTER | Age: 65
End: 2020-11-17
Attending: INTERNAL MEDICINE
Payer: COMMERCIAL

## 2020-11-17 ENCOUNTER — OFFICE VISIT (OUTPATIENT)
Dept: MEDICAL GROUP | Facility: PHYSICIAN GROUP | Age: 65
End: 2020-11-17
Payer: COMMERCIAL

## 2020-11-17 VITALS
TEMPERATURE: 99.9 F | HEIGHT: 65 IN | WEIGHT: 148.4 LBS | BODY MASS INDEX: 24.72 KG/M2 | DIASTOLIC BLOOD PRESSURE: 64 MMHG | OXYGEN SATURATION: 97 % | SYSTOLIC BLOOD PRESSURE: 124 MMHG | HEART RATE: 85 BPM

## 2020-11-17 DIAGNOSIS — R39.9 LOWER URINARY TRACT SYMPTOMS (LUTS): ICD-10-CM

## 2020-11-17 DIAGNOSIS — E11.29 TYPE 2 DIABETES MELLITUS WITH ALBUMINURIA (HCC): ICD-10-CM

## 2020-11-17 DIAGNOSIS — R80.9 TYPE 2 DIABETES MELLITUS WITH ALBUMINURIA (HCC): ICD-10-CM

## 2020-11-17 LAB
APPEARANCE UR: CLEAR
BACTERIA #/AREA URNS HPF: NEGATIVE /HPF
BILIRUB UR QL STRIP.AUTO: NEGATIVE
COLOR UR: YELLOW
EPI CELLS #/AREA URNS HPF: NEGATIVE /HPF
GLUCOSE UR STRIP.AUTO-MCNC: >=1000 MG/DL
HYALINE CASTS #/AREA URNS LPF: ABNORMAL /LPF
KETONES UR STRIP.AUTO-MCNC: 15 MG/DL
LEUKOCYTE ESTERASE UR QL STRIP.AUTO: NEGATIVE
MICRO URNS: ABNORMAL
NITRITE UR QL STRIP.AUTO: NEGATIVE
PH UR STRIP.AUTO: 5.5 [PH] (ref 5–8)
PROT UR QL STRIP: 100 MG/DL
RBC # URNS HPF: ABNORMAL /HPF
RBC UR QL AUTO: ABNORMAL
SP GR UR STRIP.AUTO: 1.03
UROBILINOGEN UR STRIP.AUTO-MCNC: 0.2 MG/DL
WBC #/AREA URNS HPF: ABNORMAL /HPF

## 2020-11-17 PROCEDURE — 99214 OFFICE O/P EST MOD 30 MIN: CPT | Performed by: INTERNAL MEDICINE

## 2020-11-17 PROCEDURE — 81001 URINALYSIS AUTO W/SCOPE: CPT

## 2020-11-17 RX ORDER — MAGNESIUM OXIDE 400 MG/1
400 TABLET ORAL
COMMUNITY
Start: 2020-11-03 | End: 2021-03-15 | Stop reason: SDUPTHER

## 2020-11-17 RX ORDER — TAMSULOSIN HYDROCHLORIDE 0.4 MG/1
0.4 CAPSULE ORAL
Qty: 30 CAP | Refills: 3 | Status: SHIPPED | OUTPATIENT
Start: 2020-11-17 | End: 2021-01-12 | Stop reason: SDUPTHER

## 2020-11-17 RX ORDER — DORZOLAMIDE HYDROCHLORIDE AND TIMOLOL MALEATE 20; 5 MG/ML; MG/ML
SOLUTION/ DROPS OPHTHALMIC
COMMUNITY
Start: 2020-10-15 | End: 2021-10-12

## 2020-11-17 NOTE — PROGRESS NOTES
Established Patient    Chief Complaint   Patient presents with   • Follow-Up       Subjective:     HPI:   Jerome presents today with the following.    1. Lower urinary tract symptoms (LUTS)  -Patient has a lot of lower urinary tract symptoms, urine frequency, possibly some dysuria, denied having fever, abdominal pain, leg pain, nausea, vomiting, other symptoms    2. 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)  -Patient check blood glucose before breakfast and is improving, patient is compliant with Trulicity 0.75 mg daily, metformin 1000 twice daily, Jardiance 25 mg daily, patient try to eat healthier options    Patient Active Problem List    Diagnosis Date Noted   • Lower urinary tract symptoms (LUTS) 11/17/2020   • Muscle cramps 11/02/2020   • Hypertension associated with diabetes (HCC) 03/09/2020   • 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[facility]) 10/20/2016   • Retinopathy of right eye 08/29/2016   • Microalbuminuria 09/28/2015   • Hyperlipidemia 06/19/2014   • Asthma 06/19/2014   • Wears hearing aid 06/19/2014       Current Outpatient Medications on File Prior to Visit   Medication Sig Dispense Refill   • dorzolamide-timolol (COSOPT) 22.3-6.8 MG/ML Solution INSTILL 1 DROP INTO EACH EYE TWICE DAILY     • atorvastatin (LIPITOR) 10 MG Tab Take 10 mg by mouth every evening.     • Empagliflozin 25 MG Tab Take 1 Each by mouth every day. 60 Tab 1   • lisinopril (PRINIVIL) 10 MG Tab Take 1 Tab by mouth every day. 90 Tab 3   • metformin ER modified (GLUMETZA) 1000 MG TABLET SR 24 HR Take 1 Tab by mouth 2 Times a Day. 180 Tab 3   • albuterol (VENTOLIN HFA) 108 (90 Base) MCG/ACT Aero Soln inhalation aerosol INHALE TWO PUFFS BY MOUTH EVERY 6 HOURS AS NEEDED FOR SHORTNESS OF BREATH 3 Inhaler 3   • magnesium oxide (MAG-OX) 400 MG Tab tablet Take 400 mg by mouth.     • Mometasone Furoate (ASMANEX, 120 METERED DOSES, INH) Inhale 2 Puffs by mouth every day.     • triamcinolone (TRIESENCE) 40 MG/ML Suspension by  "Intramuscular route.     • Cholecalciferol (VITAMIN D3) 2000 UNIT Cap Take  by mouth.       No current facility-administered medications on file prior to visit.        Allergies, past medical history, past surgical history, family history, social history reviewed and updated    ROS:  All other systems reviewed and are negative except as stated in the HPI       Physical Exam:     /64 (BP Location: Left arm, Patient Position: Sitting, BP Cuff Size: Adult)   Pulse 85   Temp 37.7 °C (99.9 °F) (Temporal)   Ht 1.651 m (5' 5\")   Wt 67.3 kg (148 lb 6.4 oz)   SpO2 97%   BMI 24.70 kg/m²   General: Normal appearing. No distress.  ENT: oropharynx without exudates.    Eyes: conjunctiva clear lids without ptosis  Pulmonary: Clear to ausculation.  Normal effort.   Cardiovascular: Regular rate and rhythm  Abdomen: Soft, nontender,  Lymph: No cervical or supraclavicular palpable lymph nodes  Psych: Normal mood and affect.     I have reviewed pertinent labs and diagnostic tests associated with this visit with specific comments listed under the assessment and plan below      Assessment and Plan:     65 y.o. male with the following issues.    1. Lower urinary tract symptoms (LUTS)  Likely BPH  - US-BLADDER; Future  - URINALYSIS,CULTURE IF INDICATED; Future  - tamsulosin (FLOMAX) 0.4 MG capsule; Take 1 Cap by mouth ONE-HALF HOUR AFTER BREAKFAST.  Dispense: 30 Cap; Refill: 3    2. 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  Dispense: 2.24 mL; Refill: 6  -Increase Trulicity from 0.75 mg weekly, continue above other medication for diabetes    -Discussed diabetic diet in detail, educated regarding management of hypoglycemia, advised regular exercise, educated disease management and weight goals as well as feet care and frequent check of feet.  -Patient to check early morning fasting blood glucose with goal discussed.  - asked to " have a BG log with daily fasting and 2 hr postprandial after biggest meal and bring to next visit or send through my chart  -Discussed side effects of medication. Patient confirmed understanding of information.      Follow Up:      Return in about 4 weeks (around 12/15/2020) for follow up.  For diabetes    Please note that this dictation was created using voice recognition software. I have made every reasonable attempt to correct obvious errors, but I expect that there are errors of grammar and possibly content that I did not discover before finalizing the note.    Signed by: Alvin Esquivel M.D.

## 2020-11-20 ENCOUNTER — TELEPHONE (OUTPATIENT)
Dept: MEDICAL GROUP | Facility: PHYSICIAN GROUP | Age: 65
End: 2020-11-20

## 2020-11-20 NOTE — TELEPHONE ENCOUNTER
----- Message from Alvin Esquivel M.D. sent at 11/20/2020 10:13 AM PST -----  Result released to my chart  Called patient, left voicemail about the urinalysis lab.  Advised patient to call the clinic if there is any worsening symptoms he is experiencing, also advised to inform us regarding his blood sugar level daily.

## 2020-11-30 ENCOUNTER — OFFICE VISIT (OUTPATIENT)
Dept: URGENT CARE | Facility: PHYSICIAN GROUP | Age: 65
End: 2020-11-30
Payer: COMMERCIAL

## 2020-11-30 ENCOUNTER — HOSPITAL ENCOUNTER (OUTPATIENT)
Dept: RADIOLOGY | Facility: MEDICAL CENTER | Age: 65
End: 2020-11-30
Attending: PHYSICIAN ASSISTANT
Payer: COMMERCIAL

## 2020-11-30 ENCOUNTER — HOSPITAL ENCOUNTER (OUTPATIENT)
Facility: MEDICAL CENTER | Age: 65
End: 2020-11-30
Attending: PHYSICIAN ASSISTANT
Payer: COMMERCIAL

## 2020-11-30 VITALS
OXYGEN SATURATION: 97 % | HEIGHT: 65 IN | RESPIRATION RATE: 16 BRPM | BODY MASS INDEX: 24.66 KG/M2 | SYSTOLIC BLOOD PRESSURE: 120 MMHG | TEMPERATURE: 98.6 F | HEART RATE: 98 BPM | WEIGHT: 148 LBS | DIASTOLIC BLOOD PRESSURE: 74 MMHG

## 2020-11-30 DIAGNOSIS — R31.9 HEMATURIA, UNSPECIFIED TYPE: ICD-10-CM

## 2020-11-30 DIAGNOSIS — R35.0 URINARY FREQUENCY: ICD-10-CM

## 2020-11-30 DIAGNOSIS — R30.0 DYSURIA: ICD-10-CM

## 2020-11-30 DIAGNOSIS — E11.65 UNCONTROLLED TYPE 2 DIABETES MELLITUS WITH HYPERGLYCEMIA (HCC): ICD-10-CM

## 2020-11-30 LAB
APPEARANCE UR: NORMAL
BILIRUB UR STRIP-MCNC: NEGATIVE MG/DL
COLOR UR AUTO: YELLOW
GLUCOSE UR STRIP.AUTO-MCNC: 500 MG/DL
KETONES UR STRIP.AUTO-MCNC: NEGATIVE MG/DL
LEUKOCYTE ESTERASE UR QL STRIP.AUTO: NEGATIVE
NITRITE UR QL STRIP.AUTO: NEGATIVE
PH UR STRIP.AUTO: 7 [PH] (ref 5–8)
PROT UR QL STRIP: 100 MG/DL
RBC UR QL AUTO: NORMAL
SP GR UR STRIP.AUTO: 1.01
UROBILINOGEN UR STRIP-MCNC: 0.2 MG/DL

## 2020-11-30 PROCEDURE — 81002 URINALYSIS NONAUTO W/O SCOPE: CPT | Performed by: PHYSICIAN ASSISTANT

## 2020-11-30 PROCEDURE — 76857 US EXAM PELVIC LIMITED: CPT

## 2020-11-30 PROCEDURE — 99214 OFFICE O/P EST MOD 30 MIN: CPT | Performed by: PHYSICIAN ASSISTANT

## 2020-11-30 PROCEDURE — 87086 URINE CULTURE/COLONY COUNT: CPT

## 2020-11-30 RX ORDER — FLUCONAZOLE 150 MG/1
150 TABLET ORAL DAILY
Qty: 1 TAB | Refills: 0 | Status: SHIPPED | OUTPATIENT
Start: 2020-11-30 | End: 2020-11-30

## 2020-11-30 RX ORDER — FLUCONAZOLE 150 MG/1
150 TABLET ORAL
Qty: 2 TAB | Refills: 0 | Status: SHIPPED | OUTPATIENT
Start: 2020-11-30 | End: 2020-12-28

## 2020-11-30 ASSESSMENT — ENCOUNTER SYMPTOMS
FLANK PAIN: 0
EYE DISCHARGE: 0
ABDOMINAL PAIN: 0
NAUSEA: 0
VOMITING: 0
SHORTNESS OF BREATH: 0
DIZZINESS: 0
BACK PAIN: 0
SORE THROAT: 0
FEVER: 0
CHILLS: 0
COUGH: 0
CONSTIPATION: 0
HEADACHES: 0
MYALGIAS: 0
DIARRHEA: 0

## 2020-11-30 NOTE — PROGRESS NOTES
Subjective:   Jerome Mendoza is a 65 y.o. male who presents for UTI (blood in urine, frequency, burning, x1 month )      HPI  65 y.o. male presents to urgent care with new problem to provider of intermittent hematuria, urinary frequency/urgency, and dysuria over the last month.  Patient has been seen for similar complaints at primary care provider's office recently.  Patient had bladder ultrasound ordered per PCP scheduled for 11/17/2020.  Patient was not able to make it to this appointment.  He is not followed by urology.  Patient denies history of smoking or cancers.  He denies flank pain, abdominal pain, fevers, or vomiting.  Denies other associated aggravating or alleviating factors.     Review of Systems   Constitutional: Negative for chills, fever and malaise/fatigue.   HENT: Negative for congestion and sore throat.    Eyes: Negative for discharge.   Respiratory: Negative for cough and shortness of breath.    Cardiovascular: Negative for chest pain.   Gastrointestinal: Negative for abdominal pain, constipation, diarrhea, nausea and vomiting.   Genitourinary: Positive for dysuria, frequency, hematuria and urgency. Negative for flank pain.   Musculoskeletal: Negative for back pain and myalgias.   Skin: Negative for rash.   Neurological: Negative for dizziness and headaches.   All other systems reviewed and are negative.      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)   • Hypertension associated with diabetes (HCC)   • Muscle cramps   • Lower urinary tract symptoms (LUTS)     Past Surgical History:   Procedure Laterality Date   • TONSILLECTOMY AND ADENOIDECTOMY     • VASECTOMY       Social History     Tobacco Use   • Smoking status: Never Smoker   • Smokeless tobacco: Never Used   • Tobacco comment: avoid all tobacco products   Substance Use Topics   • Alcohol  "use: No     Alcohol/week: 0.0 oz   • Drug use: No      Family History   Problem Relation Age of Onset   • Asthma Mother    • Diabetes Father    • Psychiatric Illness Father         dementia   • Heart Disease Neg Hx    • Stroke Neg Hx    • Cancer Neg Hx       (Allergies, Medications, & Tobacco/Substance Use were reconciled by the Medical Assistant and reviewed by myself. The family history is prepopulated)     Objective:     /74 (BP Location: Right arm, Patient Position: Sitting, BP Cuff Size: Adult)   Pulse 98   Temp 37 °C (98.6 °F) (Temporal)   Resp 16   Ht 1.651 m (5' 5\")   Wt 67.1 kg (148 lb)   SpO2 97%   BMI 24.63 kg/m²     Physical Exam  Vitals signs reviewed.   Constitutional:       General: He is not in acute distress.     Appearance: Normal appearance. He is well-developed. He is not ill-appearing.   HENT:      Head: Normocephalic and atraumatic.      Mouth/Throat:      Mouth: Mucous membranes are moist.      Pharynx: Oropharynx is clear.   Eyes:      General: No scleral icterus.     Conjunctiva/sclera: Conjunctivae normal.   Neck:      Musculoskeletal: Normal range of motion and neck supple.   Cardiovascular:      Rate and Rhythm: Normal rate and regular rhythm.      Heart sounds: Normal heart sounds.   Pulmonary:      Effort: Pulmonary effort is normal. No respiratory distress.      Breath sounds: Normal breath sounds.   Abdominal:      General: Abdomen is flat.      Palpations: Abdomen is soft.      Tenderness: There is no abdominal tenderness. There is no right CVA tenderness, left CVA tenderness or guarding.   Musculoskeletal: Normal range of motion.   Skin:     General: Skin is warm and dry.      Findings: No rash.   Neurological:      General: No focal deficit present.      Mental Status: He is alert and oriented to person, place, and time.   Psychiatric:         Mood and Affect: Mood normal.         Behavior: Behavior normal.         Thought Content: Thought content normal.         " Judgment: Judgment normal.         Assessment/Plan:     1. Hematuria, unspecified type  POCT Urinalysis    URINE CULTURE(NEW)    REFERRAL TO UROLOGY    US-BLADDER   2. Urinary frequency  REFERRAL TO UROLOGY    US-BLADDER    fluconazole (DIFLUCAN) 150 MG tablet       3. Uncontrolled type 2 diabetes mellitus with hyperglycemia (HCC)  fluconazole (DIFLUCAN) 150 MG tablet       4. Dysuria  fluconazole (DIFLUCAN) 150 MG tablet         Results for orders placed or performed in visit on 11/30/20   POCT Urinalysis   Result Value Ref Range    POC Color yellow Negative    POC Appearance cloudy Negative    POC Leukocyte Esterase negative Negative    POC Nitrites negative Negative    POC Urobiligen 0.2 Negative (0.2) mg/dL    POC Protein 100 Negative mg/dL    POC Urine PH 7.0 5.0 - 8.0    POC Blood large Negative    POC Specific Gravity 1.015 <1.005 - >1.030    POC Ketones negative Negative mg/dL    POC Bilirubin negative Negative mg/dL    POC Glucose 500 Negative mg/dL     Narrative & Impression        11/30/2020 2:49 PM     HISTORY/REASON FOR EXAM:  Pain; hematuria, frequency, dysuria x 1 month     TECHNIQUE/EXAM DESCRIPTION:  Transabdominal pelvic ultrasound.     COMPARISON:   None     FINDINGS:  Transabdominal scanning was performed.     Bladder is unremarkable.  Bilateral ureteral jets demonstrated.  Post void residual of approximately 16 mL.  Mildly prominent prostate.     IMPRESSION:     1.  Unremarkable bladder.  2.  Small postvoid residual.         Bladder ultrasound was ordered for patient and was scheduled to be done on 11/17/2020, however patient did not make it to this appointment.  I will reorder bladder ultrasound today, however I do believe patient may need further imaging and follow-up with urology if his symptoms persist.  Patient is taking Flomax as directed by primary care provider and states that his symptoms are much improved.  He still does have hematuria with intermittent dysuria and frequency.  Patient  treated empirically for yeast infection secondary to uncontrolled type 2 diabetes and urinary symptoms.    Differential diagnosis, natural history, supportive care, and indications for immediate follow-up discussed.    Advised the patient to follow-up with the primary care physician for recheck, reevaluation, and consideration of further management.  Patient verbalized understanding of treatment plan and has no further questions regarding care.     Please note that this dictation was created using voice recognition software. I have made a reasonable attempt to correct obvious errors, but I expect that there are errors of grammar and possibly content that I did not discover before finalizing the note.    This note was electronically signed by Annabella Rubi PA-C

## 2020-12-03 LAB
BACTERIA UR CULT: NORMAL
SIGNIFICANT IND 70042: NORMAL
SITE SITE: NORMAL
SOURCE SOURCE: NORMAL

## 2020-12-10 ENCOUNTER — NON-PROVIDER VISIT (OUTPATIENT)
Dept: MEDICAL GROUP | Facility: PHYSICIAN GROUP | Age: 65
End: 2020-12-10
Payer: COMMERCIAL

## 2020-12-10 PROCEDURE — 99211 OFF/OP EST MAY X REQ PHY/QHP: CPT | Performed by: INTERNAL MEDICINE

## 2020-12-10 NOTE — PROGRESS NOTES
Patient Consult Note    TIME IN: 8:56 AM  TIME OUT: 928am    Primary care physician: Alvin Esquivel M.D.    Reason for consult: Management of Uncontrolled Type 2 Diabetes    HPI:  Jerome Mendoza is a 65 y.o. old patient who comes in today for evaluation of above stated problem.    Most Recent HbA1c:   Lab Results   Component Value Date/Time    HBA1C 11.8 (H) 10/22/2020 11:35 AM      Lab Results   Component Value Date/Time    CREATININE 1.04 10/22/2020 11:35 AM            Diabetes Medication History and Current Regimen  Metformin: Metformin ER 1000 twice daily  GLP-1 Agent: Dulaglutide 1.5 mg once weekly   SGLT-2 Inhibitor:  Empagliflozin 25 mg once daily   Sulfonylurea:   Thiazolidinedione:   DPP-4 Inhibitor:   Megltitinide:   Other:     Basal Insulin:   Prandial Insulin:   Mixed Insulin:     Pt has home glucometer and proper testing technique - yes    Pt reports blood sugars:   Before Breakfast: 200,240,211    Hypoglycemia awareness - yes  Nocturnal hypoglycemia- no  Hypoglycemia:  None    Pt's treatment of Hypoglycemia    - 15:15 Rule discussed with patient    Current Exercise -he tries to exercise as frequently as possible.  He goes on walks around his neighborhood.  Exercise Goal -30 minutes at least 5 times a week.    Dietary -he is eating more junk food around the holidays.  He says his diet will improve once the holidays are over.    Foot Exam:  Visual Inspection: Feet without maceration, ulcers, fissures.  Feet dry.  Pedal pulses: intact bilaterally    Preventative Management  BP regimen (ACE/ARB) -lisinopril 10 mg daily      ASA -no      Statin -Lipitor 10 mg    Cardiovascular   Patient Type, check all that apply:   Primary Prevention        Lab Results   Component Value Date/Time    CHOLSTRLTOT 242 (H) 10/22/2020 11:35 AM     (H) 10/22/2020 11:35 AM    HDL 31 (A) 10/22/2020 11:35 AM    TRIGLYCERIDE 218 (H) 10/22/2020 11:35 AM       Lab Results   Component Value Date/Time    SODIUM 130 (L)  10/22/2020 11:35 AM    POTASSIUM 5.0 10/22/2020 11:35 AM    CHLORIDE 96 10/22/2020 11:35 AM    CO2 25 10/22/2020 11:35 AM    GLUCOSE 483 (H) 10/22/2020 11:35 AM    BUN 21 10/22/2020 11:35 AM    CREATININE 1.04 10/22/2020 11:35 AM     Lab Results   Component Value Date/Time    ALKPHOSPHAT 73 10/22/2020 11:35 AM    ASTSGOT 13 10/22/2020 11:35 AM    ALTSGPT 19 10/22/2020 11:35 AM    TBILIRUBIN 0.3 10/22/2020 11:35 AM        Last Retinal Scan -sees a ophthalmologist.  He is up-to-date.  Last Foot Exam -regularly examines feet.  No problems to report.  Last A1c -   Lab Results   Component Value Date/Time    HBA1C 11.8 (H) 10/22/2020 11:35 AM        Last Microalbuminuria -   Results for ASUNCION LAURA (MRN [id number]) as of 12/10/2020 10:29   Ref. 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   • Retinopathy of right eye 08/29/2016   • Microalbuminuria 09/28/2015   • Hyperlipidemia 06/19/2014   • Asthma 06/19/2014   • Wears hearing aid 06/19/2014       Past Surgical History:  Past Surgical History:   Procedure Laterality Date   • TONSILLECTOMY AND ADENOIDECTOMY     • VASECTOMY         Allergies:  Penicillin g    Social History:  Social History     Socioeconomic History   • Marital status:      Spouse name: Not on file   • Number of children: Not on file   • Years of education: Not on file   • Highest education level: Not on file   Occupational History   • Not on file   Social Needs   • Financial resource strain: Not on file   • Food insecurity     Worry: Not on file      Inability: Not on file   • Transportation needs     Medical: Not on file     Non-medical: Not on file   Tobacco Use   • Smoking status: Never Smoker   • Smokeless tobacco: Never Used   • Tobacco comment: avoid all tobacco products   Substance and Sexual Activity   • Alcohol use: No     Alcohol/week: 0.0 oz   • Drug use: No   • Sexual activity: Yes     Partners: Female   Lifestyle   • Physical activity     Days per week: Not on file     Minutes per session: Not on file   • Stress: Not on file   Relationships   • Social connections     Talks on phone: Not on file     Gets together: Not on file     Attends L[employer]-day service: Not on file     Active member of club or organization: Not on file     Attends meetings of clubs or organizations: Not on file     Relationship status: Not on file   • Intimate partner violence     Fear of current or ex partner: Not on file     Emotionally abused: Not on file     Physically abused: Not on file     Forced sexual activity: Not on file   Other Topics Concern   • Not on file   Social History Narrative   • Not on file       Family History:  Family History   Problem Relation Age of Onset   • Asthma Mother    • Diabetes Father    • Psychiatric Illness Father         dementia   • Heart Disease Neg Hx    • Stroke Neg Hx    • Cancer Neg Hx        Medications:    Current Outpatient Medications:   •  fluconazole (DIFLUCAN) 150 MG tablet, Take 1 Tab by mouth every 72 hours., Disp: 2 Tab, Rfl: 0  •  magnesium oxide (MAG-OX) 400 MG Tab tablet, Take 400 mg by mouth., Disp: , Rfl:   •  dorzolamide-timolol (COSOPT) 22.3-6.8 MG/ML Solution, INSTILL 1 DROP INTO EACH EYE TWICE DAILY, Disp: , Rfl:   •  tamsulosin (FLOMAX) 0.4 MG capsule, Take 1 Cap by mouth ONE-HALF HOUR AFTER BREAKFAST., Disp: 30 Cap, Rfl: 3  •  Dulaglutide 1.5 MG/0.5ML Solution Pen-injector, Inject 1 Each under the skin every 7 days., Disp: 2.24 mL, Rfl: 6  •  atorvastatin (LIPITOR) 10 MG Tab, Take 10 mg by mouth every evening., Disp:  , Rfl:   •  Empagliflozin 25 MG Tab, Take 1 Each by mouth every day., Disp: 60 Tab, Rfl: 1  •  lisinopril (PRINIVIL) 10 MG Tab, Take 1 Tab by mouth every day., Disp: 90 Tab, Rfl: 3  •  Mometasone Furoate (ASMANEX, 120 METERED DOSES, INH), Inhale 2 Puffs by mouth every day., Disp: , Rfl:   •  metformin ER modified (GLUMETZA) 1000 MG TABLET SR 24 HR, Take 1 Tab by mouth 2 Times a Day., Disp: 180 Tab, Rfl: 3  •  albuterol (VENTOLIN HFA) 108 (90 Base) MCG/ACT Aero Soln inhalation aerosol, INHALE TWO PUFFS BY MOUTH EVERY 6 HOURS AS NEEDED FOR SHORTNESS OF BREATH, Disp: 3 Inhaler, Rfl: 3  •  triamcinolone (TRIESENCE) 40 MG/ML Suspension, by Intramuscular route., Disp: , Rfl:   •  Cholecalciferol (VITAMIN D3) 2000 UNIT Cap, Take  by mouth., Disp: , Rfl:     Labs: Reviewed    Physical Examination:  Vital signs: There were no vitals taken for this visit. There is no height or weight on file to calculate BMI.    Assessment and Plan:  · We spent a considerable amount of time talking about the flu vaccine and possible Covid vaccine.  I highly encouraged him to get a flu vaccine.  He has allergies to penicillin, and based on current recommendations from the [place] the Covid vaccine might be contraindicated in patients with severe allergies requiring epinephrine.  This was outside of the scope of this visit but I tried to give him as much information as I could today.    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  I would have started him on insulin today if not for this complicating factor.    1. DM2    - Medication   · Metformin: Metformin ER 1000 twice daily  · GLP-1 Agent: Dulaglutide 1.5 mg once weekly-possibly increase at the next appointment.  · SGLT-2 Inhibitor:  Empagliflozin 25 mg once daily   · Possibly start insulin at the next appointment, if this does not qualify him from his DOT  requirements    -Blood glucose and A1c target    • However, more aggressive diabetic control is reasonable when tolerated.    2.  Cardiovascular  He will likely need to increase his statin after the next lipid panel.    - Lifestyle changes     · Focus on eating a Mediterranean-style diet, as seen above  · Exercise 30 minutes daily at least 5 days/week, as tolerated.      Follow-up appointment in 4 weeks with PCP then 4 weeks afterwards with me    Jese Key, PharmD, MS, BCACP, LCC    [facility]  Phone [phone] fax [phone]    This note was created using voice recognition software (Dragon). The accuracy of the dictation is limited by the abilities of the software. I have reviewed the note prior to signing, however some errors in grammar and context are still possible. If you have any questions related to this note please do not hesitate to contact our office.     12/10/20    CC:   Alvin Esquivel M.D.  No ref. provider found

## 2020-12-10 NOTE — Clinical Note
Dr. 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  He will investigate this and get back with me.  Do know if this disqualifies him?    Thanks  Joesph

## 2020-12-18 ENCOUNTER — OFFICE VISIT (OUTPATIENT)
Dept: MEDICAL GROUP | Facility: PHYSICIAN GROUP | Age: 65
End: 2020-12-18
Payer: COMMERCIAL

## 2020-12-18 VITALS
TEMPERATURE: 97.4 F | WEIGHT: 148 LBS | BODY MASS INDEX: 24.66 KG/M2 | SYSTOLIC BLOOD PRESSURE: 118 MMHG | DIASTOLIC BLOOD PRESSURE: 68 MMHG | HEART RATE: 71 BPM | OXYGEN SATURATION: 100 % | HEIGHT: 65 IN

## 2020-12-18 DIAGNOSIS — R80.9 TYPE 2 DIABETES MELLITUS WITH ALBUMINURIA (HCC): ICD-10-CM

## 2020-12-18 DIAGNOSIS — E11.29 TYPE 2 DIABETES MELLITUS WITH ALBUMINURIA (HCC): ICD-10-CM

## 2020-12-18 PROCEDURE — 99213 OFFICE O/P EST LOW 20 MIN: CPT | Performed by: NURSE PRACTITIONER

## 2020-12-23 ENCOUNTER — TELEPHONE (OUTPATIENT)
Dept: MEDICAL GROUP | Facility: PHYSICIAN GROUP | Age: 65
End: 2020-12-23

## 2020-12-23 NOTE — TELEPHONE ENCOUNTER
ESTABLISHED PATIENT PRE-VISIT PLANNING     Patient was NOT contacted to complete PVP.     Note: Patient will not be contacted if there is no indication to call.     1.  Reviewed notes from the last few office visits within the medical group: Yes    2.  If any orders were placed at last visit or intended to be done for this visit (i.e. 6 mos follow-up), do we have Results/Consult Notes?         •  Labs - Labs were not ordered at last office visit.  Note: If patient appointment is for lab review and patient did not complete labs, check with provider if OK to reschedule patient until labs completed.       •  Imaging - Imaging was not ordered at last office visit.       •  Referrals - Referral ordered, patient has NOT been seen.    3. Is this appointment scheduled as a Hospital Follow-Up? No    4.  Immunizations were updated in Epic using Reconcile Outside Information activity? Yes    5.  Patient is due for the following Health Maintenance Topics:   Health Maintenance Due   Topic Date Due   • IMM HEP B VACCINE (1 of 3 - Risk 3-dose series) 10/26/1972   • IMM DTaP/Tdap/Td Vaccine (1 - Tdap) 10/26/1972   • IMM ZOSTER VACCINES (1 of 2) 10/26/2003   • COLON CANCER SCREENING ANNUAL FIT  08/22/2019   • IMM INFLUENZA (1) 09/01/2020       6.  AHA (Pulse8) form printed for Provider? N/A

## 2020-12-27 NOTE — PROGRESS NOTES
Patient was seen for a total of 20 minutes face-to-face by myself, with more than half of the time spent coordinating care; reviewing records; completion of paperwork.          Chief Complaint   Patient presents with   • Paperwork         Subjective:     Jerome Mendoza is a 65 y.o. male presenting for completion of [facility] paperwork regarding uncontrolled DM and urinary frequency.      Pt works as a  and is unable to drive for long distances due to medical conditions.    Is undergoing treatment for diabetic retinopathy and due to visual impairment cannot drive safely. Is working with retinal specialist and PCP for better control and management.       Review of systems:      Denies chest pain, shortness of breath, sore throat, difficulty swallowing, new cough, dizziness, severe headache, altered cognition, painful or swollen lymph nodes.       Current Outpatient Medications:   •  fluconazole (DIFLUCAN) 150 MG tablet, Take 1 Tab by mouth every 72 hours., Disp: 2 Tab, Rfl: 0  •  magnesium oxide (MAG-OX) 400 MG Tab tablet, Take 400 mg by mouth., Disp: , Rfl:   •  dorzolamide-timolol (COSOPT) 22.3-6.8 MG/ML Solution, INSTILL 1 DROP INTO EACH EYE TWICE DAILY, Disp: , Rfl:   •  tamsulosin (FLOMAX) 0.4 MG capsule, Take 1 Cap by mouth ONE-HALF HOUR AFTER BREAKFAST., Disp: 30 Cap, Rfl: 3  •  Dulaglutide 1.5 MG/0.5ML Solution Pen-injector, Inject 1 Each under the skin every 7 days., Disp: 2.24 mL, Rfl: 6  •  atorvastatin (LIPITOR) 10 MG Tab, Take 10 mg by mouth every evening., Disp: , Rfl:   •  Empagliflozin 25 MG Tab, Take 1 Each by mouth every day., Disp: 60 Tab, Rfl: 1  •  lisinopril (PRINIVIL) 10 MG Tab, Take 1 Tab by mouth every day., Disp: 90 Tab, Rfl: 3  •  Mometasone Furoate (ASMANEX, 120 METERED DOSES, INH), Inhale 2 Puffs by mouth every day., Disp: , Rfl:   •  metformin ER modified (GLUMETZA) 1000 MG TABLET SR 24 HR, Take 1 Tab by mouth 2 Times a Day., Disp: 180 Tab, Rfl: 3  •  albuterol (VENTOLIN HFA)  "108 (90 Base) MCG/ACT Aero Soln inhalation aerosol, INHALE TWO PUFFS BY MOUTH EVERY 6 HOURS AS NEEDED FOR SHORTNESS OF BREATH, Disp: 3 Inhaler, Rfl: 3  •  triamcinolone (TRIESENCE) 40 MG/ML Suspension, by Intramuscular route., Disp: , Rfl:   •  Cholecalciferol (VITAMIN D3) 2000 UNIT Cap, Take  by mouth., Disp: , Rfl:     Allergies, past medical history, past surgical history, family history, social history reviewed and updated    Objective:     Vitals: /68 (BP Location: Right arm, Patient Position: Sitting, BP Cuff Size: Adult)   Pulse 71   Temp 36.3 °C (97.4 °F) (Temporal)   Ht 1.651 m (5' 5\")   Wt 67.1 kg (148 lb)   SpO2 100%   BMI 24.63 kg/m²   Constitutional: Alert, no distress, well-groomed.  Skin: Warm, dry, good turgor, no rashes in visible areas.  Eye: Equal, round and reactive, conjunctiva clear, lids normal.  ENMT: Lips without lesions, good dentition, moist mucous membranes.  Neck: Trachea midline, no masses, no thyromegaly.  Respiratory: Unlabored respiratory effort, no cough.  MSK: Normal gait, moves all extremities.  Neuro: Grossly non-focal.   Psych: Alert and oriented x3, normal affect and mood.  Assessment/Plan:     Jerome was seen today for paperwork.    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      Return if symptoms worsen or fail to improve.    Patient verbalized understanding and agreed to plan of care.  Encouraged to contact me with needs via [employer] or by phone if needed.      I have placed the above orders and discussed them with an approved delegating provider.  The MA is performing the below orders under the direction of Dr Choudhury.    Please note that this dictation was created using voice recognition software. I have made every reasonable attempt to correct obvious errors, but I expect that there are errors of grammar and possibly content that I did not discover before " finalizing the note.

## 2020-12-28 ENCOUNTER — OFFICE VISIT (OUTPATIENT)
Dept: MEDICAL GROUP | Facility: PHYSICIAN GROUP | Age: 65
End: 2020-12-28
Payer: COMMERCIAL

## 2020-12-28 VITALS
DIASTOLIC BLOOD PRESSURE: 62 MMHG | BODY MASS INDEX: 25.12 KG/M2 | HEIGHT: 65 IN | OXYGEN SATURATION: 96 % | SYSTOLIC BLOOD PRESSURE: 110 MMHG | HEART RATE: 87 BPM | TEMPERATURE: 97.3 F | WEIGHT: 150.8 LBS

## 2020-12-28 DIAGNOSIS — R80.9 TYPE 2 DIABETES MELLITUS WITH ALBUMINURIA (HCC): ICD-10-CM

## 2020-12-28 DIAGNOSIS — E55.9 VITAMIN D DEFICIENCY: ICD-10-CM

## 2020-12-28 DIAGNOSIS — E11.29 TYPE 2 DIABETES MELLITUS WITH ALBUMINURIA (HCC): ICD-10-CM

## 2020-12-28 PROCEDURE — 99214 OFFICE O/P EST MOD 30 MIN: CPT | Performed by: INTERNAL MEDICINE

## 2020-12-28 RX ORDER — ACETAMINOPHEN 160 MG
1 TABLET,DISINTEGRATING ORAL DAILY
Qty: 90 CAP | Refills: 1 | Status: SHIPPED | OUTPATIENT
Start: 2020-12-28 | End: 2021-08-18 | Stop reason: SDUPTHER

## 2020-12-28 NOTE — PATIENT INSTRUCTIONS
Natural vitamins from whole foods (fruit and vegetable)  Vitamin B complex supplement (methylcobalamin B12, methyl folate B9).   Magnesium supplement 200 to 400 mg daily  Vitamin D3 supplement   Essential oil supplement (cod liver oil)      LIFESTYLE MEDICINE:       1) Make SMART lifestyle changes: The lifestyle changes that you need to make are with regards to: nutrition, cardiovascular exercise, sleep, stress management.         2) Nutrition:     1- Reduce carbohydrates, no added sugar at all, try to avoid hidden carbohydrates (including breads, pasta, cereals/oatmeal).  Avoid soda, processed carbs and sugars including cookies, candies, donuts, jellies, avoid all added sugar beverages including avoidance of diet soda/Gatorade, Powerade, Marciano-Aid, sweetened ice tea.  Avoid all the sweeteners.    2- Eat vegetables (organic is much better to avoid herbicide/insecticide): including green leafy vegetables, steamed or cooked with healthy oils such as olive oil, avocado oil or coconut oil.  Avoid vegetable oil (sunflower, soy, corn, canola, sufflower, cottonseed or peanut oil).  Include cruciferous vegetables in your diets such as kale, cabbage, cauliflower, [place] sprouts, broccoli, [employer]    3-Try to focus on fruits with low glycemic index (less amount of fructose sugar), best fruits that are rich with vitamin/minerals as well as antioxidants are berries (you could take up to 1 cup of Berries a day). Avoid fruit juices ( even worse than SODA).     4-Eat healthy fat and meats from grass fed animals (grass fed cow/lamb meat, grass fed chicken/poultry, wild-caught fish and seafood) as well as pasteurized eggs from grass fed chicken    5-when you eat dairy foods, eat as a whole fat with no added sugar, sweetener or flavors.  You could add berries, nuts or seeds into the yogurt.  Avoid skimmed milk/free fat milk/2% milk    6-Eat when you feel hungry. Avoid meals if you don't feel hungry.    7-1 big glass of water,  mix  with lemon, add 1-2 tablespoon of apple cider vinegar as well as 2 cloves of garlic>> very helpful    8-INTERMITTENT FASTING is very very very powerful           3) Cardiovascular Exercise: The center for disease control recommends a minimum of 150 minutes per week of moderate intensity cardiovascular exercise for weight maintenance and cardiovascular health.  Set this as your initial goal, with at least 30 minutes per session. Types of exercise can include 30 minutes of elliptical, 30 minutes of decently fast jog, 30 minutes of swimming, 30 minutes of heavy gardening (lifting big bags of fertilizer, digging deep holes/ditches).  You can cut down the minute requirements to half, by doing higher intensity sports such as a game of tennis, or soccer.        4) Sleep:    A) Goal: Obtain a minimum of 7-8hours of continuous, uninterrupted, restful sleep per night.    B) Tips for Sleep Hygiene:    I) Go to bed and wake up at consistent times whether work/school day or not.   II) Keep room dark, quiet, and comfortable.  Increase exposure to sunlight during awake times and avoid bright lights (especially anything with a backlight) at least the last 1-2hours before going to sleep.     III) Avoid stimulant or caffeine use in the evening     5) Stress Management: You cannot change the stresses of life necessarily, but you can change how he responds to them. One good way to manage stress is to write things down in order to help you process how to approach things in general or specifically. Another good way is to talk it out with someone you trusts, specifically your significant other or good friend. A definite great way to deal with stress is to have cardiovascular exercise!

## 2020-12-28 NOTE — PROGRESS NOTES
Established Patient    Chief Complaint   Patient presents with   • Diabetes Follow-up       Subjective:     HPI:   Jerome presents today with the following.    1. 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)  Patient check blood glucose before breakfast is still in range of 200s, mention he is taking Metformin 1000 twice daily, Jardiance 25 mg daily, Trulicity 1.5 mg weekly, patient try to eat healthier options with less sugar and carbs as well  >> Likely is following up with ophthalmology for his blurred vision and he is taking eye solution as well    Patient Active Problem List    Diagnosis Date Noted   • Vitamin D deficiency 12/28/2020   • Lower urinary tract symptoms (LUTS) 11/17/2020   • Muscle cramps 11/02/2020   • Hypertension associated with diabetes (HCC) 03/09/2020   • 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[facility]) 10/20/2016   • Retinopathy of right eye 08/29/2016   • Microalbuminuria 09/28/2015   • Hyperlipidemia 06/19/2014   • Asthma 06/19/2014   • Wears hearing aid 06/19/2014       Current Outpatient Medications on File Prior to Visit   Medication Sig Dispense Refill   • magnesium oxide (MAG-OX) 400 MG Tab tablet Take 400 mg by mouth.     • tamsulosin (FLOMAX) 0.4 MG capsule Take 1 Cap by mouth ONE-HALF HOUR AFTER BREAKFAST. 30 Cap 3   • Dulaglutide 1.5 MG/0.5ML Solution Pen-injector Inject 1 Each under the skin every 7 days. 2.24 mL 6   • Empagliflozin 25 MG Tab Take 1 Each by mouth every day. 60 Tab 1   • lisinopril (PRINIVIL) 10 MG Tab Take 1 Tab by mouth every day. 90 Tab 3   • Mometasone Furoate (ASMANEX, 120 METERED DOSES, INH) Inhale 2 Puffs by mouth every day.     • metformin ER modified (GLUMETZA) 1000 MG TABLET SR 24 HR Take 1 Tab by mouth 2 Times a Day. 180 Tab 3   • albuterol (VENTOLIN HFA) 108 (90 Base) MCG/ACT Aero Soln inhalation aerosol INHALE TWO PUFFS BY MOUTH EVERY 6 HOURS AS NEEDED FOR SHORTNESS OF BREATH 3 Inhaler 3   • dorzolamide-timolol (COSOPT) 22.3-6.8 MG/ML Solution  "INSTILL 1 DROP INTO EACH EYE TWICE DAILY     • atorvastatin (LIPITOR) 10 MG Tab Take 10 mg by mouth every evening.     • triamcinolone (TRIESENCE) 40 MG/ML Suspension by Intramuscular route.       No current facility-administered medications on file prior to visit.        Allergies, past medical history, past surgical history, family history, social history reviewed and updated    ROS:  All other systems reviewed and are negative except as stated in the HPI       Physical Exam:     /62 (BP Location: Right arm, Patient Position: Sitting, BP Cuff Size: Adult)   Pulse 87   Temp 36.3 °C (97.3 °F) (Temporal)   Ht 1.651 m (5' 5\")   Wt 68.4 kg (150 lb 12.8 oz)   SpO2 96%   BMI 25.09 kg/m²   General: Normal appearing. No distress.  ENT: oropharynx without exudates.    Eyes: conjunctiva clear lids without ptosis  Pulmonary: Clear to ausculation.  Normal effort.   Cardiovascular: Regular rate and rhythm  Abdomen: Soft, nontender,  Lymph: No cervical or supraclavicular palpable lymph nodes  Psych: Normal mood and affect.     I have reviewed pertinent labs and diagnostic tests associated with this visit with specific comments listed under the assessment and plan below      Assessment and Plan:     65 y.o. male with the following issues.    1. 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  -Patient to check early morning fasting blood glucose with goal discussed.  - asked to have a BG log with daily fasting and 2 hr postprandial after biggest meal and bring to next visit or send through my chart  -Discussed side effects of " medication. Patient confirmed understanding of information.    2. Vitamin D deficiency  - Cholecalciferol (VITAMIN D3) 2000 UNIT Cap; Take 1 Cap by mouth every day.  Dispense: 90 Cap; Refill: 1        Follow Up:      Return in about 5 weeks (around 2/1/2021) for follow up.   Diabetes, labs  1st/2nd    Please note that this dictation was created using voice recognition software. I have made every reasonable attempt to correct obvious errors, but I expect that there are errors of grammar and possibly content that I did not discover before finalizing the note.    Signed by: Alvin Esquivel M.D.

## 2020-12-29 ENCOUNTER — TELEPHONE (OUTPATIENT)
Dept: MEDICAL GROUP | Facility: PHYSICIAN GROUP | Age: 65
End: 2020-12-29

## 2021-01-12 DIAGNOSIS — R39.9 LOWER URINARY TRACT SYMPTOMS (LUTS): ICD-10-CM

## 2021-01-13 ENCOUNTER — OFFICE VISIT (OUTPATIENT)
Dept: MEDICAL GROUP | Facility: PHYSICIAN GROUP | Age: 66
End: 2021-01-13
Payer: COMMERCIAL

## 2021-01-13 VITALS
HEART RATE: 85 BPM | TEMPERATURE: 97.3 F | SYSTOLIC BLOOD PRESSURE: 102 MMHG | BODY MASS INDEX: 24.64 KG/M2 | WEIGHT: 147.9 LBS | OXYGEN SATURATION: 98 % | HEIGHT: 65 IN | DIASTOLIC BLOOD PRESSURE: 68 MMHG

## 2021-01-13 DIAGNOSIS — E11.29 TYPE 2 DIABETES MELLITUS WITH ALBUMINURIA (HCC): ICD-10-CM

## 2021-01-13 DIAGNOSIS — R80.9 TYPE 2 DIABETES MELLITUS WITH ALBUMINURIA (HCC): ICD-10-CM

## 2021-01-13 DIAGNOSIS — Z02.89 ENCOUNTER FOR COMPLETION OF FORM WITH PATIENT: ICD-10-CM

## 2021-01-13 PROCEDURE — 99214 OFFICE O/P EST MOD 30 MIN: CPT | Performed by: INTERNAL MEDICINE

## 2021-01-13 NOTE — PROGRESS NOTES
Established Patient    Chief Complaint   Patient presents with   • Paperwork       Subjective:     HPI:   Jerome presents today with the following.    1. Encounter for completion of form with patient  2. 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)  Patient is mainly here for completion of the form from the work that he obtain health insurance, the form filled out today  -Regarding his diabetes, mention he is doing better job, mention blood glucose before breakfast range from 140s to 160s, mention that when he eats late his blood sugar goes up, otherwise he is also eating a lot of beans and brown rice and sometimes she take 1 some unhealthy food items including sugars, otherwise overall he is doing better  -Patient is compliant with Trulicity 1.5 mg weekly, Jardiance 25 mg daily, Metformin 1000 twice daily  -Mention regular follow-up with ophthalmology for his retinopathy as well      Patient Active Problem List    Diagnosis Date Noted   • Vitamin D deficiency 12/28/2020   • Lower urinary tract symptoms (LUTS) 11/17/2020   • Muscle cramps 11/02/2020   • Hypertension associated with diabetes ([facility]) 03/09/2020   • 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[facility]) 10/20/2016   • Retinopathy of right eye 08/29/2016   • Microalbuminuria 09/28/2015   • Hyperlipidemia 06/19/2014   • Asthma 06/19/2014   • Wears hearing aid 06/19/2014       Current Outpatient Medications on File Prior to Visit   Medication Sig Dispense Refill   • Cholecalciferol (VITAMIN D3) 2000 UNIT Cap Take 1 Cap by mouth every day. 90 Cap 1   • magnesium oxide (MAG-OX) 400 MG Tab tablet Take 400 mg by mouth.     • dorzolamide-timolol (COSOPT) 22.3-6.8 MG/ML Solution INSTILL 1 DROP INTO EACH EYE TWICE DAILY     • tamsulosin (FLOMAX) 0.4 MG capsule Take 1 Cap by mouth ONE-HALF HOUR AFTER BREAKFAST. 30 Cap 3   • Dulaglutide 1.5 MG/0.5ML Solution Pen-injector Inject 1 Each under the skin every 7 days. 2.24 mL 6   • atorvastatin (LIPITOR) 10 MG Tab Take 10 mg  "by mouth every evening.     • Empagliflozin 25 MG Tab Take 1 Each by mouth every day. 60 Tab 1   • lisinopril (PRINIVIL) 10 MG Tab Take 1 Tab by mouth every day. 90 Tab 3   • Mometasone Furoate (ASMANEX, 120 METERED DOSES, INH) Inhale 2 Puffs by mouth every day.     • metformin ER modified (GLUMETZA) 1000 MG TABLET SR 24 HR Take 1 Tab by mouth 2 Times a Day. 180 Tab 3   • albuterol (VENTOLIN HFA) 108 (90 Base) MCG/ACT Aero Soln inhalation aerosol INHALE TWO PUFFS BY MOUTH EVERY 6 HOURS AS NEEDED FOR SHORTNESS OF BREATH 3 Inhaler 3   • triamcinolone (TRIESENCE) 40 MG/ML Suspension by Intramuscular route.       No current facility-administered medications on file prior to visit.        Allergies, past medical history, past surgical history, family history, social history reviewed and updated    ROS:  All other systems reviewed and are negative except as stated in the HPI       Physical Exam:     /68 (BP Location: Left arm, Patient Position: Sitting, BP Cuff Size: Adult)   Pulse 85   Temp 36.3 °C (97.3 °F) (Temporal)   Ht 1.651 m (5' 5\")   Wt 67.1 kg (147 lb 14.4 oz)   SpO2 98%   BMI 24.61 kg/m²   General: Normal appearing. No distress.  ENT: oropharynx without exudates.    Eyes: conjunctiva clear lids without ptosis  Pulmonary: Clear to ausculation.  Normal effort.   Cardiovascular: Regular rate and rhythm  Abdomen: Soft, nontender,  Lymph: No cervical or supraclavicular palpable lymph nodes  Psych: Normal mood and affect.     I have reviewed pertinent labs and diagnostic tests associated with this visit with specific comments listed under the assessment and plan below      Assessment and Plan:     65 y.o. male with the following issues.    1. Encounter for completion of form with patient  2. 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  -Patient to check early morning fasting blood glucose with goal discussed.  - asked to have a BG log with daily fasting and 2 hr postprandial after biggest meal and bring to next visit or send through my chart  -Discussed side effects of medication. Patient confirmed understanding of information.    Follow Up:   Follow-up February 3, 2021 for diabetes and other chronic condition    Please note that this dictation was created using voice recognition software. I have made every reasonable attempt to correct obvious errors, but I expect that there are errors of grammar and possibly content that I did not discover before finalizing the note.    Signed by: Alvin Esquivel M.D.

## 2021-01-14 RX ORDER — TAMSULOSIN HYDROCHLORIDE 0.4 MG/1
0.4 CAPSULE ORAL
Qty: 90 CAP | Refills: 1 | Status: SHIPPED | OUTPATIENT
Start: 2021-01-14 | End: 2021-08-18 | Stop reason: SDUPTHER

## 2021-01-25 RX ORDER — ATORVASTATIN CALCIUM 10 MG/1
10 TABLET, FILM COATED ORAL EVERY EVENING
Qty: 90 TAB | Refills: 0 | Status: SHIPPED | OUTPATIENT
Start: 2021-01-25 | End: 2021-04-20

## 2021-01-28 ENCOUNTER — HOSPITAL ENCOUNTER (OUTPATIENT)
Dept: LAB | Facility: MEDICAL CENTER | Age: 66
End: 2021-01-28
Attending: INTERNAL MEDICINE
Payer: COMMERCIAL

## 2021-01-28 DIAGNOSIS — R80.9 TYPE 2 DIABETES MELLITUS WITH ALBUMINURIA (HCC): ICD-10-CM

## 2021-01-28 DIAGNOSIS — E11.29 TYPE 2 DIABETES MELLITUS WITH ALBUMINURIA (HCC): ICD-10-CM

## 2021-01-28 LAB
25(OH)D3 SERPL-MCNC: 34 NG/ML (ref 30–100)
EST. AVERAGE GLUCOSE BLD GHB EST-MCNC: 200 MG/DL
FOLATE SERPL-MCNC: 16 NG/ML
HBA1C MFR BLD: 8.6 % (ref 0–5.6)
TSH SERPL DL<=0.005 MIU/L-ACNC: 2 UIU/ML (ref 0.38–5.33)
VIT B12 SERPL-MCNC: 401 PG/ML (ref 211–911)

## 2021-01-28 PROCEDURE — 83735 ASSAY OF MAGNESIUM: CPT

## 2021-01-28 PROCEDURE — 82306 VITAMIN D 25 HYDROXY: CPT

## 2021-01-28 PROCEDURE — 83036 HEMOGLOBIN GLYCOSYLATED A1C: CPT

## 2021-01-28 PROCEDURE — 82607 VITAMIN B-12: CPT

## 2021-01-28 PROCEDURE — 82746 ASSAY OF FOLIC ACID SERUM: CPT

## 2021-01-28 PROCEDURE — 84443 ASSAY THYROID STIM HORMONE: CPT

## 2021-01-28 PROCEDURE — 36415 COLL VENOUS BLD VENIPUNCTURE: CPT

## 2021-01-30 LAB — MAGNESIUM RBC-SCNC: 2.5 MMOL/L (ref 1.5–3.1)

## 2021-02-03 ENCOUNTER — OFFICE VISIT (OUTPATIENT)
Dept: MEDICAL GROUP | Facility: PHYSICIAN GROUP | Age: 66
End: 2021-02-03
Payer: COMMERCIAL

## 2021-02-03 VITALS
HEART RATE: 84 BPM | HEIGHT: 65 IN | OXYGEN SATURATION: 97 % | WEIGHT: 154.5 LBS | TEMPERATURE: 97.7 F | DIASTOLIC BLOOD PRESSURE: 76 MMHG | SYSTOLIC BLOOD PRESSURE: 118 MMHG | BODY MASS INDEX: 25.74 KG/M2

## 2021-02-03 DIAGNOSIS — E11.29 TYPE 2 DIABETES MELLITUS WITH ALBUMINURIA (HCC): ICD-10-CM

## 2021-02-03 DIAGNOSIS — R25.2 MUSCLE CRAMPS: ICD-10-CM

## 2021-02-03 DIAGNOSIS — E78.2 MIXED HYPERLIPIDEMIA: ICD-10-CM

## 2021-02-03 DIAGNOSIS — R80.9 TYPE 2 DIABETES MELLITUS WITH ALBUMINURIA (HCC): ICD-10-CM

## 2021-02-03 PROCEDURE — 99214 OFFICE O/P EST MOD 30 MIN: CPT | Performed by: INTERNAL MEDICINE

## 2021-02-03 ASSESSMENT — PATIENT HEALTH QUESTIONNAIRE - PHQ9: CLINICAL INTERPRETATION OF PHQ2 SCORE: 0

## 2021-02-03 NOTE — PROGRESS NOTES
Established Patient    Chief Complaint   Patient presents with   • Follow-Up     discuss labs, diabetes and other chronic condition       Subjective:     HPI:   Jerome presents today with the following.    1. 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[facility])  A1c:   Lab Results   Component Value Date/Time    HBA1C 8.6 (H) 01/28/2021 1041    HBA1C 11.8 (H) 10/22/2020 1135    HBA1C 9.7 (H) 02/24/2020 0845    AVGLUC 200 01/28/2021 1041    AVGLUC 292 10/22/2020 1135    AVGLUC 232 02/24/2020 0845   Overall A1c is improved, patient try to eat healthier option, avoidance of simple sugar, try to do more exercise as well, otherwise he is compliant with Jardiance 25 mg daily, Metformin 1000 twice daily, Trulicity 1.5 mg weekly, denied having side effect      2. Muscle cramps  3. Mixed hyperlipidemia  Patient was supposed to be on statin Lipitor, patient stopped taking that for a while, his LDL is still high, patient is stopped taking statin secondary to muscle cramp and pain      Patient Active Problem List    Diagnosis Date Noted   • Vitamin D deficiency 12/28/2020   • Lower urinary tract symptoms (LUTS) 11/17/2020   • Muscle cramps 11/02/2020   • Hypertension associated with diabetes ([facility]) 03/09/2020   • 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[facility]) 10/20/2016   • Retinopathy of right eye 08/29/2016   • Microalbuminuria 09/28/2015   • Hyperlipidemia 06/19/2014   • Asthma 06/19/2014   • Wears hearing aid 06/19/2014       Current Outpatient Medications on File Prior to Visit   Medication Sig Dispense Refill   • atorvastatin (LIPITOR) 10 MG Tab Take 1 Tab by mouth every evening. 90 Tab 0   • tamsulosin (FLOMAX) 0.4 MG capsule Take 1 Cap by mouth 1/2 hour after breakfast. 90 Cap 1   • Cholecalciferol (VITAMIN D3) 2000 UNIT Cap Take 1 Cap by mouth every day. 90 Cap 1   • magnesium oxide (MAG-OX) 400 MG Tab tablet Take 400 mg by mouth.     • dorzolamide-timolol (COSOPT) 22.3-6.8 MG/ML Solution INSTILL 1 DROP INTO EACH EYE TWICE  "DAILY     • Dulaglutide 1.5 MG/0.5ML Solution Pen-injector Inject 1 Each under the skin every 7 days. 2.24 mL 6   • Empagliflozin 25 MG Tab Take 1 Each by mouth every day. 60 Tab 1   • lisinopril (PRINIVIL) 10 MG Tab Take 1 Tab by mouth every day. 90 Tab 3   • Mometasone Furoate (ASMANEX, 120 METERED DOSES, INH) Inhale 2 Puffs by mouth every day.     • metformin ER modified (GLUMETZA) 1000 MG TABLET SR 24 HR Take 1 Tab by mouth 2 Times a Day. 180 Tab 3   • albuterol (VENTOLIN HFA) 108 (90 Base) MCG/ACT Aero Soln inhalation aerosol INHALE TWO PUFFS BY MOUTH EVERY 6 HOURS AS NEEDED FOR SHORTNESS OF BREATH 3 Inhaler 3   • triamcinolone (TRIESENCE) 40 MG/ML Suspension by Intramuscular route.       No current facility-administered medications on file prior to visit.        Allergies, past medical history, past surgical history, family history, social history reviewed and updated    ROS:  All other systems reviewed and are negative except as stated in the HPI     Physical Exam:     /76 (BP Location: Right arm, Patient Position: Sitting, BP Cuff Size: Adult)   Pulse 84   Temp 36.5 °C (97.7 °F) (Temporal)   Ht 1.651 m (5' 5\")   Wt 70.1 kg (154 lb 8 oz)   SpO2 97%   BMI 25.71 kg/m²   General: Normal appearing. No distress.  ENT: oropharynx without exudates.    Eyes: conjunctiva clear lids without ptosis  Pulmonary: Clear to ausculation.  Normal effort.   Cardiovascular: Regular rate and rhythm  Abdomen: Soft, nontender,  Lymph: No cervical or supraclavicular palpable lymph nodes  Psych: Normal mood and affect.     I have reviewed pertinent labs and diagnostic tests associated with this visit with specific comments listed under the assessment and plan below      Assessment and Plan:     65 y.o. male with the following issues.    1. 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  -Patient to check early morning fasting blood glucose with goal discussed.  -Discussed side effects of medication. Patient confirmed understanding of information.      2. Muscle cramps  3. Mixed hyperlipidemia  Patient educated to start atorvastatin again and watch his symptoms, advised him to take magnesium supplements for muscle cramps      Follow Up:      Return in about 6 weeks (around 3/17/2021) for follow up.   Diabetes, lifestyle, finish    Please note that this dictation was created using voice recognition software. I have made every reasonable attempt to correct obvious errors, but I expect that there are errors of grammar and possibly content that I did not discover before finalizing the note.    Signed by: Alvin Esquivel M.D.

## 2021-02-03 NOTE — PATIENT INSTRUCTIONS
Natural vitamins from whole foods (fruit and vegetable)  Vitamin B complex supplement (methylcobalamin B12, methyl folate B9).   Magnesium supplement 200 to 400 mg daily  Vitamin D3 supplement   Essential oil supplement (cod liver oil)      LIFESTYLE MEDICINE:       1) Make SMART lifestyle changes: The lifestyle changes that you need to make are with regards to: nutrition, cardiovascular exercise, sleep, stress management.         2) Nutrition:     1- Reduce carbohydrates, no added sugar at all, try to avoid hidden carbohydrates (including breads, pasta, cereals/oatmeal).  Avoid soda, processed carbs and sugars including cookies, candies, donuts, jellies, avoid all added sugar beverages including avoidance of diet soda/Gatorade, Powerade, Marciano-Aid, sweetened ice tea.  Avoid all the sweeteners.    2- Eat vegetables (organic is much better to avoid herbicide/insecticide): including green leafy vegetables, steamed or cooked with healthy oils such as olive oil, avocado oil or coconut oil.  Avoid vegetable oil (sunflower, soy, corn, canola, sufflower, cottonseed or peanut oil).  Include cruciferous vegetables in your diets such as kale, cabbage, cauliflower, [place] sprouts, broccoli, Microgreens    3-Try to focus on fruits with low glycemic index (less amount of fructose sugar), best fruits that are rich with vitamin/minerals as well as antioxidants are berries (you could take up to 1 cup of Berries a day). Avoid fruit juices ( even worse than SODA).     4-Eat healthy fat and meats from grass fed animals (grass fed cow/lamb meat, grass fed chicken/poultry, wild-caught fish and seafood) as well as pasteurized eggs from grass fed chicken    5-when you eat dairy foods, eat as a whole fat with no added sugar, sweetener or flavors.  You could add berries, nuts or seeds into the yogurt.  Avoid skimmed milk/free fat milk/2% milk    6-Eat when you feel hungry. Avoid meals if you don't feel hungry.    7-1 big glass of water,  mix  with lemon, add 1-2 tablespoon of apple cider vinegar as well as 2 cloves of garlic>> very helpful    8-INTERMITTENT FASTING is very very very powerful           3) Cardiovascular Exercise: The [facility] disease control recommends a minimum of 150 minutes per week of moderate intensity cardiovascular exercise for weight maintenance and cardiovascular health.  Set this as your initial goal, with at least 30 minutes per session. Types of exercise can include 30 minutes of elliptical, 30 minutes of decently fast jog, 30 minutes of swimming, 30 minutes of heavy gardening (lifting big bags of fertilizer, digging deep holes/ditches).  You can cut down the minute requirements to half, by doing higher intensity sports such as a game of tennis, or soccer.        4) Sleep:    A) Goal: Obtain a minimum of 7-8hours of continuous, uninterrupted, restful sleep per night.    B) Tips for Sleep Hygiene:    I) Go to bed and wake up at consistent times whether work/school day or not.   II) Keep room dark, quiet, and comfortable.  Increase exposure to sunlight during awake times and avoid bright lights (especially anything with a backlight) at least the last 1-2hours before going to sleep.     III) Avoid stimulant or caffeine use in the evening     5) Stress Management: You cannot change the stresses of life necessarily, but you can change how he responds to them. One good way to manage stress is to write things down in order to help you process how to approach things in general or specifically. Another good way is to talk it out with someone you trusts, specifically your significant other or good friend. A definite great way to deal with stress is to have cardiovascular exercise!

## 2021-02-04 ENCOUNTER — NON-PROVIDER VISIT (OUTPATIENT)
Dept: MEDICAL GROUP | Facility: PHYSICIAN GROUP | Age: 66
End: 2021-02-04
Payer: COMMERCIAL

## 2021-02-04 PROCEDURE — 99401 PREV MED CNSL INDIV APPRX 15: CPT | Performed by: INTERNAL MEDICINE

## 2021-02-04 NOTE — Clinical Note
Dr. Bang,    His A1c has decreased from in the 12's to the 8's.  Unfortunately, his insurance is changing and he will not be able to see us starting in April.  I will try to optimize his Trulicity and possibly add pioglitazone at the next appointment depending on his fasting glucose numbers and tolerability to the increased Trulicity dose.    Thanks  Joesph

## 2021-02-04 NOTE — PROGRESS NOTES
Patient Consult Note    TIME IN: 900 AM  TIME OUT: 917am    Primary care physician: Alvin Esquivel M.D.    Reason for consult: Management of Uncontrolled Type 2 Diabetes    HPI:  Jerome Laura is a 65 y.o. old patient who comes in today for evaluation of above stated problem.    Most Recent HbA1c:   Lab Results   Component Value Date/Time    HBA1C 8.6 (H) 01/28/2021 10:41 AM      Results for JEROME LAURA (MRN [id number]) as of 2/4/2021 09:26   Ref. Range 10/22/2020 11:35 1/28/2021 10:41   Glycohemoglobin Latest Ref Range: 0.0 - 5.6 % 11.8 (H) 8.6 (H)   Results for JEROME LAURA (MRN [id number]) as of 2/4/2021 09:26   Ref. Range 8/20/2018 10:41   Glycohemoglobin Latest Ref Range: 0.0 - 5.6 % 12.1 (H)         Lab Results   Component Value Date/Time    CREATININE 1.04 10/22/2020 11:35 AM            Diabetes Medication History and Current Regimen  · Metformin: Metformin ER 1000 twice daily  · GLP-1 Agent: Dulaglutide 1.5 mg once weekly  · SGLT-2 Inhibitor:  Empagliflozin 25 mg once daily     Pt has home glucometer and proper testing technique - yes    Pt reports blood sugars:   Before Breakfast: 150ish     Hypoglycemia awareness - yes  Nocturnal hypoglycemia- no  Hypoglycemia:  None    Pt's treatment of Hypoglycemia    - 15:15 Rule discussed with patient    Current Exercise -he tries to exercise as frequently as possible.  He goes on walks around his neighborhood.  Exercise Goal -30 minutes at least 5 times a week.    Dietary -he is eating more junk food around the holidays.  He says his diet will improve once the holidays are over.    Foot Exam:  Visual Inspection: Feet without maceration, ulcers, fissures.  Feet dry.  Pedal pulses: intact bilaterally  Complains of rashes on the side of his legs.  He has been using a steroid cream.  Small patches almost look like plaques of eczema.  Outside the scope of my practice, defer to PCP    Preventative Management  BP regimen (ACE/ARB) -lisinopril 10 mg  daily      ASA -no      Statin -Lipitor 10 mg    Cardiovascular   Patient Type, check all that apply:   Primary Prevention        Lab Results   Component Value Date/Time    CHOLSTRLTOT 242 (H) 10/22/2020 11:35 AM     (H) 10/22/2020 11:35 AM    HDL 31 (A) 10/22/2020 11:35 AM    TRIGLYCERIDE 218 (H) 10/22/2020 11:35 AM       Lab Results   Component Value Date/Time    SODIUM 130 (L) 10/22/2020 11:35 AM    POTASSIUM 5.0 10/22/2020 11:35 AM    CHLORIDE 96 10/22/2020 11:35 AM    CO2 25 10/22/2020 11:35 AM    GLUCOSE 483 (H) 10/22/2020 11:35 AM    BUN 21 10/22/2020 11:35 AM    CREATININE 1.04 10/22/2020 11:35 AM     Lab Results   Component Value Date/Time    ALKPHOSPHAT 73 10/22/2020 11:35 AM    ASTSGOT 13 10/22/2020 11:35 AM    ALTSGPT 19 10/22/2020 11:35 AM    TBILIRUBIN 0.3 10/22/2020 11:35 AM        Last Retinal Scan -sees a ophthalmologist.  He is up-to-date, but gets shots in his eyes.   Last Foot Exam -regularly examines feet.  No problems to report.  Last A1c -   Lab Results   Component Value Date/Time    HBA1C 8.6 (H) 01/28/2021 10:41 AM        Last Microalbuminuria -   Results for ASUNCION LAURA (MRN [id number]) as of 12/10/2020 10:29   Ref. 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   • Retinopathy of right eye 08/29/2016   • Microalbuminuria 09/28/2015   • Hyperlipidemia 06/19/2014   • Asthma 06/19/2014   • Wears hearing aid 06/19/2014       Past Surgical History:  Past Surgical History:   Procedure Laterality Date   •  TONSILLECTOMY AND ADENOIDECTOMY     • VASECTOMY         Allergies:  Penicillin g    Social History:  Social History     Socioeconomic History   • Marital status:      Spouse name: Not on file   • Number of children: Not on file   • Years of education: Not on file   • Highest education level: Not on file   Occupational History   • Not on file   Social Needs   • Financial resource strain: Not on file   • Food insecurity     Worry: Not on file     Inability: Not on file   • Transportation needs     Medical: Not on file     Non-medical: Not on file   Tobacco Use   • Smoking status: Never Smoker   • Smokeless tobacco: Never Used   • Tobacco comment: avoid all tobacco products   Substance and Sexual Activity   • Alcohol use: No     Alcohol/week: 0.0 oz   • Drug use: No   • Sexual activity: Yes     Partners: Female   Lifestyle   • Physical activity     Days per week: Not on file     Minutes per session: Not on file   • Stress: Not on file   Relationships   • Social connections     Talks on phone: Not on file     Gets together: Not on file     Attends [employer] service: Not on file     Active member of club or organization: Not on file     Attends meetings of clubs or organizations: Not on file     Relationship status: Not on file   • Intimate partner violence     Fear of current or ex partner: Not on file     Emotionally abused: Not on file     Physically abused: Not on file     Forced sexual activity: Not on file   Other Topics Concern   • Not on file   Social History Narrative   • Not on file       Family History:  Family History   Problem Relation Age of Onset   • Asthma Mother    • Diabetes Father    • Psychiatric Illness Father         dementia   • Heart Disease Neg Hx    • Stroke Neg Hx    • Cancer Neg Hx        Medications:    Current Outpatient Medications:   •  Dulaglutide 3 MG/0.5ML Solution Pen-injector, Inject 0.5 mL under the skin every 7 days., Disp: 2 mL, Rfl: 6  •  atorvastatin (LIPITOR) 10 MG Tab,  Take 1 Tab by mouth every evening., Disp: 90 Tab, Rfl: 0  •  tamsulosin (FLOMAX) 0.4 MG capsule, Take 1 Cap by mouth 1/2 hour after breakfast., Disp: 90 Cap, Rfl: 1  •  Cholecalciferol (VITAMIN D3) 2000 UNIT Cap, Take 1 Cap by mouth every day., Disp: 90 Cap, Rfl: 1  •  magnesium oxide (MAG-OX) 400 MG Tab tablet, Take 400 mg by mouth., Disp: , Rfl:   •  dorzolamide-timolol (COSOPT) 22.3-6.8 MG/ML Solution, INSTILL 1 DROP INTO EACH EYE TWICE DAILY, Disp: , Rfl:   •  Empagliflozin 25 MG Tab, Take 1 Each by mouth every day., Disp: 60 Tab, Rfl: 1  •  lisinopril (PRINIVIL) 10 MG Tab, Take 1 Tab by mouth every day., Disp: 90 Tab, Rfl: 3  •  Mometasone Furoate (ASMANEX, 120 METERED DOSES, INH), Inhale 2 Puffs by mouth every day., Disp: , Rfl:   •  metformin ER modified (GLUMETZA) 1000 MG TABLET SR 24 HR, Take 1 Tab by mouth 2 Times a Day., Disp: 180 Tab, Rfl: 3  •  albuterol (VENTOLIN HFA) 108 (90 Base) MCG/ACT Aero Soln inhalation aerosol, INHALE TWO PUFFS BY MOUTH EVERY 6 HOURS AS NEEDED FOR SHORTNESS OF BREATH, Disp: 3 Inhaler, Rfl: 3  •  triamcinolone (TRIESENCE) 40 MG/ML Suspension, by Intramuscular route., Disp: , Rfl:     Labs: Reviewed    Physical Examination:  Vital signs: There were no vitals taken for this visit. There is no height or weight on file to calculate BMI.    Assessment and Plan  -A1c has improved drastically since last appointment.  -Increase Trulicity from 1.5 mg to 3 mg  -He has changed his insurance and will not be able to see us starting in April  -Try to optimize therapy prior to his insurance change  -He complains of mild nausea with Trulicity, but we will still try to increase the dose as tolerated  -Consider adding Actos if Trulicity is not tolerated  -Upon physical exam today, he noted small areas of eczema about the size of a quarter or 2 on each leg.  He has been using a steroid cream.  No s/s of infection.  He will discuss with primary care provider the next appointment.    1. DM2    -  Medication   · Metformin: Metformin ER 1000 twice daily  · Increase GLP-1 Agent: Dulaglutide 3 mg once weekly  · SGLT-2 Inhibitor:  Empagliflozin 25 mg once daily     -Blood glucose and A1c target    • However, more aggressive diabetic control is reasonable when tolerated.    2.  Cardiovascular  -He is due for a lipid panel  -LDLs not at goal    - Lifestyle changes     · Focus on eating a Mediterranean-style diet, as seen above  · Exercise 30 minutes daily at least 5 days/week, as tolerated.      Follow-up appointment in 4 weeks with PCP then 5 weeks afterwards with me    Jese Key, PharmD, MS, BCACP, C    [facility]  Phone [phone] fax [phone]    This note was created using voice recognition software (Dragon). The accuracy of the dictation is limited by the abilities of the software. I have reviewed the note prior to signing, however some errors in grammar and context are still possible. If you have any questions related to this note please do not hesitate to contact our office.     12/10/20    CC:   Alvin Esquivel M.D.  No ref. provider found

## 2021-02-14 DIAGNOSIS — E11.29 TYPE 2 DIABETES MELLITUS WITH ALBUMINURIA (HCC): ICD-10-CM

## 2021-02-14 DIAGNOSIS — R80.9 TYPE 2 DIABETES MELLITUS WITH ALBUMINURIA (HCC): ICD-10-CM

## 2021-02-16 RX ORDER — EMPAGLIFLOZIN 25 MG/1
TABLET, FILM COATED ORAL
Qty: 90 TABLET | Refills: 0 | Status: SHIPPED | OUTPATIENT
Start: 2021-02-16 | End: 2021-05-18

## 2021-02-22 DIAGNOSIS — J45.40 MODERATE PERSISTENT ASTHMA WITHOUT COMPLICATION: ICD-10-CM

## 2021-02-23 RX ORDER — ALBUTEROL SULFATE 90 UG/1
AEROSOL, METERED RESPIRATORY (INHALATION)
Qty: 3 EACH | Refills: 0 | Status: SHIPPED | OUTPATIENT
Start: 2021-02-23 | End: 2021-06-01

## 2021-03-03 DIAGNOSIS — Z23 NEED FOR VACCINATION: ICD-10-CM

## 2021-03-16 RX ORDER — MAGNESIUM OXIDE 400 MG/1
400 TABLET ORAL DAILY
Qty: 90 TABLET | Refills: 1 | Status: SHIPPED | OUTPATIENT
Start: 2021-03-16 | End: 2021-08-18

## 2021-03-18 DIAGNOSIS — E11.59 HYPERTENSION ASSOCIATED WITH DIABETES (HCC): ICD-10-CM

## 2021-03-18 DIAGNOSIS — I15.2 HYPERTENSION ASSOCIATED WITH DIABETES (HCC): ICD-10-CM

## 2021-03-19 RX ORDER — LISINOPRIL 10 MG/1
10 TABLET ORAL DAILY
Qty: 90 TABLET | Refills: 1 | Status: SHIPPED | OUTPATIENT
Start: 2021-03-19 | End: 2021-08-18 | Stop reason: SDUPTHER

## 2021-03-19 NOTE — TELEPHONE ENCOUNTER
Pt met protocol?: Yes pt last ov 2/2021   BP Readings from Last 1 Encounters:   02/03/21 118/76     Lab Results   Component Value Date/Time    SODIUM 130 (L) 10/22/2020 11:35 AM    POTASSIUM 5.0 10/22/2020 11:35 AM    CHLORIDE 96 10/22/2020 11:35 AM    CO2 25 10/22/2020 11:35 AM    GLUCOSE 483 (H) 10/22/2020 11:35 AM    BUN 21 10/22/2020 11:35 AM    CREATININE 1.04 10/22/2020 11:35 AM

## 2021-04-20 RX ORDER — ATORVASTATIN CALCIUM 10 MG/1
TABLET, FILM COATED ORAL
Qty: 90 TABLET | Refills: 1 | Status: SHIPPED | OUTPATIENT
Start: 2021-04-20 | End: 2021-08-18

## 2021-04-20 NOTE — TELEPHONE ENCOUNTER
Pt has had OV within the 12 month protocol and lipid panel is current. 6 month supply sent to pharmacy. Pt started back on statin 2/21.   Lab Results   Component Value Date/Time    CHOLSTRLTOT 242 (H) 10/22/2020 11:35 AM     (H) 10/22/2020 11:35 AM    HDL 31 (A) 10/22/2020 11:35 AM    TRIGLYCERIDE 218 (H) 10/22/2020 11:35 AM       Lab Results   Component Value Date/Time    SODIUM 130 (L) 10/22/2020 11:35 AM    POTASSIUM 5.0 10/22/2020 11:35 AM    CHLORIDE 96 10/22/2020 11:35 AM    CO2 25 10/22/2020 11:35 AM    GLUCOSE 483 (H) 10/22/2020 11:35 AM    BUN 21 10/22/2020 11:35 AM    CREATININE 1.04 10/22/2020 11:35 AM     Lab Results   Component Value Date/Time    ALKPHOSPHAT 73 10/22/2020 11:35 AM    ASTSGOT 13 10/22/2020 11:35 AM    ALTSGPT 19 10/22/2020 11:35 AM    TBILIRUBIN 0.3 10/22/2020 11:35 AM

## 2021-05-05 ENCOUNTER — TELEPHONE (OUTPATIENT)
Dept: MEDICAL GROUP | Facility: PHYSICIAN GROUP | Age: 66
End: 2021-05-05

## 2021-05-05 ENCOUNTER — TELEPHONE (OUTPATIENT)
Dept: URGENT CARE | Facility: PHYSICIAN GROUP | Age: 66
End: 2021-05-05

## 2021-05-05 DIAGNOSIS — R80.9 TYPE 2 DIABETES MELLITUS WITH ALBUMINURIA (HCC): ICD-10-CM

## 2021-05-05 DIAGNOSIS — E11.29 TYPE 2 DIABETES MELLITUS WITH ALBUMINURIA (HCC): ICD-10-CM

## 2021-05-05 RX ORDER — METFORMIN HYDROCHLORIDE 1000 MG/1
1 TABLET, FILM COATED, EXTENDED RELEASE ORAL 2 TIMES DAILY
Qty: 180 TABLET | Refills: 0 | Status: SHIPPED | OUTPATIENT
Start: 2021-05-05 | End: 2021-08-03 | Stop reason: DRUGHIGH

## 2021-05-28 DIAGNOSIS — J45.40 MODERATE PERSISTENT ASTHMA WITHOUT COMPLICATION: ICD-10-CM

## 2021-06-01 RX ORDER — ALBUTEROL SULFATE 90 UG/1
AEROSOL, METERED RESPIRATORY (INHALATION)
Qty: 3 EACH | Refills: 1 | Status: SHIPPED | OUTPATIENT
Start: 2021-06-01 | End: 2023-02-09 | Stop reason: SDUPTHER

## 2021-07-06 ENCOUNTER — TELEPHONE (OUTPATIENT)
Dept: MEDICAL GROUP | Facility: PHYSICIAN GROUP | Age: 66
End: 2021-07-06

## 2021-07-06 ENCOUNTER — TELEPHONE (OUTPATIENT)
Dept: VASCULAR LAB | Facility: MEDICAL CENTER | Age: 66
End: 2021-07-06

## 2021-07-06 DIAGNOSIS — R80.9 TYPE 2 DIABETES MELLITUS WITH ALBUMINURIA (HCC): ICD-10-CM

## 2021-07-06 DIAGNOSIS — E11.29 TYPE 2 DIABETES MELLITUS WITH ALBUMINURIA (HCC): ICD-10-CM

## 2021-07-06 RX ORDER — DULAGLUTIDE 0.75 MG/.5ML
0.5 INJECTION, SOLUTION SUBCUTANEOUS
Qty: 1.96 ML | Refills: 6 | Status: SHIPPED | OUTPATIENT
Start: 2021-07-06 | End: 2021-08-03 | Stop reason: DRUGHIGH

## 2021-07-06 RX ORDER — DULAGLUTIDE 1.5 MG/.5ML
0.5 INJECTION, SOLUTION SUBCUTANEOUS
Qty: 6 ML | Refills: 6 | Status: SHIPPED | OUTPATIENT
Start: 2021-07-06 | End: 2021-07-06

## 2021-07-06 NOTE — TELEPHONE ENCOUNTER
1. Caller Name: Jerome                        Call Back Number: [phone] (home)         How would the patient prefer to be contacted with a response: Phone call OK to leave a detailed message    2. What are the patient's symptoms (location & severity)? diarrhea    3. Is this a new symptom: Experiencing diarrhea on & off, thinks its due to the Trulicity that it may be to strong. Would like to know if he could get it decreased.    4. When did it start? 1 month     5. Action taken per Active Symptom Guide: Same day appointment scheduled    6. Patient agrees to recommended action per Active Symptom Escalation Protocol.  1 month-diarrhea, trulicity

## 2021-07-06 NOTE — TELEPHONE ENCOUNTER
Spoke to Jerome today.  He was having severe diarrhea with Trulicity 3 mg.  As such, we will decrease his dose to 0.75 mg every 7 days.  Sent in a new prescription to [facility].  He will contact us with any concerns.  Also scheduled him a follow-up appointment in 2 weeks.

## 2021-07-22 ENCOUNTER — TELEPHONE (OUTPATIENT)
Dept: MEDICAL GROUP | Facility: PHYSICIAN GROUP | Age: 66
End: 2021-07-22

## 2021-07-22 DIAGNOSIS — R80.9 MICROALBUMINURIA: ICD-10-CM

## 2021-07-22 DIAGNOSIS — E55.9 VITAMIN D DEFICIENCY: ICD-10-CM

## 2021-07-22 DIAGNOSIS — R80.9 TYPE 2 DIABETES MELLITUS WITH ALBUMINURIA (HCC): ICD-10-CM

## 2021-07-22 DIAGNOSIS — E78.2 MIXED HYPERLIPIDEMIA: ICD-10-CM

## 2021-07-22 DIAGNOSIS — E11.29 TYPE 2 DIABETES MELLITUS WITH ALBUMINURIA (HCC): ICD-10-CM

## 2021-07-22 NOTE — TELEPHONE ENCOUNTER
Advised patient that fasting labs have been ordered for him. Patient plans to complete his labs on Monday and will schedule his follow up appointment when he comes and gets his lab work done.

## 2021-07-22 NOTE — TELEPHONE ENCOUNTER
Pt called asking if there are any labs that he needs to have done. There are no open orders in his chart. Pt was informed that there are no open lab orders, but he wanted me to check with you. Does pt need to have any labs done before his next appointment? Please advise

## 2021-08-02 ENCOUNTER — HOSPITAL ENCOUNTER (OUTPATIENT)
Dept: LAB | Facility: MEDICAL CENTER | Age: 66
End: 2021-08-02
Attending: INTERNAL MEDICINE
Payer: MEDICARE

## 2021-08-02 DIAGNOSIS — E55.9 VITAMIN D DEFICIENCY: ICD-10-CM

## 2021-08-02 DIAGNOSIS — R80.9 TYPE 2 DIABETES MELLITUS WITH ALBUMINURIA (HCC): ICD-10-CM

## 2021-08-02 DIAGNOSIS — E11.29 TYPE 2 DIABETES MELLITUS WITH ALBUMINURIA (HCC): ICD-10-CM

## 2021-08-02 LAB
25(OH)D3 SERPL-MCNC: 45 NG/ML (ref 30–100)
ALBUMIN SERPL BCP-MCNC: 4 G/DL (ref 3.2–4.9)
ALBUMIN/GLOB SERPL: 1.6 G/DL
ALP SERPL-CCNC: 53 U/L (ref 30–99)
ALT SERPL-CCNC: 8 U/L (ref 2–50)
ANION GAP SERPL CALC-SCNC: 11 MMOL/L (ref 7–16)
AST SERPL-CCNC: 6 U/L (ref 12–45)
BILIRUB SERPL-MCNC: 0.4 MG/DL (ref 0.1–1.5)
BUN SERPL-MCNC: 13 MG/DL (ref 8–22)
CALCIUM SERPL-MCNC: 9.2 MG/DL (ref 8.5–10.5)
CHLORIDE SERPL-SCNC: 103 MMOL/L (ref 96–112)
CO2 SERPL-SCNC: 24 MMOL/L (ref 20–33)
CREAT SERPL-MCNC: 1 MG/DL (ref 0.5–1.4)
CREAT UR-MCNC: 29.41 MG/DL
EST. AVERAGE GLUCOSE BLD GHB EST-MCNC: 160 MG/DL
GLOBULIN SER CALC-MCNC: 2.5 G/DL (ref 1.9–3.5)
GLUCOSE SERPL-MCNC: 161 MG/DL (ref 65–99)
HBA1C MFR BLD: 7.2 % (ref 4–5.6)
MICROALBUMIN UR-MCNC: 4.3 MG/DL
MICROALBUMIN/CREAT UR: 146 MG/G (ref 0–30)
POTASSIUM SERPL-SCNC: 4.2 MMOL/L (ref 3.6–5.5)
PROT SERPL-MCNC: 6.5 G/DL (ref 6–8.2)
SODIUM SERPL-SCNC: 138 MMOL/L (ref 135–145)
VIT B12 SERPL-MCNC: 437 PG/ML (ref 211–911)

## 2021-08-02 PROCEDURE — 80053 COMPREHEN METABOLIC PANEL: CPT

## 2021-08-02 PROCEDURE — 82043 UR ALBUMIN QUANTITATIVE: CPT

## 2021-08-02 PROCEDURE — 36415 COLL VENOUS BLD VENIPUNCTURE: CPT

## 2021-08-02 PROCEDURE — 82607 VITAMIN B-12: CPT

## 2021-08-02 PROCEDURE — 82570 ASSAY OF URINE CREATININE: CPT

## 2021-08-02 PROCEDURE — 80061 LIPID PANEL: CPT | Mod: XU

## 2021-08-02 PROCEDURE — 82306 VITAMIN D 25 HYDROXY: CPT

## 2021-08-02 PROCEDURE — 83036 HEMOGLOBIN GLYCOSYLATED A1C: CPT | Mod: GA

## 2021-08-02 PROCEDURE — 83704 LIPOPROTEIN BLD QUAN PART: CPT

## 2021-08-03 ENCOUNTER — DOCUMENTATION (OUTPATIENT)
Dept: VASCULAR LAB | Facility: MEDICAL CENTER | Age: 66
End: 2021-08-03

## 2021-08-03 DIAGNOSIS — R80.9 TYPE 2 DIABETES MELLITUS WITH ALBUMINURIA (HCC): Primary | ICD-10-CM

## 2021-08-03 DIAGNOSIS — E11.29 TYPE 2 DIABETES MELLITUS WITH ALBUMINURIA (HCC): Primary | ICD-10-CM

## 2021-08-03 RX ORDER — DULAGLUTIDE 1.5 MG/.5ML
0.5 INJECTION, SOLUTION SUBCUTANEOUS
Qty: 12 EACH | Refills: 1 | Status: SHIPPED | OUTPATIENT
Start: 2021-08-03 | End: 2021-08-18 | Stop reason: SDUPTHER

## 2021-08-03 RX ORDER — METFORMIN HYDROCHLORIDE 500 MG/1
500 TABLET, EXTENDED RELEASE ORAL 2 TIMES DAILY
Qty: 180 TABLET | Refills: 1 | Status: SHIPPED | OUTPATIENT
Start: 2021-08-03 | End: 2021-08-18

## 2021-08-03 NOTE — PROGRESS NOTES
[facility]    Pt called to discuss medications in light of recent changes.  Pt reports his diarrhea has improved since decrease of Trulicity to 0.75mg dose  Pt still has some GI discomfort and has self-reduced metformin to 500mg twice daily - pt has been splitting 1000mg extended release tablets.  - Explained the ER tablets are not designed to be split - will send new order of 500mg ER tablets to achieve maximally tolerated dose.    Pt also willing to increase Trulicity to 1.5mg once week at this time to assess tolerability - new order sent to pharmacy.       Preet Saavedra, PharmD, [facility] at [phone], fax [phone]

## 2021-08-04 LAB
CHOLEST SERPL-MCNC: 204 MG/DL
HDL PARTICAL NO Q4363: 25.8 UMOL/L
HDL SIZE Q4361: 8.4 NM
HDLC SERPL-MCNC: 36 MG/DL (ref 40–59)
HLD.LARGE SERPL-SCNC: <2.8 UMOL/L
L VLDL PART NO Q4357: 2.5 NMOL/L
LDL SERPL QN: 20.5 NM
LDL SERPL-SCNC: 1674 NMOL/L
LDL SMALL SERPL-SCNC: 993 NMOL/L
LDLC SERPL CALC-MCNC: 144 MG/DL
PATHOLOGY STUDY: ABNORMAL
TRIGL SERPL-MCNC: 121 MG/DL (ref 30–149)
VLDL SIZE Q4362: 46.2 NM

## 2021-08-18 ENCOUNTER — OFFICE VISIT (OUTPATIENT)
Dept: MEDICAL GROUP | Facility: PHYSICIAN GROUP | Age: 66
End: 2021-08-18
Payer: MEDICARE

## 2021-08-18 VITALS
DIASTOLIC BLOOD PRESSURE: 74 MMHG | BODY MASS INDEX: 23.99 KG/M2 | HEART RATE: 72 BPM | SYSTOLIC BLOOD PRESSURE: 110 MMHG | TEMPERATURE: 98.1 F | HEIGHT: 65 IN | OXYGEN SATURATION: 98 % | WEIGHT: 144 LBS

## 2021-08-18 DIAGNOSIS — E78.2 MIXED HYPERLIPIDEMIA: ICD-10-CM

## 2021-08-18 DIAGNOSIS — R80.9 MICROALBUMINURIA: ICD-10-CM

## 2021-08-18 DIAGNOSIS — R39.9 LOWER URINARY TRACT SYMPTOMS (LUTS): ICD-10-CM

## 2021-08-18 DIAGNOSIS — N40.0 BENIGN PROSTATIC HYPERPLASIA WITHOUT LOWER URINARY TRACT SYMPTOMS: ICD-10-CM

## 2021-08-18 DIAGNOSIS — R80.9 TYPE 2 DIABETES MELLITUS WITH ALBUMINURIA (HCC): ICD-10-CM

## 2021-08-18 DIAGNOSIS — E11.59 HYPERTENSION ASSOCIATED WITH DIABETES (HCC): ICD-10-CM

## 2021-08-18 DIAGNOSIS — E55.9 VITAMIN D DEFICIENCY: ICD-10-CM

## 2021-08-18 DIAGNOSIS — I15.2 HYPERTENSION ASSOCIATED WITH DIABETES (HCC): ICD-10-CM

## 2021-08-18 DIAGNOSIS — E11.29 TYPE 2 DIABETES MELLITUS WITH ALBUMINURIA (HCC): ICD-10-CM

## 2021-08-18 PROCEDURE — 99214 OFFICE O/P EST MOD 30 MIN: CPT | Performed by: INTERNAL MEDICINE

## 2021-08-18 RX ORDER — PREDNISOLONE ACETATE 10 MG/ML
SUSPENSION/ DROPS OPHTHALMIC
COMMUNITY
Start: 2021-08-03 | End: 2021-10-12

## 2021-08-18 RX ORDER — EMPAGLIFLOZIN 25 MG/1
TABLET, FILM COATED ORAL
Qty: 90 TABLET | Refills: 1 | Status: SHIPPED | OUTPATIENT
Start: 2021-08-18 | End: 2022-04-07

## 2021-08-18 RX ORDER — TIMOLOL MALEATE 5 MG/ML
1 SOLUTION/ DROPS OPHTHALMIC DAILY
COMMUNITY
End: 2023-11-22

## 2021-08-18 RX ORDER — ACETAMINOPHEN 160 MG
1 TABLET,DISINTEGRATING ORAL DAILY
Qty: 90 CAPSULE | Refills: 3 | Status: SHIPPED | OUTPATIENT
Start: 2021-08-18 | End: 2023-11-22

## 2021-08-18 RX ORDER — DULAGLUTIDE 1.5 MG/.5ML
0.5 INJECTION, SOLUTION SUBCUTANEOUS
Qty: 12 EACH | Refills: 1 | Status: SHIPPED | OUTPATIENT
Start: 2021-08-18 | End: 2021-11-22 | Stop reason: SDUPTHER

## 2021-08-18 RX ORDER — METFORMIN HYDROCHLORIDE 500 MG/1
500 TABLET, EXTENDED RELEASE ORAL 2 TIMES DAILY
Qty: 180 TABLET | Refills: 1 | Status: CANCELLED | OUTPATIENT
Start: 2021-08-18

## 2021-08-18 RX ORDER — LISINOPRIL 10 MG/1
10 TABLET ORAL DAILY
Qty: 90 TABLET | Refills: 1 | Status: SHIPPED | OUTPATIENT
Start: 2021-08-18 | End: 2021-11-22 | Stop reason: SDUPTHER

## 2021-08-18 RX ORDER — METFORMIN HYDROCHLORIDE 750 MG/1
750 TABLET, EXTENDED RELEASE ORAL 2 TIMES DAILY
Qty: 180 TABLET | Refills: 3 | Status: ON HOLD | OUTPATIENT
Start: 2021-08-18 | End: 2021-10-13

## 2021-08-18 RX ORDER — TAMSULOSIN HYDROCHLORIDE 0.4 MG/1
0.4 CAPSULE ORAL
Qty: 90 CAPSULE | Refills: 1 | Status: SHIPPED | OUTPATIENT
Start: 2021-08-18 | End: 2022-03-09

## 2021-08-18 NOTE — PATIENT INSTRUCTIONS
Atorvastatin ( Lipitor) 10 mg daily for now.     Natural vitamins from whole foods ( mainly from vegetables)   Active vitamin B complex supplement (methylcobalamin B12, methyl folate B9).   Magnesium glycinate supplement  400 mg daily  Vitamin D3 with K2 supplement   Essential oil supplement (cod liver oil)  Turmeric, louise, Boswellia, black pepper, Cinnamon combination supplement   CoQ10 100 mg daily

## 2021-08-18 NOTE — PROGRESS NOTES
Established Patient    Chief Complaint   Patient presents with   • Lab Results       Subjective:     HPI:   Jerome presents today with the following.    1. 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[facility])  A1c:   Lab Results   Component Value Date/Time    HBA1C 7.2 (H) 08/02/2021 0750    HBA1C 8.6 (H) 01/28/2021 1041    HBA1C 11.8 (H) 10/22/2020 1135    AVGLUC 160 08/02/2021 0750    AVGLUC 200 01/28/2021 1041    AVGLUC 292 10/22/2020 1135   A1c improving, patient is trying to eat healthier option, trying to reduce sugar and refined carbs as well, patient also would like to have refill for medication  -Patient was not able to tolerate Trulicity 3 mg weekly because of severe diarrhea according to him, now he is currently on 1.5 mg weekly with no GI symptoms, he is also currently taking Metformin 1000 twice daily with no issues, also compliant on Jardiance 25 mg daily    5. Mixed hyperlipidemia  Patient has elevated LDL level, he is not on statin, he would like to restart Lipitor 10 mg for now and see if there is any reaction or side effect as he had before for muscle cramps possibly from lovastatin but patient is not sure    Patient Active Problem List    Diagnosis Date Noted   • Benign prostatic hyperplasia without lower urinary tract symptoms 08/18/2021   • Vitamin D deficiency 12/28/2020   • Muscle cramps 11/02/2020   • Hypertension associated with diabetes ([facility]) 03/09/2020   • 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[facility]) 10/20/2016   • Retinopathy of right eye 08/29/2016   • Microalbuminuria 09/28/2015   • Hyperlipidemia 06/19/2014   • Asthma 06/19/2014   • Wears hearing aid 06/19/2014       Current Outpatient Medications on File Prior to Visit   Medication Sig Dispense Refill   • albuterol 108 (90 Base) MCG/ACT Aero Soln inhalation aerosol USE 2 INHALATIONS EVERY 6 HOURS AS NEEDED FOR SHORTNESS OF BREATH 3 Each 1   • prednisoLONE acetate (PRED FORTE) 1 % Suspension INSTILL 1 DROP INTO EACH EYE 4 TIMES DAILY    "  • timolol (TIMOPTIC) 0.5 % Solution INSTILL 1 DROP INTO EACH EYE ONCE DAILY IN THE MORNING     • dorzolamide-timolol (COSOPT) 22.3-6.8 MG/ML Solution INSTILL 1 DROP INTO EACH EYE TWICE DAILY     • Mometasone Furoate (ASMANEX, 120 METERED DOSES, INH) Inhale 2 Puffs by mouth every day.     • triamcinolone (TRIESENCE) 40 MG/ML Suspension by Intramuscular route.       No current facility-administered medications on file prior to visit.       Allergies, past medical history, past surgical history, family history, social history reviewed and updated    ROS:  All other systems reviewed and are negative except as stated in the HPI     Physical Exam:     /74 (BP Location: Left arm, Patient Position: Sitting, BP Cuff Size: Adult)   Pulse 72   Temp 36.7 °C (98.1 °F) (Temporal)   Ht 1.651 m (5' 5\")   Wt 65.3 kg (144 lb)   SpO2 98%   BMI 23.96 kg/m²   General: Normal appearing. No distress.  ENT: oropharynx without exudates.    Eyes: conjunctiva clear lids without ptosis  Pulmonary: Clear to ausculation.  Normal effort.   Cardiovascular: Regular rate and rhythm  Abdomen: Soft, nontender,  Lymph: No cervical or supraclavicular palpable lymph nodes  Psych: Normal mood and affect.     I have reviewed pertinent labs and diagnostic tests associated with this visit with specific comments listed under the assessment and plan below      Assessment and Plan:     65 y.o. male with the following issues.    1. 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  Dispense: 12 Each; Refill: 1  - Empagliflozin (JARDIANCE) 25 MG Tab; TAKE 1 TABLET DAILY  Dispense: 90 Tablet; Refill: 1  - metFORMIN ER (GLUCOPHAGE XR) 750 MG TABLET SR 24 HR; Take 1 Tablet by mouth 2 times a day.  Dispense: 180 Tablet; Refill: 3    -Discussed diabetic diet in detail, educated regarding management of hypoglycemia, advised regular exercise, educated " disease management and weight goals as well as feet care and frequent check of feet.  -Patient to check early morning fasting blood glucose with goal discussed.  - asked to have a BG log with daily fasting and 2 hr postprandial after biggest meal and bring to next visit or send through my chart  -Discussed side effects of medication. Patient confirmed understanding of information.    5. Mixed hyperlipidemia  -Advised to start Lipitor 10 mg daily for now and see if there is any side effect/reaction      Follow Up:      Return in about 3 months (around 11/18/2021).  Diabetes, A1c,  Please note that this dictation was created using voice recognition software. I have made every reasonable attempt to correct obvious errors, but I expect that there are errors of grammar and possibly content that I did not discover before finalizing the note.    Signed by: Alvin Esquivel M.D.

## 2021-09-30 ENCOUNTER — NON-PROVIDER VISIT (OUTPATIENT)
Dept: MEDICAL GROUP | Facility: PHYSICIAN GROUP | Age: 66
End: 2021-09-30
Payer: MEDICARE

## 2021-09-30 PROCEDURE — 99211 OFF/OP EST MAY X REQ PHY/QHP: CPT | Performed by: INTERNAL MEDICINE

## 2021-09-30 NOTE — PROGRESS NOTES
Patient Consult Note  09/30/21    Time 15min     Primary care physician: Alvin Esquivel M.D.    Reason for consult: Management of Uncontrolled Type 2 Diabetes    HPI:  Jerome Mendoza is a 65 y.o. old patient who comes in today for evaluation of above stated problem.    Most Recent HbA1c:   Lab Results   Component Value Date/Time    HBA1C 7.2 (H) 08/02/2021 07:50 AM     Lab Results   Component Value Date/Time    CREATININE 1.00 08/02/2021 07:50 AM          Diabetes Medication History and Current Regimen  · Metformin: Metformin ER 1000 twice daily  · GLP-1 Agent: Dulaglutide 1.5 mg once weekly  · SGLT-2 Inhibitor:  Empagliflozin 25 mg once daily     Pt has home glucometer and proper testing technique - yes    Pt reports blood sugars:   Before Breakfast: 150ish     Hypoglycemia awareness - yes  Nocturnal hypoglycemia- no  Hypoglycemia:  None    Pt's treatment of Hypoglycemia    - 15:15 Rule discussed with patient    Current Exercise -he tries to exercise as frequently as possible.  He goes on walks around his neighborhood.  Exercise Goal -30 minutes at least 5 times a week.    Dietary -he is eating more junk food around the holidays.  He says his diet will improve once the holidays are over.    Foot Exam:  Visual Inspection: Feet without maceration, ulcers, fissures.  Feet dry.  Pedal pulses: intact bilaterally  Complains of rashes on the side of his legs.  He has been using a steroid cream.  Small patches almost look like plaques of eczema.  Outside the scope of my practice, defer to PCP    Preventative Management  BP regimen (ACE/ARB) -lisinopril 10 mg daily  ASA -no  Statin -Lipitor 10 mg  Cardiovascular   Patient Type, check all that apply:   Primary Prevention      Lab Results   Component Value Date/Time    CHOLSTRLTOT 204 (H) 08/02/2021 07:50 AM    CHOLSTRLTOT 242 (H) 10/22/2020 11:35 AM     (H) 10/22/2020 11:35 AM    HDL 36 (L) 08/02/2021 07:50 AM    HDL 31 (A) 10/22/2020 11:35 AM    TRIGLYCERIDE  121 08/02/2021 07:50 AM    TRIGLYCERIDE 218 (H) 10/22/2020 11:35 AM       Lab Results   Component Value Date/Time    SODIUM 138 08/02/2021 07:50 AM    POTASSIUM 4.2 08/02/2021 07:50 AM    CHLORIDE 103 08/02/2021 07:50 AM    CO2 24 08/02/2021 07:50 AM    GLUCOSE 161 (H) 08/02/2021 07:50 AM    BUN 13 08/02/2021 07:50 AM    CREATININE 1.00 08/02/2021 07:50 AM     Lab Results   Component Value Date/Time    ALKPHOSPHAT 53 08/02/2021 07:50 AM    ASTSGOT 6 (L) 08/02/2021 07:50 AM    ALTSGPT 8 08/02/2021 07:50 AM    TBILIRUBIN 0.4 08/02/2021 07:50 AM        Last Retinal Scan -sees a ophthalmologist.  He is up-to-date, but gets shots in his eyes.   Last Foot Exam -regularly examines feet.  No problems to report.  Last A1c -   Lab Results   Component Value Date/Time    HBA1C 7.2 (H) 08/02/2021 07:50 AM        Last Microalbuminuria -   Results for ASUNCION LAURA (MRN [id number]) as of 12/10/2020 10:29   Ref. 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   • Retinopathy of right eye 08/29/2016   • Microalbuminuria 09/28/2015   • Hyperlipidemia 06/19/2014   • Asthma 06/19/2014   • Wears hearing aid 06/19/2014       Past Surgical History:  Past Surgical History:   Procedure Laterality Date   • TONSILLECTOMY AND ADENOIDECTOMY     • VASECTOMY         Allergies:  Penicillin g    Social History:  Social History     Socioeconomic History   • Marital status:      Spouse name: Not on file   • Number of children: Not on file   •  Years of education: Not on file   • Highest education level: Not on file   Occupational History   • Not on file   Tobacco Use   • Smoking status: Never Smoker   • Smokeless tobacco: Never Used   • Tobacco comment: avoid all tobacco products   Vaping Use   • Vaping Use: Never used   Substance and Sexual Activity   • Alcohol use: No     Alcohol/week: 0.0 oz   • Drug use: No   • Sexual activity: Yes     Partners: Female   Other Topics Concern   • Not on file   Social History Narrative   • Not on file     Social Determinants of Health     Financial Resource Strain:    • Difficulty of Paying Living Expenses:    Food Insecurity:    • Worried About Running Out of Food in the Last Year:    • Ran Out of Food in the Last Year:    Transportation Needs:    • Lack of Transportation (Medical):    • Lack of Transportation (Non-Medical):    Physical Activity:    • Days of Exercise per Week:    • Minutes of Exercise per Session:    Stress:    • Feeling of Stress :    Social Connections:    • Frequency of Communication with Friends and Family:    • Frequency of Social Gatherings with Friends and Family:    • Attends [employer] Services:    • Active Member of Clubs or Organizations:    • Attends Club or Organization Meetings:    • Marital Status:    Intimate Partner Violence:    • Fear of Current or Ex-Partner:    • Emotionally Abused:    • Physically Abused:    • Sexually Abused:        Family History:  Family History   Problem Relation Age of Onset   • Asthma Mother    • Diabetes Father    • Psychiatric Illness Father         dementia   • Heart Disease Neg Hx    • Stroke Neg Hx    • Cancer Neg Hx        Medications:    Current Outpatient Medications:   •  prednisoLONE acetate (PRED FORTE) 1 % Suspension, INSTILL 1 DROP INTO EACH EYE 4 TIMES DAILY, Disp: , Rfl:   •  timolol (TIMOPTIC) 0.5 % Solution, INSTILL 1 DROP INTO EACH EYE ONCE DAILY IN THE MORNING, Disp: , Rfl:   •  Dulaglutide (TRULICITY) 1.5 MG/0.5ML Solution Pen-injector,  Inject 0.5 mL under the skin every 7 days., Disp: 12 Each, Rfl: 1  •  Empagliflozin (JARDIANCE) 25 MG Tab, TAKE 1 TABLET DAILY, Disp: 90 Tablet, Rfl: 1  •  lisinopril (PRINIVIL) 10 MG Tab, Take 1 Tablet by mouth every day., Disp: 90 Tablet, Rfl: 1  •  tamsulosin (FLOMAX) 0.4 MG capsule, Take 1 Capsule by mouth 1/2 hour after breakfast., Disp: 90 Capsule, Rfl: 1  •  Cholecalciferol (VITAMIN D3) 2000 UNIT Cap, Take 1 Capsule by mouth every day., Disp: 90 Capsule, Rfl: 3  •  metFORMIN ER (GLUCOPHAGE XR) 750 MG TABLET SR 24 HR, Take 1 Tablet by mouth 2 times a day., Disp: 180 Tablet, Rfl: 3  •  albuterol 108 (90 Base) MCG/ACT Aero Soln inhalation aerosol, USE 2 INHALATIONS EVERY 6 HOURS AS NEEDED FOR SHORTNESS OF BREATH, Disp: 3 Each, Rfl: 1  •  dorzolamide-timolol (COSOPT) 22.3-6.8 MG/ML Solution, INSTILL 1 DROP INTO EACH EYE TWICE DAILY, Disp: , Rfl:   •  Mometasone Furoate (ASMANEX, 120 METERED DOSES, INH), Inhale 2 Puffs by mouth every day., Disp: , Rfl:   •  triamcinolone (TRIESENCE) 40 MG/ML Suspension, by Intramuscular route., Disp: , Rfl:     Labs: Reviewed    Physical Examination:  Vital signs: There were no vitals taken for this visit. There is no height or weight on file to calculate BMI.    Assessment and Plan  -A1c has improved drastically since last appointment.  -Difficulty affording Trulicity and Jardiance.  Provided him with patient assistance paperwork  -No change in medication today  - Medication   · Metformin: Metformin ER 1000 twice daily  · GLP-1 Agent: Dulaglutide 1.5 mg once weekly  · SGLT-2 Inhibitor:  Empagliflozin 25 mg once daily     -Blood glucose and A1c target    • However, more aggressive diabetic control is reasonable when tolerated.    2.  Cardiovascular    - Lifestyle changes     · Focus on eating a Mediterranean-style diet, as seen above  · Exercise 30 minutes daily at least 5 days/week, as tolerated.      Follow-up appointment in 4 weeks     Jese Key, PharmD, MS, BCACP,  [facility]  Phone [phone] fax [phone]    This note was created using voice recognition software (Dragon). The accuracy of the dictation is limited by the abilities of the software. I have reviewed the note prior to signing, however some errors in grammar and context are still possible. If you have any questions related to this note please do not hesitate to contact our office.     12/10/20    CC:   Alvin Esquivel M.D.  No ref. provider found

## 2021-10-05 ENCOUNTER — HOSPITAL ENCOUNTER (EMERGENCY)
Facility: MEDICAL CENTER | Age: 66
End: 2021-10-05
Attending: EMERGENCY MEDICINE
Payer: MEDICARE

## 2021-10-05 ENCOUNTER — APPOINTMENT (OUTPATIENT)
Dept: RADIOLOGY | Facility: MEDICAL CENTER | Age: 66
End: 2021-10-05
Attending: EMERGENCY MEDICINE
Payer: MEDICARE

## 2021-10-05 ENCOUNTER — APPOINTMENT (OUTPATIENT)
Dept: RADIOLOGY | Facility: MEDICAL CENTER | Age: 66
End: 2021-10-05
Payer: MEDICARE

## 2021-10-05 VITALS
TEMPERATURE: 98.8 F | HEIGHT: 66 IN | WEIGHT: 140.65 LBS | OXYGEN SATURATION: 93 % | HEART RATE: 89 BPM | BODY MASS INDEX: 22.6 KG/M2 | SYSTOLIC BLOOD PRESSURE: 188 MMHG | RESPIRATION RATE: 20 BRPM | DIASTOLIC BLOOD PRESSURE: 85 MMHG

## 2021-10-05 DIAGNOSIS — U07.1 COVID-19 VIRUS INFECTION: ICD-10-CM

## 2021-10-05 LAB
ALBUMIN SERPL BCP-MCNC: 3.9 G/DL (ref 3.2–4.9)
ALBUMIN/GLOB SERPL: 1.3 G/DL
ALP SERPL-CCNC: 54 U/L (ref 30–99)
ALT SERPL-CCNC: 10 U/L (ref 2–50)
ANION GAP SERPL CALC-SCNC: 17 MMOL/L (ref 7–16)
APPEARANCE UR: CLEAR
AST SERPL-CCNC: 20 U/L (ref 12–45)
BACTERIA #/AREA URNS HPF: NEGATIVE /HPF
BASOPHILS # BLD AUTO: 0 % (ref 0–1.8)
BASOPHILS # BLD: 0 K/UL (ref 0–0.12)
BILIRUB SERPL-MCNC: 0.4 MG/DL (ref 0.1–1.5)
BILIRUB UR QL STRIP.AUTO: NEGATIVE
BUN SERPL-MCNC: 22 MG/DL (ref 8–22)
CALCIUM SERPL-MCNC: 8.9 MG/DL (ref 8.5–10.5)
CHLORIDE SERPL-SCNC: 97 MMOL/L (ref 96–112)
CO2 SERPL-SCNC: 15 MMOL/L (ref 20–33)
COLOR UR: YELLOW
CREAT SERPL-MCNC: 1.04 MG/DL (ref 0.5–1.4)
EKG IMPRESSION: NORMAL
EOSINOPHIL # BLD AUTO: 0 K/UL (ref 0–0.51)
EOSINOPHIL NFR BLD: 0 % (ref 0–6.9)
EPI CELLS #/AREA URNS HPF: NEGATIVE /HPF
ERYTHROCYTE [DISTWIDTH] IN BLOOD BY AUTOMATED COUNT: 39.8 FL (ref 35.9–50)
FLUAV RNA SPEC QL NAA+PROBE: NEGATIVE
FLUBV RNA SPEC QL NAA+PROBE: NEGATIVE
GLOBULIN SER CALC-MCNC: 3.1 G/DL (ref 1.9–3.5)
GLUCOSE SERPL-MCNC: 160 MG/DL (ref 65–99)
GLUCOSE UR STRIP.AUTO-MCNC: >=1000 MG/DL
HCT VFR BLD AUTO: 34.6 % (ref 42–52)
HGB BLD-MCNC: 12.8 G/DL (ref 14–18)
HYALINE CASTS #/AREA URNS LPF: ABNORMAL /LPF
IMM GRANULOCYTES # BLD AUTO: 0.02 K/UL (ref 0–0.11)
IMM GRANULOCYTES NFR BLD AUTO: 0.7 % (ref 0–0.9)
KETONES UR STRIP.AUTO-MCNC: 15 MG/DL
LEUKOCYTE ESTERASE UR QL STRIP.AUTO: NEGATIVE
LYMPHOCYTES # BLD AUTO: 0.58 K/UL (ref 1–4.8)
LYMPHOCYTES NFR BLD: 21.4 % (ref 22–41)
MCH RBC QN AUTO: 34 PG (ref 27–33)
MCHC RBC AUTO-ENTMCNC: 37 G/DL (ref 33.7–35.3)
MCV RBC AUTO: 92 FL (ref 81.4–97.8)
MICRO URNS: ABNORMAL
MONOCYTES # BLD AUTO: 0.3 K/UL (ref 0–0.85)
MONOCYTES NFR BLD AUTO: 11.1 % (ref 0–13.4)
NEUTROPHILS # BLD AUTO: 1.81 K/UL (ref 1.82–7.42)
NEUTROPHILS NFR BLD: 66.8 % (ref 44–72)
NITRITE UR QL STRIP.AUTO: NEGATIVE
NRBC # BLD AUTO: 0 K/UL
NRBC BLD-RTO: 0 /100 WBC
PH UR STRIP.AUTO: 5.5 [PH] (ref 5–8)
PLATELET # BLD AUTO: 154 K/UL (ref 164–446)
PMV BLD AUTO: 9.4 FL (ref 9–12.9)
POTASSIUM SERPL-SCNC: 4.5 MMOL/L (ref 3.6–5.5)
PROT SERPL-MCNC: 7 G/DL (ref 6–8.2)
PROT UR QL STRIP: 100 MG/DL
RBC # BLD AUTO: 3.76 M/UL (ref 4.7–6.1)
RBC # URNS HPF: ABNORMAL /HPF
RBC UR QL AUTO: NEGATIVE
RSV RNA SPEC QL NAA+PROBE: NEGATIVE
SARS-COV-2 RNA RESP QL NAA+PROBE: DETECTED
SODIUM SERPL-SCNC: 129 MMOL/L (ref 135–145)
SP GR UR STRIP.AUTO: 1.03
SPECIMEN SOURCE: ABNORMAL
UROBILINOGEN UR STRIP.AUTO-MCNC: 0.2 MG/DL
WBC # BLD AUTO: 2.7 K/UL (ref 4.8–10.8)
WBC #/AREA URNS HPF: ABNORMAL /HPF

## 2021-10-05 PROCEDURE — 93005 ELECTROCARDIOGRAM TRACING: CPT | Performed by: EMERGENCY MEDICINE

## 2021-10-05 PROCEDURE — M0243 CASIRIVI AND IMDEVI INFUSION: HCPCS

## 2021-10-05 PROCEDURE — 85025 COMPLETE CBC W/AUTO DIFF WBC: CPT

## 2021-10-05 PROCEDURE — 700105 HCHG RX REV CODE 258: Performed by: EMERGENCY MEDICINE

## 2021-10-05 PROCEDURE — C9803 HOPD COVID-19 SPEC COLLECT: HCPCS | Performed by: EMERGENCY MEDICINE

## 2021-10-05 PROCEDURE — 81001 URINALYSIS AUTO W/SCOPE: CPT

## 2021-10-05 PROCEDURE — 0241U HCHG SARS-COV-2 COVID-19 NFCT DS RESP RNA 4 TRGT MIC: CPT

## 2021-10-05 PROCEDURE — 700111 HCHG RX REV CODE 636 W/ 250 OVERRIDE (IP): Performed by: EMERGENCY MEDICINE

## 2021-10-05 PROCEDURE — 71045 X-RAY EXAM CHEST 1 VIEW: CPT

## 2021-10-05 PROCEDURE — 93005 ELECTROCARDIOGRAM TRACING: CPT

## 2021-10-05 PROCEDURE — 99284 EMERGENCY DEPT VISIT MOD MDM: CPT

## 2021-10-05 PROCEDURE — 80053 COMPREHEN METABOLIC PANEL: CPT

## 2021-10-05 RX ORDER — SODIUM CHLORIDE 9 MG/ML
1000 INJECTION, SOLUTION INTRAVENOUS ONCE
Status: COMPLETED | OUTPATIENT
Start: 2021-10-05 | End: 2021-10-05

## 2021-10-05 RX ORDER — DEXAMETHASONE 4 MG/1
6 TABLET ORAL DAILY
Qty: 7 TABLET | Refills: 0 | Status: SHIPPED | OUTPATIENT
Start: 2021-10-05 | End: 2021-10-11

## 2021-10-05 RX ADMIN — SODIUM CHLORIDE 1000 ML: 9 INJECTION, SOLUTION INTRAVENOUS at 17:04

## 2021-10-05 RX ADMIN — CASIRIVIMAB AND IMDEVIMAB 300 MG: 600; 600 INJECTION, SOLUTION, CONCENTRATE INTRAVENOUS at 18:11

## 2021-10-05 ASSESSMENT — LIFESTYLE VARIABLES
TOTAL SCORE: 0
TOTAL SCORE: 0
HAVE YOU EVER FELT YOU SHOULD CUT DOWN ON YOUR DRINKING: NO
TOTAL SCORE: 0
HAVE PEOPLE ANNOYED YOU BY CRITICIZING YOUR DRINKING: NO
EVER HAD A DRINK FIRST THING IN THE MORNING TO STEADY YOUR NERVES TO GET RID OF A HANGOVER: NO
EVER FELT BAD OR GUILTY ABOUT YOUR DRINKING: NO
CONSUMPTION TOTAL: INCOMPLETE
DO YOU DRINK ALCOHOL: NO

## 2021-10-05 NOTE — ED PROVIDER NOTES
"ED Provider  Scribed for Alexandro Hung D.O. by Karla Jules. 10/5/2021  3:13 PM    Means of arrival: walk-in  History obtained from: patient  History limited by: hard of hearing    CHIEF COMPLAINT  Chief Complaint   Patient presents with   • Weakness     Pt states \"I just feel sick, I just feel terrrible,\" for the last 4 days. Pt denies being tested or vaccinated for covid.   • Congestion     Pt reports taking mucinex at home with minimal relief.        HPI  Jerome Mendoza is a 65 y.o. male who presents for weakness and congestion onset 4 days ago. His symptoms are worse at night. He has been using mucinex at home with minimal relief. He reports associated body aches and cough. He denies any vomiting or diarrhea. He has not been tested for COVID nor is he vaccinated against COVID. He reports a history of type II diabetes but denies any history of heart or lung problems.     Further history of present illness cannot be obtained due to the patient being hard of hearing.      REVIEW OF SYSTEMS  See HPI for further details. Further ROS cannot be obtained due to the patient being hard of hearing.      PAST MEDICAL HISTORY   has a past medical history of Asthma (6/19/2014), Hearing loss of both ears (6/19/2014), Hyperlipidemia (6/19/2014), Lower urinary tract symptoms (LUTS) (11/17/2020), Type II or unspecified type diabetes mellitus without mention of complication, not stated as uncontrolled (6/19/2014), and Wears hearing aid (6/19/2014).    SOCIAL HISTORY  Social History     Tobacco Use   • Smoking status: Never Smoker   • Smokeless tobacco: Never Used   • Tobacco comment: avoid all tobacco products   Vaping Use   • Vaping Use: Never used   Substance and Sexual Activity   • Alcohol use: No     Alcohol/week: 0.0 oz   • Drug use: No   • Sexual activity: Yes     Partners: Female       SURGICAL HISTORY   has a past surgical history that includes tonsillectomy and adenoidectomy and vasectomy.    CURRENT " "MEDICATIONS  Home Medications     Reviewed by Katja Savage R.N. (Registered Nurse) on 10/05/21 at 1204  Med List Status: Not Addressed   Medication Last Dose Status   albuterol 108 (90 Base) MCG/ACT Aero Soln inhalation aerosol  Active   Cholecalciferol (VITAMIN D3) 2000 UNIT Cap  Active   dorzolamide-timolol (COSOPT) 22.3-6.8 MG/ML Solution  Active   Dulaglutide (TRULICITY) 1.5 MG/0.5ML Solution Pen-injector  Active   Empagliflozin (JARDIANCE) 25 MG Tab  Active   lisinopril (PRINIVIL) 10 MG Tab  Active   metFORMIN ER (GLUCOPHAGE XR) 750 MG TABLET SR 24 HR  Active   Mometasone Furoate (ASMANEX, 120 METERED DOSES, INH)  Active   prednisoLONE acetate (PRED FORTE) 1 % Suspension  Active   tamsulosin (FLOMAX) 0.4 MG capsule  Active   timolol (TIMOPTIC) 0.5 % Solution  Active   triamcinolone (TRIESENCE) 40 MG/ML Suspension  Active                ALLERGIES  Allergies   Allergen Reactions   • Penicillin G Swelling       PHYSICAL EXAM  VITAL SIGNS: /75   Pulse 81   Temp 36.3 °C (97.4 °F) (Temporal)   Resp 16   Ht 1.676 m (5' 6\")   Wt 63.8 kg (140 lb 10.5 oz)   SpO2 95%   BMI 22.70 kg/m²   Constitutional: Alert in no apparent distress. Hard of hearing.   HENT: No signs of trauma, mucous membranes are moist  Eyes: Conjunctiva normal, Non-icteric.   Neck: Normal range of motion, No tenderness, Supple.  Lymphatic: No lymphadenopathy noted.   Cardiovascular: Regular rate and rhythm, no murmurs.   Thorax & Lungs: Normal breath sounds, No respiratory distress, No wheezing, No chest tenderness.   Abdomen: Bowel sounds normal, Soft, No tenderness, No masses, No pulsatile masses. No peritoneal signs.  Skin: Warm, Dry, normal color.   Back: No bony tenderness, No CVA tenderness.   Extremities: No edema, No tenderness, No cyanosis  Musculoskeletal: Good range of motion in all major joints. No tenderness to palpation or major deformities noted.   Neurologic: Alert and oriented x4, Normal motor function, Normal sensory " function, No focal deficits noted.   Psychiatric: Affect normal, Judgment normal, Mood normal.     DIAGNOSTIC STUDIES / PROCEDURES    EKG  12 Lead EKG interpreted by me shown below.      LABS  Results for orders placed or performed during the hospital encounter of 10/05/21   CBC with Differential   Result Value Ref Range    WBC 2.7 (L) 4.8 - 10.8 K/uL    RBC 3.76 (L) 4.70 - 6.10 M/uL    Hemoglobin 12.8 (L) 14.0 - 18.0 g/dL    Hematocrit 34.6 (L) 42.0 - 52.0 %    MCV 92.0 81.4 - 97.8 fL    MCH 34.0 (H) 27.0 - 33.0 pg    MCHC 37.0 (H) 33.7 - 35.3 g/dL    RDW 39.8 35.9 - 50.0 fL    Platelet Count 154 (L) 164 - 446 K/uL    MPV 9.4 9.0 - 12.9 fL    Neutrophils-Polys 66.80 44.00 - 72.00 %    Lymphocytes 21.40 (L) 22.00 - 41.00 %    Monocytes 11.10 0.00 - 13.40 %    Eosinophils 0.00 0.00 - 6.90 %    Basophils 0.00 0.00 - 1.80 %    Immature Granulocytes 0.70 0.00 - 0.90 %    Nucleated RBC 0.00 /100 WBC    Neutrophils (Absolute) 1.81 (L) 1.82 - 7.42 K/uL    Lymphs (Absolute) 0.58 (L) 1.00 - 4.80 K/uL    Monos (Absolute) 0.30 0.00 - 0.85 K/uL    Eos (Absolute) 0.00 0.00 - 0.51 K/uL    Baso (Absolute) 0.00 0.00 - 0.12 K/uL    Immature Granulocytes (abs) 0.02 0.00 - 0.11 K/uL    NRBC (Absolute) 0.00 K/uL   Comp Metabolic Panel   Result Value Ref Range    Sodium 129 (L) 135 - 145 mmol/L    Potassium 4.5 3.6 - 5.5 mmol/L    Chloride 97 96 - 112 mmol/L    Co2 15 (L) 20 - 33 mmol/L    Anion Gap 17.0 (H) 7.0 - 16.0    Glucose 160 (H) 65 - 99 mg/dL    Bun 22 8 - 22 mg/dL    Creatinine 1.04 0.50 - 1.40 mg/dL    Calcium 8.9 8.5 - 10.5 mg/dL    AST(SGOT) 20 12 - 45 U/L    ALT(SGPT) 10 2 - 50 U/L    Alkaline Phosphatase 54 30 - 99 U/L    Total Bilirubin 0.4 0.1 - 1.5 mg/dL    Albumin 3.9 3.2 - 4.9 g/dL    Total Protein 7.0 6.0 - 8.2 g/dL    Globulin 3.1 1.9 - 3.5 g/dL    A-G Ratio 1.3 g/dL   COV-2, FLU A/B, AND RSV BY PCR (2-4 HOURS CEPHEID): Collect NP swab in VTM    Specimen: Respirate   Result Value Ref Range    Influenza virus A RNA  Negative Negative    Influenza virus B, PCR Negative Negative    RSV, PCR Negative Negative    SARS-CoV-2 by PCR DETECTED (AA)     SARS-CoV-2 Source NP Swab    ESTIMATED GFR   Result Value Ref Range    GFR If African American >60 >60 mL/min/1.73 m 2    GFR If Non African American >60 >60 mL/min/1.73 m 2   URINALYSIS CULTURE, IF INDICATED    Specimen: Urine   Result Value Ref Range    Color Yellow     Character Clear     Specific Gravity 1.026 <1.035    Ph 5.5 5.0 - 8.0    Glucose >=1000 (A) Negative mg/dL    Ketones 15 (A) Negative mg/dL    Protein 100 (A) Negative mg/dL    Bilirubin Negative Negative    Urobilinogen, Urine 0.2 Negative    Nitrite Negative Negative    Leukocyte Esterase Negative Negative    Occult Blood Negative Negative    Micro Urine Req Microscopic    URINE MICROSCOPIC (W/UA)   Result Value Ref Range    WBC 0-2 (A) /hpf    RBC 2-5 (A) /hpf    Bacteria Negative None /hpf    Epithelial Cells Negative /hpf    Hyaline Cast 0-2 /lpf   EKG   Result Value Ref Range    Report       [facility] Emergency Dept.    Test Date:  2021-10-05  Pt Name:    ASUNCION LAURA                 Department: ER  MRN:        [id number]                      Room:  Gender:     Male                         Technician: [id number]  :        1955                   Requested By:ER TRIAGE PROTOCOL  Order #:    [id number]                    Reading MD: REBECCA MCCLOUD D.O.    Measurements  Intervals                                Axis  Rate:       93                           P:          74  KS:         168                          QRS:        53  QRSD:       84                           T:          23  QT:         348  QTc:        433    Interpretive Statements  SINUS RHYTHM  No previous ECG available for comparison  Electronically Signed On 10-5-2021 17:00:08 PDT by REBECCA MCCLOUD D.O.        All labs reviewed by me.    RADIOLOGY  DX-CHEST-PORTABLE (1 VIEW)   Final Result      Patchy bibasilar infiltrate or  atelectasis.        The radiologist's interpretations of all radiological studies have been reviewed by me.    Films have been independently by me      COURSE  Pertinent Labs & Imaging studies reviewed. (See chart for details)    3:13 PM - Patient seen and examined at bedside. Discussed plan of care. Ordered for DX-chest, COV-2, Flu A/B, RSV, CBC w/ diff, CMP, and UA culture to evaluate his symptoms.     Monoclonal antibody infusion EUA progress note    This patient is considered a candidate for monoclonal antibody infusion to treat/prevent high risk SARS-CoV-2 virus infection.      Criteria for use (treatment):  -Mild to moderate illness for less than 10 days and is high risk for progressing to severe COVID-19 and/or hospitalization.    -Not hospitalized.   -Does not require oxygen therapy due to COVID-19.    ->40 kg and 12 years old or greater    Criteria for use (post-exposure prophylaxis):  -High risk for progressing to severe COVID-19 and/or hospitalization  -Exposed (close contact per CDC) to COVID-19 positive individual OR high risk of exposure to COVID-19 because of positive individual in institutional setting   -Not fully vaccinated or not expected to mount an adequate immune response due to immunocompromising condition or immunosuppressive medication  ->40 kg and 12 years old or greater    Date of positive COVID-19 test (treatment) or known exposure (post-exposure prophylaxis): Today    Vaccinated for COVID-19 (yes/no): No     Symptoms of COVID-19 (if being used for treatment): 4 days    High risk criteria:   -Age of 65 or greater  -Body mass index of 25 kg/m2or greater  -12-17 years of age and have  -BMI greater than or equal to 85% of their age and gender based CDC growth chart  -Chronic kidney disease  -Diabetes  -Pregnancy  -Immunosuppressive disease  -Receiving immunosuppressive treatment  -Chronic lung disease  -65 years of age or greater  -Cardiovascular disease  -Hypertension  -Medical related  technological dependence  -Sickle cell disease  -Congenital or acquired heart disease  -Neurodevelopmental disorder  -Other medical conditions or factors that place individual patients at high risk for progression to severe COVID-19 and authorization of REGEN-COV under the EUA is not limited to the medical conditions or factors listed above. Healthcare providers should consider the benefit-risk for an individual patient.    This patient has been given the EUA patient fact sheet and consents to receiving this medication.    Patient is a candidate due to her increased age and diabetes.    I did speak with the patient considered a positive Covid diagnosis, and the consideration for monoclonal antibodies.  It was explained to the patient this is an experimental medication that has been approved for emergent use of patients with Covid you have certain risk factors which she does have..  He was given the patient information sheet to read, and he is acceptable to except monoclonal antibodies.  He will be receiving medication and then he will be observed for 1 hour.  After 1 hour.  If there are no symptoms he will be discharged home.  He is not hypoxic has no respiratory distress, he is stable for this medication at discharge home    Critical care time 30 minutes          MEDICAL DECISION MAKING  This is a 65 y.o. male who presents with concerns of not feeling well and concerns of Covid.  His Covid test is positive.  He does have mild elevation of his white sugars, and is CO2 is mildly decreased.  This does not appear to be symptoms he is not tachycardic hypoxic, or febrile.  With a positive Covid test and his advanced age he has had higher risk and is eligible for monoclonal antibody treatment.  This was discussed with patient see above, he is observed in the emergency department for 1 hour and is discharged home in stable condition.      Discharged home in stable condition    FINAL IMPRESSION  1. COVID-19 virus infection          I, Karla Jules ([place]), am scribing for, and in the presence of, Alexandro Hung D.O..    Electronically signed by: Karla Jules ([place]), 10/5/2021    Alexandro WALLIS D.O. personally performed the services described in this documentation, as scribed by Karla Jules in my presence, and it is both accurate and complete.    The note accurately reflects work and decisions made by me.  Alexandro Hung D.O.  10/5/2021  5:05 PM

## 2021-10-05 NOTE — ED TRIAGE NOTES
"Chief Complaint   Patient presents with   • Weakness     Pt states \"I just feel sick, I just feel terrrible,\" for the last 4 days. Pt denies being tested or vaccinated for covid.   • Congestion     Pt reports taking mucinex at home with minimal relief.      /75   Pulse 91   Temp 36.1 °C (97 °F) (Temporal)   Resp 16   Ht 1.676 m (5' 6\")   Wt 63.8 kg (140 lb 10.5 oz)   SpO2 94%   BMI 22.70 kg/m²     Pt is ambulatory in and out of triage with a steady gait. Appropriate PPE worn throughout entire encounter. Pt placed back in the quarantine hallways and educated about triage process.    "

## 2021-10-05 NOTE — ED NOTES
Pt ambulated back to BL[facility] 14, pt resting on gurney with no signs of distress noted. VSS. Agree with triage assessment.    [facility]        NAME: Glen Altamirano is a 34 y o  female  : 1979    MRN: [id number]  DATE: 2018  TIME: 11:53 AM    Assessment and Plan   Acute frontal sinusitis, recurrence not specified [J01 10]  1  Acute frontal sinusitis, recurrence not specified  amoxicillin (AMOXIL) 875 mg tablet         Patient Instructions       Follow up with PCP in 3-5 days  Proceed to  ER if symptoms worsen  Chief Complaint     Chief Complaint   Patient presents with    Sinusitis     x 1 day         History of Present Illness       Sinusitis   This is a new problem  The current episode started in the past 7 days  The problem is unchanged  There has been no fever  Her pain is at a severity of 3/10  The pain is mild  Associated symptoms include chills, congestion, headaches, sinus pressure and sneezing  Pertinent negatives include no coughing, diaphoresis, ear pain, hoarse voice, neck pain, shortness of breath, sore throat or swollen glands  Past treatments include nothing  The treatment provided no relief  Review of Systems   Review of Systems   Constitutional: Positive for chills  Negative for diaphoresis  HENT: Positive for congestion, postnasal drip, rhinorrhea, sinus pain, sinus pressure and sneezing  Negative for dental problem, drooling, ear discharge, ear pain, facial swelling, hearing loss, hoarse voice, mouth sores, nosebleeds, sore throat, tinnitus, trouble swallowing and voice change  Eyes: Negative  Respiratory: Negative for apnea, cough, choking, chest tightness, shortness of breath, wheezing and stridor  Cardiovascular: Negative for chest pain, palpitations and leg swelling  Gastrointestinal: Negative  Negative for abdominal distention, abdominal pain, anal bleeding, blood in stool, constipation, diarrhea, nausea, rectal pain and vomiting  Endocrine: Negative  Genitourinary: Negative  Musculoskeletal: Negative  Negative for neck pain  Skin: Negative  Allergic/Immunologic: Negative  Neurological: Positive for headaches  Hematological: Negative  Psychiatric/Behavioral: Negative            Current Medications       Current Outpatient Prescriptions:     ALPRAZolam (XANAX) 1 mg tablet, Take 1 mg by mouth daily at bedtime as needed  , Disp: , Rfl:     ALPRAZolam (XANAX) 1 mg tablet, alprazolam 1 mg tablet, Disp: , Rfl:     desvenlafaxine (PRISTIQ) 100 mg 24 hr tablet, Take 100 mg by mouth daily  , Disp: , Rfl:     gabapentin (NEURONTIN) 400 mg capsule, Take 500 mg by mouth  , Disp: , Rfl:     medroxyPROGESTERone acetate (DEPO-PROVERA SYRINGE) 150 mg/mL injection, , Disp: , Rfl: 0    OXcarbazepine (TRILEPTAL) 300 mg tablet, oxcarbazepine 300 mg tablet, Disp: , Rfl:     pantoprazole (PROTONIX) 40 mg tablet, pantoprazole 40 mg tablet,delayed release, Disp: , Rfl:     PROAIR  (90 Base) MCG/ACT inhaler, , Disp: , Rfl:     topiramate (TOPAMAX) 100 mg tablet, Take 100 mg by mouth 2 (two) times a day  , Disp: , Rfl:     traMADol (ULTRAM) 50 mg tablet, 50 mg as needed  , Disp: , Rfl: 0    valACYclovir (VALTREX) 500 mg tablet, Take 500 mg by mouth, Disp: , Rfl:     amoxicillin (AMOXIL) 875 mg tablet, Take 1 tablet (875 mg total) by mouth 2 (two) times a day for 10 days, Disp: 20 tablet, Rfl: 0    carBAMazepine (TEGretol) 200 mg tablet, carbamazepine 200 mg tablet, Disp: , Rfl:     ferrous sulfate (FEOSOL) 325 (65 Fe) mg tablet, Take 325 mg by mouth daily with breakfast  , Disp: , Rfl:     Linaclotide (LINZESS) 145 MCG CAPS, Linzess 145 mcg capsule, Disp: , Rfl:     medroxyPROGESTERone (DEPO-PROVERA) 150 mg/mL injection, medroxyprogesterone 150 mg/mL intramuscular suspension, Disp: , Rfl:     meloxicam (MOBIC) 15 mg tablet, meloxicam 15 mg tablet, Disp: , Rfl:     OXcarbazepine (TRILEPTAL) 300 mg tablet, Take 300 mg by mouth 2 (two) times a day, Disp: , Rfl: 2    oxyCODONE-acetaminophen (PERCOCET) 5-325 mg per tablet, oxycodone-acetaminophen 5 mg-325 mg tablet, Disp: , Rfl:     pantoprazole (PROTONIX) 40 mg tablet, Take 40 mg by mouth 2 (two) times a day  , Disp: , Rfl:     Current Allergies     Allergies as of 09/01/2018 - Reviewed 09/01/2018   Allergen Reaction Noted    Lamotrigine Rash and Hives 02/24/2010            The following portions of the patient's history were reviewed and updated as appropriate: allergies, current medications, past family history, past medical history, past social history, past surgical history and problem list      Past Medical History:   Diagnosis Date    Bipolar 1 disorder ([address] )     with bordeline personality    Depression     Depression     GERD (gastroesophageal reflux disease)     Incontinence     Migraine     Urinary tract infection        Past Surgical History:   Procedure Laterality Date    CHOLECYSTECTOMY  05/24/2018    FOOT SURGERY      KNEE ARTHROSCOPY      OVARIAN CYST REMOVAL      STOMACH SURGERY         Family History   Problem Relation Age of Onset    Hyperlipidemia Father     Heart disease Mother     Depression Family          Medications have been verified  Objective   /86   Pulse 60   Temp 98 2 °F (36 8 °C)   Resp 16   Ht 5' 5" (1 651 m)   Wt 74 8 kg (165 lb)   SpO2 99%   BMI 27 46 kg/m²        Physical Exam     Physical Exam   Constitutional: She is oriented to person, place, and time  Vital signs are normal  She appears well-developed and well-nourished  HENT:   Head: Normocephalic and atraumatic  Right Ear: Hearing, tympanic membrane, external ear and ear canal normal    Left Ear: Hearing, tympanic membrane, external ear and ear canal normal    Nose: Rhinorrhea and sinus tenderness present  Right sinus exhibits frontal sinus tenderness  Left sinus exhibits frontal sinus tenderness  Mouth/Throat: Uvula is midline and mucous membranes are normal  Posterior oropharyngeal erythema present     Eyes: Conjunctivae and EOM are normal  Pupils are equal, round, and reactive to light  Neck: Trachea normal, normal range of motion and full passive range of motion without pain  Cardiovascular: Normal rate and regular rhythm  Pulmonary/Chest: Effort normal and breath sounds normal    Abdominal: Soft  Normal appearance  Musculoskeletal: Normal range of motion  Lymphadenopathy:     She has cervical adenopathy  Right cervical: Superficial cervical adenopathy present  Left cervical: Superficial cervical adenopathy present  Neurological: She is alert and oriented to person, place, and time  Nursing note and vitals reviewed

## 2021-10-06 NOTE — DISCHARGE INSTRUCTIONS
You receive Regeneron, this is monoclonal antibodies, this is an experimental medication that can help reduce the symptoms and duration of Covid symptoms.  Also being placed on a steroid medicine.  This can cause your blood sugar to increase.  Please monitor this closely.  Please see your primary care doctor for follow-up and return if your symptoms change or worsen.

## 2021-10-06 NOTE — ED NOTES
PIV removed, catheter intact. Discharge education provided. Discharge paperwork signed by pt. Prescription to be picked up by pt. All questions answered. All belongings with pt. Pt to lobby in a whelchair

## 2021-10-12 ENCOUNTER — APPOINTMENT (OUTPATIENT)
Dept: RADIOLOGY | Facility: MEDICAL CENTER | Age: 66
DRG: 177 | End: 2021-10-12
Attending: INTERNAL MEDICINE
Payer: MEDICARE

## 2021-10-12 ENCOUNTER — APPOINTMENT (OUTPATIENT)
Dept: RADIOLOGY | Facility: MEDICAL CENTER | Age: 66
DRG: 177 | End: 2021-10-12
Attending: EMERGENCY MEDICINE
Payer: MEDICARE

## 2021-10-12 ENCOUNTER — HOSPITAL ENCOUNTER (INPATIENT)
Facility: MEDICAL CENTER | Age: 66
LOS: 1 days | DRG: 177 | End: 2021-10-13
Attending: EMERGENCY MEDICINE | Admitting: INTERNAL MEDICINE
Payer: MEDICARE

## 2021-10-12 DIAGNOSIS — I26.99 ACUTE PULMONARY EMBOLISM, UNSPECIFIED PULMONARY EMBOLISM TYPE, UNSPECIFIED WHETHER ACUTE COR PULMONALE PRESENT (HCC): ICD-10-CM

## 2021-10-12 DIAGNOSIS — J96.01 ACUTE HYPOXEMIC RESPIRATORY FAILURE DUE TO COVID-19 (HCC): ICD-10-CM

## 2021-10-12 DIAGNOSIS — U07.1 COVID-19: ICD-10-CM

## 2021-10-12 DIAGNOSIS — H35.00 RETINOPATHY OF RIGHT EYE: ICD-10-CM

## 2021-10-12 DIAGNOSIS — U07.1 ACUTE HYPOXEMIC RESPIRATORY FAILURE DUE TO COVID-19 (HCC): ICD-10-CM

## 2021-10-12 PROBLEM — J44.9 COPD (CHRONIC OBSTRUCTIVE PULMONARY DISEASE) (HCC): Chronic | Status: RESOLVED | Noted: 2021-10-12 | Resolved: 2021-10-12

## 2021-10-12 PROBLEM — R09.02 HYPOXIA: Status: ACTIVE | Noted: 2021-10-12

## 2021-10-12 PROBLEM — J44.9 COPD (CHRONIC OBSTRUCTIVE PULMONARY DISEASE) (HCC): Chronic | Status: ACTIVE | Noted: 2021-10-12

## 2021-10-12 LAB
ALBUMIN SERPL BCP-MCNC: 3.5 G/DL (ref 3.2–4.9)
ALBUMIN/GLOB SERPL: 0.9 G/DL
ALP SERPL-CCNC: 51 U/L (ref 30–99)
ALT SERPL-CCNC: 7 U/L (ref 2–50)
ANION GAP SERPL CALC-SCNC: 22 MMOL/L (ref 7–16)
AST SERPL-CCNC: 9 U/L (ref 12–45)
BASOPHILS # BLD AUTO: 0.3 % (ref 0–1.8)
BASOPHILS # BLD: 0.02 K/UL (ref 0–0.12)
BILIRUB SERPL-MCNC: 0.4 MG/DL (ref 0.1–1.5)
BUN SERPL-MCNC: 25 MG/DL (ref 8–22)
CALCIUM SERPL-MCNC: 9.5 MG/DL (ref 8.5–10.5)
CHLORIDE SERPL-SCNC: 97 MMOL/L (ref 96–112)
CO2 SERPL-SCNC: 14 MMOL/L (ref 20–33)
CREAT SERPL-MCNC: 1.02 MG/DL (ref 0.5–1.4)
D DIMER PPP IA.FEU-MCNC: 4.48 UG/ML (FEU) (ref 0–0.5)
EOSINOPHIL # BLD AUTO: 0.15 K/UL (ref 0–0.51)
EOSINOPHIL NFR BLD: 2.1 % (ref 0–6.9)
ERYTHROCYTE [DISTWIDTH] IN BLOOD BY AUTOMATED COUNT: 42.5 FL (ref 35.9–50)
GLOBULIN SER CALC-MCNC: 3.8 G/DL (ref 1.9–3.5)
GLUCOSE BLD-MCNC: 184 MG/DL (ref 65–99)
GLUCOSE SERPL-MCNC: 200 MG/DL (ref 65–99)
HCT VFR BLD AUTO: 40 % (ref 42–52)
HGB BLD-MCNC: 13.4 G/DL (ref 14–18)
IMM GRANULOCYTES # BLD AUTO: 0.04 K/UL (ref 0–0.11)
IMM GRANULOCYTES NFR BLD AUTO: 0.5 % (ref 0–0.9)
LYMPHOCYTES # BLD AUTO: 0.59 K/UL (ref 1–4.8)
LYMPHOCYTES NFR BLD: 8.1 % (ref 22–41)
MCH RBC QN AUTO: 32.4 PG (ref 27–33)
MCHC RBC AUTO-ENTMCNC: 33.5 G/DL (ref 33.7–35.3)
MCV RBC AUTO: 96.9 FL (ref 81.4–97.8)
MONOCYTES # BLD AUTO: 0.43 K/UL (ref 0–0.85)
MONOCYTES NFR BLD AUTO: 5.9 % (ref 0–13.4)
NEUTROPHILS # BLD AUTO: 6.06 K/UL (ref 1.82–7.42)
NEUTROPHILS NFR BLD: 83.1 % (ref 44–72)
NRBC # BLD AUTO: 0 K/UL
NRBC BLD-RTO: 0 /100 WBC
PLATELET # BLD AUTO: 395 K/UL (ref 164–446)
PMV BLD AUTO: 8.9 FL (ref 9–12.9)
POTASSIUM SERPL-SCNC: 4.5 MMOL/L (ref 3.6–5.5)
PROCALCITONIN SERPL-MCNC: <0.05 NG/ML
PROT SERPL-MCNC: 7.3 G/DL (ref 6–8.2)
RBC # BLD AUTO: 4.13 M/UL (ref 4.7–6.1)
SODIUM SERPL-SCNC: 133 MMOL/L (ref 135–145)
WBC # BLD AUTO: 7.3 K/UL (ref 4.8–10.8)

## 2021-10-12 PROCEDURE — 84145 PROCALCITONIN (PCT): CPT

## 2021-10-12 PROCEDURE — A9270 NON-COVERED ITEM OR SERVICE: HCPCS

## 2021-10-12 PROCEDURE — 85025 COMPLETE CBC W/AUTO DIFF WBC: CPT

## 2021-10-12 PROCEDURE — 71045 X-RAY EXAM CHEST 1 VIEW: CPT

## 2021-10-12 PROCEDURE — 700111 HCHG RX REV CODE 636 W/ 250 OVERRIDE (IP): Performed by: INTERNAL MEDICINE

## 2021-10-12 PROCEDURE — 82962 GLUCOSE BLOOD TEST: CPT

## 2021-10-12 PROCEDURE — 700102 HCHG RX REV CODE 250 W/ 637 OVERRIDE(OP)

## 2021-10-12 PROCEDURE — 99223 1ST HOSP IP/OBS HIGH 75: CPT | Mod: AI | Performed by: INTERNAL MEDICINE

## 2021-10-12 PROCEDURE — 99285 EMERGENCY DEPT VISIT HI MDM: CPT

## 2021-10-12 PROCEDURE — 80053 COMPREHEN METABOLIC PANEL: CPT

## 2021-10-12 PROCEDURE — 700102 HCHG RX REV CODE 250 W/ 637 OVERRIDE(OP): Performed by: INTERNAL MEDICINE

## 2021-10-12 PROCEDURE — 85379 FIBRIN DEGRADATION QUANT: CPT

## 2021-10-12 PROCEDURE — 770001 HCHG ROOM/CARE - MED/SURG/GYN PRIV*

## 2021-10-12 PROCEDURE — A9270 NON-COVERED ITEM OR SERVICE: HCPCS | Performed by: INTERNAL MEDICINE

## 2021-10-12 RX ORDER — BISACODYL 10 MG
10 SUPPOSITORY, RECTAL RECTAL
Status: DISCONTINUED | OUTPATIENT
Start: 2021-10-12 | End: 2021-10-13 | Stop reason: HOSPADM

## 2021-10-12 RX ORDER — DEXTROSE MONOHYDRATE 25 G/50ML
50 INJECTION, SOLUTION INTRAVENOUS
Status: DISCONTINUED | OUTPATIENT
Start: 2021-10-12 | End: 2021-10-13 | Stop reason: HOSPADM

## 2021-10-12 RX ORDER — POLYETHYLENE GLYCOL 3350 17 G/17G
1 POWDER, FOR SOLUTION ORAL
Status: DISCONTINUED | OUTPATIENT
Start: 2021-10-12 | End: 2021-10-13 | Stop reason: HOSPADM

## 2021-10-12 RX ORDER — LABETALOL HYDROCHLORIDE 5 MG/ML
10 INJECTION, SOLUTION INTRAVENOUS EVERY 4 HOURS PRN
Status: DISCONTINUED | OUTPATIENT
Start: 2021-10-12 | End: 2021-10-13 | Stop reason: HOSPADM

## 2021-10-12 RX ORDER — ONDANSETRON 2 MG/ML
4 INJECTION INTRAMUSCULAR; INTRAVENOUS EVERY 6 HOURS PRN
Status: DISCONTINUED | OUTPATIENT
Start: 2021-10-12 | End: 2021-10-13 | Stop reason: HOSPADM

## 2021-10-12 RX ORDER — ACETAMINOPHEN 325 MG/1
650 TABLET ORAL ONCE
Status: COMPLETED | OUTPATIENT
Start: 2021-10-12 | End: 2021-10-12

## 2021-10-12 RX ORDER — ENALAPRILAT 1.25 MG/ML
1.25 INJECTION INTRAVENOUS EVERY 6 HOURS PRN
Status: DISCONTINUED | OUTPATIENT
Start: 2021-10-12 | End: 2021-10-13 | Stop reason: HOSPADM

## 2021-10-12 RX ORDER — ONDANSETRON 4 MG/1
4 TABLET, ORALLY DISINTEGRATING ORAL EVERY 6 HOURS PRN
Status: DISCONTINUED | OUTPATIENT
Start: 2021-10-12 | End: 2021-10-13 | Stop reason: HOSPADM

## 2021-10-12 RX ORDER — AMOXICILLIN 250 MG
2 CAPSULE ORAL 2 TIMES DAILY
Status: DISCONTINUED | OUTPATIENT
Start: 2021-10-13 | End: 2021-10-13 | Stop reason: HOSPADM

## 2021-10-12 RX ORDER — GUAIFENESIN/DEXTROMETHORPHAN 100-10MG/5
10 SYRUP ORAL EVERY 6 HOURS PRN
Status: DISCONTINUED | OUTPATIENT
Start: 2021-10-12 | End: 2021-10-13 | Stop reason: HOSPADM

## 2021-10-12 RX ORDER — ACETAMINOPHEN 325 MG/1
650 TABLET ORAL EVERY 6 HOURS PRN
Status: DISCONTINUED | OUTPATIENT
Start: 2021-10-12 | End: 2021-10-13 | Stop reason: HOSPADM

## 2021-10-12 RX ORDER — DEXAMETHASONE 6 MG/1
6 TABLET ORAL DAILY
Status: DISCONTINUED | OUTPATIENT
Start: 2021-10-12 | End: 2021-10-13 | Stop reason: HOSPADM

## 2021-10-12 RX ORDER — LISINOPRIL 10 MG/1
10 TABLET ORAL DAILY
Status: DISCONTINUED | OUTPATIENT
Start: 2021-10-13 | End: 2021-10-13 | Stop reason: HOSPADM

## 2021-10-12 RX ORDER — TAMSULOSIN HYDROCHLORIDE 0.4 MG/1
0.4 CAPSULE ORAL
Status: DISCONTINUED | OUTPATIENT
Start: 2021-10-12 | End: 2021-10-13 | Stop reason: HOSPADM

## 2021-10-12 RX ORDER — TIMOLOL MALEATE 5 MG/ML
1 SOLUTION/ DROPS OPHTHALMIC DAILY
Status: DISCONTINUED | OUTPATIENT
Start: 2021-10-13 | End: 2021-10-13 | Stop reason: HOSPADM

## 2021-10-12 RX ADMIN — INSULIN HUMAN 2 UNITS: 100 INJECTION, SOLUTION PARENTERAL at 21:39

## 2021-10-12 RX ADMIN — ACETAMINOPHEN 650 MG: 325 TABLET, FILM COATED ORAL at 14:40

## 2021-10-12 RX ADMIN — DEXAMETHASONE 6 MG: 4 TABLET ORAL at 18:00

## 2021-10-12 RX ADMIN — ENOXAPARIN SODIUM 40 MG: 40 INJECTION SUBCUTANEOUS at 21:38

## 2021-10-12 ASSESSMENT — FIBROSIS 4 INDEX: FIB4 SCORE: 2.75

## 2021-10-12 ASSESSMENT — ENCOUNTER SYMPTOMS
MYALGIAS: 1
EYES NEGATIVE: 1
WEAKNESS: 1
GASTROINTESTINAL NEGATIVE: 1
COUGH: 1
CONSTITUTIONAL NEGATIVE: 1
PSYCHIATRIC NEGATIVE: 1
SHORTNESS OF BREATH: 1
CARDIOVASCULAR NEGATIVE: 1

## 2021-10-12 ASSESSMENT — PAIN DESCRIPTION - PAIN TYPE: TYPE: ACUTE PAIN

## 2021-10-12 NOTE — ED TRIAGE NOTES
Patient arrived to ED triage with complaints of weakness, shortness of breath, general malaise and sore throat. COVID + 10/5/21. Onset of symptoms 10/1/21. He received monocolonal antibodies, but continues to have symptoms. He reports he has SOB and fatigue even when getting dressing or preforming other ADLs.  92% on RA on arrival to ED. Denies fever in last 24hrs. Reports non productive cough.     Mask in place. [facility]     Pt educated on ED process and asked to wait in lobby. Patient educated on importance of alerting staff to new or worsening symptoms or concerns.

## 2021-10-12 NOTE — ED PROVIDER NOTES
ED Provider Note      CHIEF COMPLAINT  Chief Complaint   Patient presents with   • Coronavirus Screening   • Weakness   • Shortness of Breath   • Sore Throat       HPI  Jerome Mendoza is a 65 y.o. male who returns with Covid symptoms.  Unvaccinated.  He has had symptoms for about a week and a half, he was diagnosed positive a week ago at which time he was treated with Regeneron.  His symptoms include severe body aches, generalized weakness, dry throat and shortness of breath.  He was also treated with steroids.  He said he is actually better for a day or 2 after the Regeneron but has since worsened.  His past medical history significant for diabetes, asthma, hyperlipidemia.  He offers no other acute complaints at this time.  No fever in the last 4 days.    REVIEW OF SYSTEMS  Negative for fever, rash, chest pain, abdominal pain, back pain. All other systems are negative.     PAST MEDICAL HISTORY   has a past medical history of Asthma (6/19/2014), Hearing loss of both ears (6/19/2014), Hyperlipidemia (6/19/2014), Lower urinary tract symptoms (LUTS) (11/17/2020), Type II or unspecified type diabetes mellitus without mention of complication, not stated as uncontrolled (6/19/2014), and Wears hearing aid (6/19/2014).    SOCIAL HISTORY  Social History     Tobacco Use   • Smoking status: Never Smoker   • Smokeless tobacco: Never Used   • Tobacco comment: avoid all tobacco products   Vaping Use   • Vaping Use: Never used   Substance and Sexual Activity   • Alcohol use: No     Alcohol/week: 0.0 oz   • Drug use: No   • Sexual activity: Yes     Partners: Female       SURGICAL HISTORY   has a past surgical history that includes tonsillectomy and adenoidectomy and vasectomy.    CURRENT MEDICATIONS  I personally reviewed the medication list in the charting documentation.     ALLERGIES  Allergies   Allergen Reactions   • Penicillin G Swelling       PHYSICAL EXAM  VITAL SIGNS: /62   Pulse 89   Temp 37.4 °C (99.3 °F)  "(Oral)   Resp 16   Ht 1.676 m (5' 6\")   Wt 61.8 kg (136 lb 3.9 oz)   SpO2 89%   BMI 21.99 kg/m²   Constitutional: Elderly, chronically ill-appearing and mildly acutely ill-appearing with labored breathing  HENT: Normocephalic, no obvious evidence of acute trauma.   Neck: Comfortable movement without any obvious restriction in the range of motion.  Eyes: Conjunctiva normal, Non-icteric.   Chest: Increased respiratory effort appreciated  Skin: The exposed portions of skin reveal no obvious rash or other abnormalities.  Musculoskeletal: No obvious restriction in the range of motion in all major joints.   Neurologic: Alert, No obvious focal deficits noted.   Psychiatric: Affect normal for clinical presentation    DIAGNOSTIC STUDIES / PROCEDURES    LABS/EKGs  Results for orders placed or performed during the hospital encounter of 10/12/21   CBC WITH DIFFERENTIAL   Result Value Ref Range    WBC 7.3 4.8 - 10.8 K/uL    RBC 4.13 (L) 4.70 - 6.10 M/uL    Hemoglobin 13.4 (L) 14.0 - 18.0 g/dL    Hematocrit 40.0 (L) 42.0 - 52.0 %    MCV 96.9 81.4 - 97.8 fL    MCH 32.4 27.0 - 33.0 pg    MCHC 33.5 (L) 33.7 - 35.3 g/dL    RDW 42.5 35.9 - 50.0 fL    Platelet Count 395 164 - 446 K/uL    MPV 8.9 (L) 9.0 - 12.9 fL    Neutrophils-Polys 83.10 (H) 44.00 - 72.00 %    Lymphocytes 8.10 (L) 22.00 - 41.00 %    Monocytes 5.90 0.00 - 13.40 %    Eosinophils 2.10 0.00 - 6.90 %    Basophils 0.30 0.00 - 1.80 %    Immature Granulocytes 0.50 0.00 - 0.90 %    Nucleated RBC 0.00 /100 WBC    Neutrophils (Absolute) 6.06 1.82 - 7.42 K/uL    Lymphs (Absolute) 0.59 (L) 1.00 - 4.80 K/uL    Monos (Absolute) 0.43 0.00 - 0.85 K/uL    Eos (Absolute) 0.15 0.00 - 0.51 K/uL    Baso (Absolute) 0.02 0.00 - 0.12 K/uL    Immature Granulocytes (abs) 0.04 0.00 - 0.11 K/uL    NRBC (Absolute) 0.00 K/uL   COMP METABOLIC PANEL   Result Value Ref Range    Sodium 133 (L) 135 - 145 mmol/L    Potassium 4.5 3.6 - 5.5 mmol/L    Chloride 97 96 - 112 mmol/L    Co2 14 (L) 20 - 33 " mmol/L    Anion Gap 22.0 (H) 7.0 - 16.0    Glucose 200 (H) 65 - 99 mg/dL    Bun 25 (H) 8 - 22 mg/dL    Creatinine 1.02 0.50 - 1.40 mg/dL    Calcium 9.5 8.5 - 10.5 mg/dL    AST(SGOT) 9 (L) 12 - 45 U/L    ALT(SGPT) 7 2 - 50 U/L    Alkaline Phosphatase 51 30 - 99 U/L    Total Bilirubin 0.4 0.1 - 1.5 mg/dL    Albumin 3.5 3.2 - 4.9 g/dL    Total Protein 7.3 6.0 - 8.2 g/dL    Globulin 3.8 (H) 1.9 - 3.5 g/dL    A-G Ratio 0.9 g/dL   ESTIMATED GFR   Result Value Ref Range    GFR If African American >60 >60 mL/min/1.73 m 2    GFR If Non African American >60 >60 mL/min/1.73 m 2        RADIOLOGY  DX-CHEST-PORTABLE (1 VIEW)   Final Result      Airspace opacity throughout the left lung and at that the right lung base compatible with multifocal pneumonia.            COURSE & MEDICAL DECISION MAKING  Pertinent Labs & Imaging studies reviewed. (See chart for details)    Encounter Summary: This is a very pleasant 65 y.o. male who unfortunately required evaluation in the emergency department today with progressive shortness of breath in the setting of Covid, also has other symptoms including weakness and fatigue and body aches.  Here in the emergency department he will desaturate to 84% with exertion, 88 to 90% with rest.  X-rays consistent with multifocal pneumonia, this is Covid pneumonia.  Requires supplemental oxygen.  The patient is going to have to be admitted to the hospital unfortunately as we cannot set up home oxygen or the home monitoring program at this hour.  He will be admitted to the hospital in guarded condition    CRITICAL CARE  The very real possibilty of a deterioration of this patient's condition required the highest level of my preparedness for sudden, emergent intervention.  I provided critical care services, which included medication orders, frequent reevaluations of the patient's condition and response to treatment, ordering and reviewing test results, and discussing the case with various consultants.  The  critical care time associated with the care of the patient was 39 minutes. Review chart for interventions. This time is exclusive of any other billable procedures.       DISPOSITION: Admit in guarded condition      FINAL IMPRESSION  1. Acute hypoxemic respiratory failure due to COVID-19 (HCC)        This dictation was created using voice recognition software. The accuracy of the dictation is limited to the abilities of the software. I expect there may be some errors of grammar and possibly content. The nursing notes were reviewed and certain aspects of this information were incorporated into this note.    Electronically signed by: Ted Fagan M.D., 10/12/2021 4:12 PM

## 2021-10-13 ENCOUNTER — PATIENT OUTREACH (OUTPATIENT)
Dept: HEALTH INFORMATION MANAGEMENT | Facility: OTHER | Age: 66
End: 2021-10-13

## 2021-10-13 ENCOUNTER — APPOINTMENT (OUTPATIENT)
Dept: CARDIOLOGY | Facility: MEDICAL CENTER | Age: 66
DRG: 177 | End: 2021-10-13
Attending: NURSE PRACTITIONER
Payer: MEDICARE

## 2021-10-13 ENCOUNTER — TELEPHONE (OUTPATIENT)
Dept: OTHER | Facility: MEDICAL CENTER | Age: 66
End: 2021-10-13

## 2021-10-13 VITALS
OXYGEN SATURATION: 90 % | HEIGHT: 66 IN | RESPIRATION RATE: 20 BRPM | SYSTOLIC BLOOD PRESSURE: 111 MMHG | TEMPERATURE: 96.9 F | WEIGHT: 136.24 LBS | BODY MASS INDEX: 21.9 KG/M2 | DIASTOLIC BLOOD PRESSURE: 60 MMHG | HEART RATE: 75 BPM

## 2021-10-13 VITALS — HEART RATE: 72 BPM | RESPIRATION RATE: 23 BRPM | OXYGEN SATURATION: 95 %

## 2021-10-13 VITALS — HEART RATE: 85 BPM | RESPIRATION RATE: 14 BRPM | OXYGEN SATURATION: 84 %

## 2021-10-13 PROBLEM — I26.99 ACUTE PULMONARY EMBOLISM (HCC): Status: ACTIVE | Noted: 2021-10-13

## 2021-10-13 LAB
ALBUMIN SERPL BCP-MCNC: 3.1 G/DL (ref 3.2–4.9)
ALBUMIN/GLOB SERPL: 0.8 G/DL
ALP SERPL-CCNC: 50 U/L (ref 30–99)
ALT SERPL-CCNC: 9 U/L (ref 2–50)
ANION GAP SERPL CALC-SCNC: 15 MMOL/L (ref 7–16)
ANION GAP SERPL CALC-SCNC: 17 MMOL/L (ref 7–16)
AST SERPL-CCNC: 10 U/L (ref 12–45)
BILIRUB SERPL-MCNC: 0.3 MG/DL (ref 0.1–1.5)
BUN SERPL-MCNC: 29 MG/DL (ref 8–22)
BUN SERPL-MCNC: 29 MG/DL (ref 8–22)
CALCIUM SERPL-MCNC: 9.1 MG/DL (ref 8.5–10.5)
CALCIUM SERPL-MCNC: 9.3 MG/DL (ref 8.5–10.5)
CHLORIDE SERPL-SCNC: 101 MMOL/L (ref 96–112)
CHLORIDE SERPL-SCNC: 97 MMOL/L (ref 96–112)
CO2 SERPL-SCNC: 16 MMOL/L (ref 20–33)
CO2 SERPL-SCNC: 18 MMOL/L (ref 20–33)
CREAT SERPL-MCNC: 0.92 MG/DL (ref 0.5–1.4)
CREAT SERPL-MCNC: 0.98 MG/DL (ref 0.5–1.4)
ERYTHROCYTE [DISTWIDTH] IN BLOOD BY AUTOMATED COUNT: 38.5 FL (ref 35.9–50)
GLOBULIN SER CALC-MCNC: 3.7 G/DL (ref 1.9–3.5)
GLUCOSE BLD-MCNC: 279 MG/DL (ref 65–99)
GLUCOSE BLD-MCNC: 284 MG/DL (ref 65–99)
GLUCOSE SERPL-MCNC: 294 MG/DL (ref 65–99)
GLUCOSE SERPL-MCNC: 327 MG/DL (ref 65–99)
HCT VFR BLD AUTO: 33.3 % (ref 42–52)
HGB BLD-MCNC: 12.2 G/DL (ref 14–18)
LV EJECT FRACT  99904: 65
LV EJECT FRACT MOD 4C 99902: 59.34
MCH RBC QN AUTO: 33.3 PG (ref 27–33)
MCHC RBC AUTO-ENTMCNC: 36.6 G/DL (ref 33.7–35.3)
MCV RBC AUTO: 91 FL (ref 81.4–97.8)
PLATELET # BLD AUTO: 351 K/UL (ref 164–446)
PMV BLD AUTO: 9.1 FL (ref 9–12.9)
POTASSIUM SERPL-SCNC: 4.6 MMOL/L (ref 3.6–5.5)
POTASSIUM SERPL-SCNC: 4.9 MMOL/L (ref 3.6–5.5)
PROT SERPL-MCNC: 6.8 G/DL (ref 6–8.2)
RBC # BLD AUTO: 3.66 M/UL (ref 4.7–6.1)
SODIUM SERPL-SCNC: 130 MMOL/L (ref 135–145)
SODIUM SERPL-SCNC: 134 MMOL/L (ref 135–145)
WBC # BLD AUTO: 5.3 K/UL (ref 4.8–10.8)

## 2021-10-13 PROCEDURE — 80053 COMPREHEN METABOLIC PANEL: CPT

## 2021-10-13 PROCEDURE — 700105 HCHG RX REV CODE 258: Performed by: FAMILY MEDICINE

## 2021-10-13 PROCEDURE — 93308 TTE F-UP OR LMTD: CPT | Mod: 26 | Performed by: INTERNAL MEDICINE

## 2021-10-13 PROCEDURE — A9270 NON-COVERED ITEM OR SERVICE: HCPCS | Performed by: NURSE PRACTITIONER

## 2021-10-13 PROCEDURE — 80048 BASIC METABOLIC PNL TOTAL CA: CPT

## 2021-10-13 PROCEDURE — 99454 REM MNTR PHYSIOL PARAM 16-30: CPT

## 2021-10-13 PROCEDURE — 99453 REM MNTR PHYSIOL PARAM SETUP: CPT | Mod: 25

## 2021-10-13 PROCEDURE — 700102 HCHG RX REV CODE 250 W/ 637 OVERRIDE(OP): Performed by: INTERNAL MEDICINE

## 2021-10-13 PROCEDURE — 71275 CT ANGIOGRAPHY CHEST: CPT | Mod: ME

## 2021-10-13 PROCEDURE — 36415 COLL VENOUS BLD VENIPUNCTURE: CPT

## 2021-10-13 PROCEDURE — 700102 HCHG RX REV CODE 250 W/ 637 OVERRIDE(OP): Performed by: NURSE PRACTITIONER

## 2021-10-13 PROCEDURE — 99239 HOSP IP/OBS DSCHRG MGMT >30: CPT | Performed by: FAMILY MEDICINE

## 2021-10-13 PROCEDURE — 93321 DOPPLER ECHO F-UP/LMTD STD: CPT | Mod: 26 | Performed by: INTERNAL MEDICINE

## 2021-10-13 PROCEDURE — 82962 GLUCOSE BLOOD TEST: CPT | Mod: 91

## 2021-10-13 PROCEDURE — 700101 HCHG RX REV CODE 250: Performed by: INTERNAL MEDICINE

## 2021-10-13 PROCEDURE — 93325 DOPPLER ECHO COLOR FLOW MAPG: CPT

## 2021-10-13 PROCEDURE — 700117 HCHG RX CONTRAST REV CODE 255: Performed by: INTERNAL MEDICINE

## 2021-10-13 PROCEDURE — A9270 NON-COVERED ITEM OR SERVICE: HCPCS | Performed by: INTERNAL MEDICINE

## 2021-10-13 PROCEDURE — 85027 COMPLETE CBC AUTOMATED: CPT

## 2021-10-13 RX ORDER — OMEPRAZOLE 20 MG/1
20 CAPSULE, DELAYED RELEASE ORAL DAILY
Qty: 30 CAPSULE | Refills: 0 | Status: SHIPPED | OUTPATIENT
Start: 2021-10-13 | End: 2021-12-06 | Stop reason: SDUPTHER

## 2021-10-13 RX ORDER — ONDANSETRON 4 MG/1
4 TABLET, ORALLY DISINTEGRATING ORAL EVERY 6 HOURS PRN
Qty: 10 TABLET | Refills: 0 | Status: SHIPPED | OUTPATIENT
Start: 2021-10-13 | End: 2023-11-22

## 2021-10-13 RX ORDER — DEXAMETHASONE 6 MG/1
6 TABLET ORAL DAILY
Qty: 4 TABLET | Refills: 0 | Status: SHIPPED | OUTPATIENT
Start: 2021-10-14 | End: 2021-10-18

## 2021-10-13 RX ORDER — GUAIFENESIN 600 MG/1
600 TABLET, EXTENDED RELEASE ORAL EVERY 12 HOURS
Qty: 28 TABLET | Refills: 0 | Status: SHIPPED | OUTPATIENT
Start: 2021-10-13 | End: 2022-01-03

## 2021-10-13 RX ORDER — ACETAMINOPHEN 325 MG/1
650 TABLET ORAL ONCE
Status: COMPLETED | OUTPATIENT
Start: 2021-10-13 | End: 2021-10-13

## 2021-10-13 RX ORDER — ZINC SULFATE 50(220)MG
220 CAPSULE ORAL DAILY
Status: DISCONTINUED | OUTPATIENT
Start: 2021-10-13 | End: 2021-10-13 | Stop reason: HOSPADM

## 2021-10-13 RX ORDER — ASCORBIC ACID 500 MG
500 TABLET ORAL 2 TIMES DAILY
Status: DISCONTINUED | OUTPATIENT
Start: 2021-10-13 | End: 2021-10-13 | Stop reason: HOSPADM

## 2021-10-13 RX ORDER — DIPHENHYDRAMINE HCL 25 MG
25 TABLET ORAL ONCE
Status: COMPLETED | OUTPATIENT
Start: 2021-10-13 | End: 2021-10-13

## 2021-10-13 RX ORDER — SODIUM CHLORIDE 9 MG/ML
INJECTION, SOLUTION INTRAVENOUS CONTINUOUS
Status: DISCONTINUED | OUTPATIENT
Start: 2021-10-13 | End: 2021-10-13 | Stop reason: HOSPADM

## 2021-10-13 RX ORDER — VITAMIN B COMPLEX
2000 TABLET ORAL DAILY
Status: DISCONTINUED | OUTPATIENT
Start: 2021-10-13 | End: 2021-10-13 | Stop reason: HOSPADM

## 2021-10-13 RX ADMIN — INSULIN HUMAN 5 UNITS: 100 INJECTION, SOLUTION PARENTERAL at 08:16

## 2021-10-13 RX ADMIN — DOCUSATE SODIUM 50 MG AND SENNOSIDES 8.6 MG 2 TABLET: 8.6; 5 TABLET, FILM COATED ORAL at 05:15

## 2021-10-13 RX ADMIN — INSULIN HUMAN 5 UNITS: 100 INJECTION, SOLUTION PARENTERAL at 12:40

## 2021-10-13 RX ADMIN — OXYCODONE HYDROCHLORIDE AND ACETAMINOPHEN 500 MG: 500 TABLET ORAL at 05:15

## 2021-10-13 RX ADMIN — IOHEXOL 70 ML: 350 INJECTION, SOLUTION INTRAVENOUS at 00:30

## 2021-10-13 RX ADMIN — ACETAMINOPHEN 650 MG: 325 TABLET ORAL at 00:14

## 2021-10-13 RX ADMIN — ZINC SULFATE 220 MG (50 MG) CAPSULE 220 MG: CAPSULE at 05:15

## 2021-10-13 RX ADMIN — LISINOPRIL 10 MG: 10 TABLET ORAL at 05:15

## 2021-10-13 RX ADMIN — APIXABAN 10 MG: 5 TABLET, FILM COATED ORAL at 02:15

## 2021-10-13 RX ADMIN — DEXAMETHASONE 6 MG: 4 TABLET ORAL at 05:16

## 2021-10-13 RX ADMIN — SODIUM CHLORIDE: 9 INJECTION, SOLUTION INTRAVENOUS at 09:45

## 2021-10-13 RX ADMIN — TAMSULOSIN HYDROCHLORIDE 0.4 MG: 0.4 CAPSULE ORAL at 08:17

## 2021-10-13 RX ADMIN — Medication 2000 UNITS: at 05:15

## 2021-10-13 RX ADMIN — DIPHENHYDRAMINE HYDROCHLORIDE 25 MG: 25 TABLET ORAL at 00:14

## 2021-10-13 NOTE — DISCHARGE PLANNING
@1340  Agency/Facility Name: [facility]  Spoke To: Lb  Outcome: Accepted and will have [facility] deliver.    Received Choice form at 1020  Agency/Facility Name: [facility]  Referral sent per Choice form @ 102

## 2021-10-13 NOTE — DISCHARGE SUMMARY
Discharge Summary    CHIEF COMPLAINT ON ADMISSION  Chief Complaint   Patient presents with   • Coronavirus Screening   • Weakness   • Shortness of Breath   • Sore Throat       Reason for Admission  COVID+     Admission Date  10/12/2021    CODE STATUS  Full Code    HPI & HOSPITAL COURSE  This is a 65 y.o. male here with shortness of breath.  Patient had a COVID-19 PCR test positive a week ago.  Was placed on 5 days course of oral steroids (unclear but patient thinks dexamethasone) and also received Regeneron infusion.  However, his symptoms gradually worsen over the course of of time and his shortness of breath worsened.  In ER noted to be hypoxic and was placed on 2 L of oxygen via nasal cannula to maintain adequate oxygen saturation.  Was hemodynamically stable.  Afebrile.  D-dimer was elevated.  CT of the chest showed isolated peripheral pulmonary emboli in the right lower pulmonary lobe and bilateral peripheral opacities consistent with COVID-19 pneumonia.  There was mild elevation of the right ventricle/left ventricle pressure ratio.  Echocardiogram was done which showed normal right ventricular size and systolic function normal right atrial size and estimated RVSP of 28 mmHg.  His left ventricle had normal wall thickening and systolic function.  Estimated ejection fraction 65% with normal regional wall motion.  Patient was placed on DEXA methadone orally during this hospital stay.  Home oxygen evaluation was done and home oxygen was ordered and delivered.  Noted to have worsening hyperglycemia with steroids on board.  Patient was advised to hold Metformin for 48 hours after his CTA of the chest.  Advised to follow-up closely with PCP and monitor his blood glucoses closely.  Januvia was added upon discharge.  Continue to be hemodynamically clinically stable.  He was cleared for discharge from medical standpoint.    Therefore, he is discharged in fair and stable condition to home with close outpatient  follow-up.    The patient recovered much more quickly than anticipated on admission.    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 [facility]     Alvin Esquivel M.D.  [address]  [place] [id number]  [phone]    Schedule an appointment as soon as possible for a visit  For a follow up      MEDICATIONS ON DISCHARGE     Medication List      START taking these medications      Instructions   * apixaban 5mg Tabs  Commonly known as: ELIQUIS   Take 2 Tablets by mouth 2 times a day. Indications: DVT/PE  Dose: 10 mg     * apixaban 5mg Tabs  Start taking on: October 20, 2021  Commonly known as: ELIQUIS   Take 1 Tablet by mouth 2 times a day. Indications: DVT/PE  Dose: 5 mg     dexamethasone 6 MG Tabs  Start taking on: October 14, 2021  Commonly known as: DECADRON   Take 1 Tablet by mouth every day for 4 days.  Dose: 6 mg     guaiFENesin  MG Tb12  Commonly known as: Mucinex   Take 1 Tablet by mouth every 12 hours.  Dose: 600 mg     omeprazole 20 MG delayed-release capsule  Commonly known as: PRILOSEC   Take 1 Capsule by mouth every day.  Dose: 20 mg     ondansetron 4 MG Tbdp  Commonly known as: ZOFRAN ODT   Take 1 Tablet by mouth every 6 hours as needed for Nausea (give PO if  no IV route available).  Dose: 4 mg     SITagliptin 50 MG Tabs  Commonly known as: Januvia   Take 1 Tablet by mouth every day.  Dose: 50 mg         * This list has 2 medication(s) that are the same as other medications prescribed for you. Read the directions carefully, and ask your doctor or other care provider to review them with you.            CHANGE how you take these medications      Instructions   albuterol 108 (90 Base) MCG/ACT Aers inhalation aerosol  What changed:   · how much to take  · when to take this  · reasons to take this   Doctor's comments: Please choose whichever albuterol is covered by insurance company: either Ventolin, ProAir or Proventil  USE 2 INHALATIONS EVERY 6 HOURS AS NEEDED FOR SHORTNESS OF BREATH     Trulicity 1.5 MG/0.5ML Sopn  What changed: when to take this  Generic drug: Dulaglutide   Doctor's comments: This rx was submitted by a pharmacist working under a collaborative practice agreement.  Inject 0.5 mL under the skin every 7 days.  Dose: 0.5 mL        CONTINUE taking these medications      Instructions   Jardiance 25 MG Tabs  Generic drug: Empagliflozin   Doctor's comments: Pt to make appt prior to more refills.  TAKE 1 TABLET DAILY     lisinopril 10 MG Tabs  Commonly known as: PRINIVIL   Take 1 Tablet by mouth every day.  Dose: 10 mg     tamsulosin 0.4 MG capsule  Commonly known as: FLOMAX   Take 1 Capsule by mouth 1/2 hour after breakfast.  Dose: 0.4 mg     timolol 0.5 % Soln  Commonly known as: TIMOPTIC   Administer 1 Drop into both eyes every day.  Dose: 1 Drop     Vitamin D3 2000 UNIT Caps   Take 1 Capsule by mouth every day.  Dose: 1 Each        STOP taking these medications    metFORMIN  MG Tb24  Commonly known as: GLUCOPHAGE XR            Allergies  Allergies   Allergen Reactions   • Penicillin G Swelling       DIET  Orders Placed This Encounter   Procedures   • Diet Order Diet: Consistent CHO (Diabetic)     Standing Status:   Standing     Number of Occurrences:   1      Order Specific Question:   Diet:     Answer:   Consistent CHO (Diabetic) [4]       ACTIVITY  As tolerated.  Weight bearing as tolerated    CONSULTATIONS  None    PROCEDURES  None    LABORATORY  Lab Results   Component Value Date    SODIUM 134 (L) 10/13/2021    POTASSIUM 4.6 10/13/2021    CHLORIDE 101 10/13/2021    CO2 18 (L) 10/13/2021    GLUCOSE 327 (H) 10/13/2021    BUN 29 (H) 10/13/2021    CREATININE 0.92 10/13/2021        Lab Results   Component Value Date    WBC 5.3 10/13/2021    HEMOGLOBIN 12.2 (L) 10/13/2021    HEMATOCRIT 33.3 (L) 10/13/2021    PLATELETCT 351 10/13/2021        Total time of the discharge process exceeds 35 minutes.

## 2021-10-13 NOTE — H&P
Hospital Medicine History & Physical Note    Date of Service  10/12/2021    Primary Care Physician  Alvin Esquivel M.D.    Consultants  none    Code Status  Full Code    Chief Complaint  Chief Complaint   Patient presents with   • Coronavirus Screening   • Weakness   • Shortness of Breath   • Sore Throat       History of Presenting Illness  Jerome Mendoza is a 65 y.o. male who presented 10/12/2021 with worsening SOB. Patient was diagnosed with covid more than a week ago. He states that he was given 2x doses of regeneron and felt like his symptom was improving. Then his bodyaches came back associated with dry throat and cough and sob. He came to ED and was hypoxic in low 80s, requiring 2L of NC at this time. He is not vaccinated. He states that he was given 5 days of steroids which also seemed to help. Will admit for hypoxia.     I discussed the plan of care with patient.    Review of Systems  Review of Systems   Constitutional: Negative.    HENT: Negative.    Eyes: Negative.    Respiratory: Positive for cough and shortness of breath.    Cardiovascular: Negative.    Gastrointestinal: Negative.    Genitourinary: Negative.    Musculoskeletal: Positive for myalgias.   Skin: Negative.    Neurological: Positive for weakness.   Psychiatric/Behavioral: Negative.        Past Medical History   has a past medical history of Asthma (6/19/2014), Hearing loss of both ears (6/19/2014), Hyperlipidemia (6/19/2014), Lower urinary tract symptoms (LUTS) (11/17/2020), Type II or unspecified type diabetes mellitus without mention of complication, not stated as uncontrolled (6/19/2014), and Wears hearing aid (6/19/2014).    Surgical History   has a past surgical history that includes tonsillectomy and adenoidectomy and vasectomy.     Family History  family history includes Asthma in his mother; Diabetes in his father; Psychiatric Illness in his father.   Family history reviewed with patient. There is no family history that is  pertinent to the chief complaint.     Social History   reports that he has never smoked. He has never used smokeless tobacco. He reports that he does not drink alcohol and does not use drugs.    Allergies  Allergies   Allergen Reactions   • Penicillin G Swelling       Medications  Prior to Admission Medications   Prescriptions Last Dose Informant Patient Reported? Taking?   Cholecalciferol (VITAMIN D3) 2000 UNIT Cap 10/12/2021 at 0900 Patient No No   Sig: Take 1 Capsule by mouth every day.   Dulaglutide (TRULICITY) 1.5 MG/0.5ML Solution Pen-injector 10/8/2021 at 0930 Patient No No   Sig: Inject 0.5 mL under the skin every 7 days.   Patient taking differently: Inject 0.5 mL under the skin every Friday.   Empagliflozin (JARDIANCE) 25 MG Tab 10/12/2021 at 0900 Patient No No   Sig: TAKE 1 TABLET DAILY   albuterol 108 (90 Base) MCG/ACT Aero Soln inhalation aerosol 10/12/2021 at 1100 Patient No No   Sig: USE 2 INHALATIONS EVERY 6 HOURS AS NEEDED FOR SHORTNESS OF BREATH   Patient taking differently: 2 Puffs every 6 hours as needed for Shortness of Breath. USE 2 INHALATIONS EVERY 6 HOURS AS NEEDED FOR SHORTNESS OF BREATH   lisinopril (PRINIVIL) 10 MG Tab 10/12/2021 at 0900 Patient No No   Sig: Take 1 Tablet by mouth every day.   metFORMIN ER (GLUCOPHAGE XR) 750 MG TABLET SR 24 HR 10/12/2021 at 0900 Patient No No   Sig: Take 1 Tablet by mouth 2 times a day.   tamsulosin (FLOMAX) 0.4 MG capsule 10/12/2021 at 0900 Patient No No   Sig: Take 1 Capsule by mouth 1/2 hour after breakfast.   timolol (TIMOPTIC) 0.5 % Solution 10/12/2021 at 0900 Patient Yes No   Sig: Administer 1 Drop into both eyes every day.      Facility-Administered Medications: None       Physical Exam  Temp:  [36.1 °C (97 °F)-37.4 °C (99.3 °F)] 37.4 °C (99.3 °F)  Pulse:  [] 87  Resp:  [16-24] 24  BP: (103-118)/(57-70) 106/57  SpO2:  [89 %-92 %] 91 %  Blood Pressure : 106/57   Temperature: 37.4 °C (99.3 °F)   Pulse: 87   Respiration: (!) 24   Pulse  Oximetry: 91 %       Physical Exam  Vitals and nursing note reviewed.   Constitutional:       General: He is not in acute distress.     Appearance: Normal appearance. He is not ill-appearing.   HENT:      Head: Normocephalic and atraumatic.      Mouth/Throat:      Mouth: Mucous membranes are moist.      Pharynx: Oropharynx is clear.   Eyes:      General: No scleral icterus.     Extraocular Movements: Extraocular movements intact.      Conjunctiva/sclera: Conjunctivae normal.      Pupils: Pupils are equal, round, and reactive to light.   Cardiovascular:      Rate and Rhythm: Normal rate and regular rhythm.      Pulses: Normal pulses.      Heart sounds: Normal heart sounds.   Pulmonary:      Effort: Pulmonary effort is normal. No respiratory distress.      Breath sounds: Normal breath sounds.   Abdominal:      General: Abdomen is flat. Bowel sounds are normal. There is no distension.      Palpations: Abdomen is soft.      Tenderness: There is no abdominal tenderness. There is no guarding.   Musculoskeletal:         General: No swelling or tenderness. Normal range of motion.      Cervical back: Normal range of motion and neck supple. No rigidity.   Skin:     General: Skin is warm and dry.      Coloration: Skin is not jaundiced or pale.   Neurological:      Mental Status: He is alert and oriented to person, place, and time. Mental status is at baseline.      Motor: No weakness.   Psychiatric:         Mood and Affect: Mood normal.         Behavior: Behavior normal.         Thought Content: Thought content normal.         Judgment: Judgment normal.         Laboratory:  Recent Labs     10/12/21  1629   WBC 7.3   RBC 4.13*   HEMOGLOBIN 13.4*   HEMATOCRIT 40.0*   MCV 96.9   MCH 32.4   MCHC 33.5*   RDW 42.5   PLATELETCT 395   MPV 8.9*     Recent Labs     10/12/21  1629   SODIUM 133*   POTASSIUM 4.5   CHLORIDE 97   CO2 14*   GLUCOSE 200*   BUN 25*   CREATININE 1.02   CALCIUM 9.5     Recent Labs     10/12/21  1629   ALTSGPT 7    ASTSGOT 9*   ALKPHOSPHAT 51   TBILIRUBIN 0.4   GLUCOSE 200*         No results for input(s): NTPROBNP in the last 72 hours.      No results for input(s): TROPONINT in the last 72 hours.    Imaging:  DX-CHEST-PORTABLE (1 VIEW)   Final Result      Airspace opacity throughout the left lung and at that the right lung base compatible with multifocal pneumonia.          X-Ray:  I have personally reviewed the images and compared with prior images.    Assessment/Plan:  I anticipate this patient will require at least two midnights for appropriate medical management, necessitating inpatient admission.    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

## 2021-10-13 NOTE — ED NOTES
Med rec updated and complete.  Allergies reviewed. Per bedside interview with pt .  Pt denies antibiotic use in last 30 days.    Home pharmacy [facility] [phone]             • timolol (TIMOPTIC) 0.5 % Solution Administer 1 Drop into both eyes every day.   • Dulaglutide (TRULICITY) 1.5 MG/0.5ML Solution Pen-injector Inject 0.5 mL under the skin every 7 days. (Patient taking differently: Inject 0.5 mL under the skin every Friday.)   • Empagliflozin (JARDIANCE) 25 MG Tab TAKE 1 TABLET DAILY   • lisinopril (PRINIVIL) 10 MG Tab Take 1 Tablet by mouth every day.   • tamsulosin (FLOMAX) 0.4 MG capsule Take 1 Capsule by mouth 1/2 hour after breakfast.   • Cholecalciferol (VITAMIN D3) 2000 UNIT Cap Take 1 Capsule by mouth every day.   • metFORMIN ER (GLUCOPHAGE XR) 750 MG TABLET SR 24 HR Take 1 Tablet by mouth 2 times a day.        • albuterol 108 (90 Base) MCG/ACT Aero Soln inhalation aerosol USE 2 INHALATIONS EVERY 6 HOURS AS NEEDED FOR SHORTNESS OF BREATH (Patient taking differently: 2 Puffs every 6 hours as needed for Shortness of Breath. USE 2 INHALATIONS EVERY 6 HOURS AS NEEDED FOR SHORTNESS OF BREATH)

## 2021-10-13 NOTE — ASSESSMENT & PLAN NOTE
Elevated Ddimer. Subsequent CT shows isolated peripheral emboli RLL with mild elevation of RV/LV ratio  - Eliquis BID  - Echocardiogram

## 2021-10-13 NOTE — DISCHARGE INSTRUCTIONS
Discharge Instructions    Discharged to home by car with relative. Discharged via wheelchair, hospital escort: Yes.  Special equipment needed: Oxygen    Be sure to schedule a follow-up appointment with your primary care doctor or any specialists as instructed.     Discharge Plan:   Diet Plan: Discussed  Activity Level: Discussed  Confirmed Follow up Appointment: Patient to Call and Schedule Appointment  Confirmed Symptoms Management: Discussed  Medication Reconciliation Updated: Yes    I understand that a diet low in cholesterol, fat, and sodium is recommended for good health. Unless I have been given specific instructions below for another diet, I accept this instruction as my diet prescription.   Other diet: Diabetic    Special Instructions: None    · Is patient discharged on Warfarin / Coumadin?   No     Depression / Suicide Risk    As you are discharged from this [facility] facility, it is important to learn how to keep safe from harming yourself.    Recognize the warning signs:  · Abrupt changes in personality, positive or negative- including increase in energy   · Giving away possessions  · Change in eating patterns- significant weight changes-  positive or negative  · Change in sleeping patterns- unable to sleep or sleeping all the time   · Unwillingness or inability to communicate  · Depression  · Unusual sadness, discouragement and loneliness  · Talk of wanting to die  · Neglect of personal appearance   · Rebelliousness- reckless behavior  · Withdrawal from people/activities they love  · Confusion- inability to concentrate     If you or a loved one observes any of these behaviors or has concerns about self-harm, here's what you can do:  · Talk about it- your feelings and reasons for harming yourself  · Remove any means that you might use to hurt yourself (examples: pills, rope, extension cords, firearm)  · Get professional help from the community (Mental Health, Substance Abuse, psychological  counseling)  · Do not be alone:Call your Safe Contact- someone whom you trust who will be there for you.  · Call your local CRISIS HOTLINE 211-2282 or 333-392-8194  · Call your local Children's Mobile Crisis Response Team [place] (343) 473-2459 or www.[employer]  · Call the toll free National Suicide Prevention Hotlines   · National Suicide Prevention Lifeline 427-040-DSAL (5735)  · National Hope Line Network 800-SUICIDE (739-1355)        Infection Prevention in the Home  If you have an infection, may have been exposed to an infection, or are taking care of someone who has an infection, it is important to know how to keep the infection from spreading. Follow your health care provider's instructions and use these guidelines to help stop the spread of infection.  How infections are spread  In order for an infection to spread, the following must be present:  · A germ. This may be a virus, bacteria, fungus, or parasite.  · A place for the germ to live. This may be:  ? On or in a person, animal, plant, or food.  ? In soil or water.  ? On surfaces, such as a door handle.  · A person or animal who can develop a disease if the germ enters the body (host). The host does not have resistance to the germ.  · A way for the germ to enter the host. This may occur by:  ? Direct contact with an infected person or animal. This can happen through shaking hands or hugging. Some germs can also travel through the air and spread to others. This can happen when an infected person coughs or sneezes on or near other people.  ? Indirect contact. This occurs when the germ enters the host through contact with an infected object. Examples include:  § Eating or drinking food or water that has the germ (is contaminated).  § Touching a contaminated surface with your hands, and then touching your face, eyes, nose, or mouth.  Supplies needed:  · Soap.  · Alcohol-based hand .  · Standard cleaning products.  · Disinfectants, such  as bleach.  · Reusable cleaning cloths, sponges, or paper towels.  · Disposable or reusable utility gloves.  How to prevent infection from spreading  There are several things that you can do to help prevent infection from spreading.  Take these general actions  Everyone should take the following actions to prevent the spread of infection:  · Wash your hands often with soap and water for at least 20 seconds. If soap and water are not available, use alcohol-based hand .  · Avoid touching your face, mouth, nose, or eyes.  · Cough or sneeze into a tissue, sleeve, or elbow instead of into your hand or into the air.  ? If you cough or sneeze into a tissue, throw it away immediately and wash your hands.    Keep your bathroom clean  · Provide soap.  · Change towels and washcloths frequently.  · Change toothbrushes often and store them separately in a clean, dry place.  · Clean and disinfect all surfaces, including the toilet, floor, tub, shower, and sink.  · Do not share personal items, such as razors, toothbrushes, deodorant, alejandro, brushes, towels, and washcloths.  Maintain hygiene in the kitchen    · Wash your hands before and after preparing food and before you eat.  · Clean the inside of your refrigerator each week.  · Keep your refrigerator set at 40°F (4°C) or less, and set your freezer at 0°F (-18°C) or less.  · Keep work surfaces clean. Disinfect them regularly.  · Wash your dishes in hot, soapy water. Air-dry your dishes or use a .  · Do not share dishes or eating utensils.  Handle food safely  · Store food carefully.  · Refrigerate leftovers promptly in covered containers.  · Throw out stale or spoiled food.  · Thaw foods in the refrigerator or microwave, not at room temperature.  · Serve foods at the proper temperature. Do not eat raw meat. Make sure it is cooked to the appropriate temperature. Cook eggs until they are firm.  · Wash fruits and vegetables under running water.  · Use separate  cutting boards, plates, and utensils for raw foods and cooked foods.  · Use a clean spoon each time you sample food while cooking.  Do laundry the right way  · Wear gloves if laundry is visibly soiled.  · Do not shake soiled laundry. Doing that may send germs into the air.  · Wash laundry in hot water.  · If you cannot wash the laundry right away, place it in a plastic bag and wash it as soon as possible.  Be careful around animals and pets  · Wash your hands before and after touching animals.  · If you have a pet, ensure that your pet stays clean. Do not let people with weak immune systems touch bird droppings, fish tank water, or a litter box.  ? If you have a pet cage or litter box, be sure to clean it every day.  · If you are sick, stay away from animals and have someone else care for them if possible.  How to clean and disinfect objects and surfaces  Precautions  · Some disinfectants work for certain germs and not others. Read the 's instructions or read online resources to determine if the product you are using will work for the germ you are trying to remove.  · If you choose to use bleach, use it safely. Never mix it with other cleaning products, especially those that contain ammonia. This mixture can create a dangerous gas that may be deadly.  · Keep proper movement of fresh air in your home (ventilation).  · Pour used mop water down the utility sink or toilet. Do not pour this water down the kitchen sink.  Objects and surfaces    · If surfaces are visibly soiled, clean them first with soap and water before disinfecting.  · Disinfect surfaces that are frequently touched every day. This may include:  ? Counters.  ? Tables.  ? Doorknobs.  ? Sinks and faucets.  ? Electronics, such as:  § Phones.  § Remote controls.  § Keyboards.  § Computers and tablets.  Cleaning supplies  Some cleaning supplies can breed germs. Take good care of them to prevent germs from spreading. To do this:  · Soak toilet  brushes, mops, and sponges in bleach and water for 5 minutes after each use, or according to 's instructions.  · Wash reusable cleaning cloths and sanitize sponges after each use.  · Throw away disposable gloves after one use.  · Replace reusable utility gloves if they are cracked or torn or if they start to peel.  Additional actions if you are sick  If you live with other people:    · Avoid close contact with those around you. Stay at least 3 ft (1 m) away from others, if possible.  · Use a separate bathroom, if possible.  · If possible, sleep in a separate bedroom or in a separate bed to prevent infecting other household members.  ? Change bedroom linens each week or whenever they are soiled.  · Have everyone in the household wash hands often with soap and water. If soap and water are not available, use alcohol-based hand .  In general:  · Stay home except to get medical care. Call ahead before visiting your health care provider.  · Ask others to get groceries and household supplies and to refill prescriptions for you.  · Avoid public areas. Try not to take public transportation.  · If you can, wear a mask if you need to go out of the house, or if you are in close contact with someone who is not sick.  · Avoid visitors until you have completely recovered, or until you have no signs and symptoms of infection.  · Avoid preparing food or providing care for others. If you must prepare food or provide care for others, wear a mask and wash your hands before and after doing these things.  Where to find more information  · Centers for Disease Control and Prevention: www.cdc.gov/nonpharmaceutical-interventions/index.html  · World Health Organization (WHO): www.who.int/infection-prevention/about/en/  · Association for Professionals in Infection Control and Epidemiology: professionals.site.apic.org/settings-of-care/non-healthcare-setting/home/  Summary  · It is important to know how to keep the infection  from spreading.  · Make sure everyone in your household washes their hands often with soap and water.  · Disinfect surfaces that are frequently touched every day.  · If you are sick, stay home except to get medical care.  This information is not intended to replace advice given to you by your health care provider. Make sure you discuss any questions you have with your health care provider.  Document Released: 09/26/2009 Document Revised: 04/14/2020 Document Reviewed: 03/13/2020  Elsevier Patient Education © 2020 Elsevier Inc.

## 2021-10-13 NOTE — FACE TO FACE
"Face to Face Note  -  Durable Medical Equipment    Ashley Quiroga M.D. - NPI: [id number]  I certify that this patient is under my care and that they had a durable medical equipment(DME)face to face encounter by myself that meets the physician DME face-to-face encounter requirements with this patient on:    Date of encounter:   Patient:                    MRN:                       YOB: 2023  Jerome Mendoza  [id number]  1955     The encounter with the patient was in whole, or in part, for the following medical condition, which is the primary reason for durable medical equipment:  Covid-19 Infection    I certify that, based on my findings, the following durable medical equipment is medically necessary:  Oxygen.    HOME O2 Saturation Measurements:(Values must be present for Home Oxygen orders)  Room air sat at rest: 89  Room air sat with amb: 84  With liters of O2: 2, O2 sat at rest with O2: 92  With Liters of O2: 2, O2 sat with amb with O2 : 92  Is the patient mobile?: Yes    My Clinical findings support the need for the above equipment due to:  Hypoxia    Supporting Symptoms: The patient requires supplemental oxygen, as the following interventions have been tried with limited or no improvement: \"Bronchodilators and/or steroid inhalers, \"Ambulation with oximetry and \"Incentive spirometry    If patient feels more short of breath, they can go up to 6 liters per minute and contact healthcare provider.  "

## 2021-10-13 NOTE — RESPIRATORY CARE
REMOTE MONITORING PROGRAM by RESPIRATORY THERAPY  10/13/2021 at 1:15 PM by Robyn Parra     Patient interviewed by RT. Patient agrees to Remote Monitoring program. Device instruction performed with welcome packet, consent signed and placed at bedside chart. Patient instructed on how to use MyChart and Zoom. No questions at this time.     Flyer and education performed on remote monitoring with bedside RN complete.

## 2021-10-13 NOTE — ASSESSMENT & PLAN NOTE
-Was not on home oxygen, currently requiring 2 L  -continue oxygen supplementation  -DC home once oxygen is available for delivery

## 2021-10-13 NOTE — DISCHARGE PLANNING
Anticipated Discharge Disposition:   Home with home oxygen, remote home monitoring    Action:   1017: DME O2 order in place. Received DME choice. Choice form faxed to [facility]. Discussed with bedside RN need for possible remote home monitoring. Bedside RN to ask MD. ECHO result pending.     Addendum:  1119: RN NAMAN called [facility]. Per [facility] they will accept pt but they need to talk to pt about copay. Provided contact information for bedside RN in case they are unable to reach pt.      Barriers to Discharge:   Medical clearance.  DME O2 acceptance and equipment delivery.  Remote home monitoring set up.    Plan:   Hospital Care Management will continue to follow and assist with discharge planning needs.

## 2021-10-14 ENCOUNTER — TELEPHONE (OUTPATIENT)
Dept: OTHER | Facility: MEDICAL CENTER | Age: 66
End: 2021-10-14

## 2021-10-14 VITALS — RESPIRATION RATE: 15 BRPM | OXYGEN SATURATION: 91 % | HEART RATE: 80 BPM

## 2021-10-14 VITALS — OXYGEN SATURATION: 82 %

## 2021-10-14 VITALS — OXYGEN SATURATION: 83 %

## 2021-10-14 NOTE — TELEPHONE ENCOUNTER
Called patient due to low oxygen reading patient stated he's fine and will take some deep breaths.

## 2021-10-14 NOTE — TELEPHONE ENCOUNTER
Called and spoke with patient, he stated he had taken is o2 off to do some stuff around the house because he didn't want to carry around the portable tank and he didn't have the concentrator set up yet. I expressed the importance of setting the concentrator up because the portable tank will run out eventually. He advised me he is going to do that now before he heads to bed.

## 2021-10-14 NOTE — TELEPHONE ENCOUNTER
Call patient due to low oxygen patient stated he's just picking up his medication from the pharmacy before heading back home. No other need at this time.

## 2021-10-14 NOTE — DOCUMENTATION QUERY
[facility]                                                                       Query Response Note      PATIENT:               ASUNCION LAURA  ACCT #:                  [id number]  MRN:                     [id number]  :                      1955  ADMIT DATE:       10/12/2021 2:38 PM  DISCH DATE:        10/13/2021 4:21 PM  RESPONDING  PROVIDER #:        [id number]           QUERY TEXT:    Low sodium levels are noted in the Lab Results. Can a diagnosis be provided to support this finding?    NOTE:  If an appropriate response is not listed below, please respond with a new note.    The patient's Clinical Indicators include:  Sodium 129, 133, 130, 134    Treatment:   IV NS @ 125ml/hr & monitor labs     Risk Factors:   COVID 19 pneumonia, pulmonary embolism, COPD, & hx of DM2     Thank You,  Evi Roland RN BSN  Clinical   Connect via coin4ce  Options provided:   -- Hyponatremia   -- Findings are clinically insignificant   -- Unable to determine      Query created by: Evi Roland on 10/14/2021 9:59 AM    RESPONSE TEXT:    Hyponatremia          Electronically signed by:  GAURAV WILL MD 10/14/2021 4:41 PM

## 2021-10-14 NOTE — TELEPHONE ENCOUNTER
"Patient alerted multiple times due to low oxygen reading. Called patient 3x on phone number in Epic and [employer], called emergency contact 2x call didn't go through went straight to \"this number is not in service\" Called patient and left a voicemail to call back. Patient call back with in 5 mins made him aware of reason of call and why it's important we made contact. Patient understanding it now was not aware of his low oxygen reading stated he doesn't feel any different. Patient on 2 liter of oxygen will call back if he continues to drop down. Patient wanted to notify this caller and others that he has block callers on his phone and only call from the [facility] phone ([phone])   "

## 2021-10-15 ENCOUNTER — TELEPHONE (OUTPATIENT)
Dept: OTHER | Facility: MEDICAL CENTER | Age: 66
End: 2021-10-15

## 2021-10-15 VITALS — OXYGEN SATURATION: 82 %

## 2021-10-15 NOTE — TELEPHONE ENCOUNTER
"Patient called into [facility] phone and was upset about his oxygen concentrator. Right when patient called, he alerted at 82%. Patient stated that the silver ball on the dial of \"big blue box\" is not moving. Then the concentrator started to make a loud screeching noise while on the phone. Asked patient to check the tubing to ensure it was not kinked or blocked and ensure the tubing was connected to the concentrator. Screeching stopped. Asked patient if he could feel the oxygen coming out of the nasal cannula and patient stated he could but the silver ball is still all the way at the bottom and he is unable to move it higher. Asked patient if he could see the phone number on the concentrator to the oxygen provider and he stated he could. Instructed patient to switch over to the portable tank at 2 liters and call the oxygen provider to have them come and look at the concentrator. Patient felt he could not work the tank. Tried to walk patient through the process of turning on the tank, also asked patient if anyone was home with him. He stated his wife was home but sleeping. Patient said he was going to get off of the phone and call the oxygen provider and quickly hung up. Patient currently at 88%. If patient continues to drop, [facility] will reach out wife for assistance.   "

## 2021-10-16 ENCOUNTER — TELEPHONE (OUTPATIENT)
Dept: OTHER | Facility: MEDICAL CENTER | Age: 66
End: 2021-10-16

## 2021-10-16 VITALS — RESPIRATION RATE: 15 BRPM | OXYGEN SATURATION: 84 % | HEART RATE: 81 BPM

## 2021-10-17 ENCOUNTER — TELEPHONE (OUTPATIENT)
Dept: OTHER | Facility: MEDICAL CENTER | Age: 66
End: 2021-10-17

## 2021-10-17 VITALS — RESPIRATION RATE: 35 BRPM | HEART RATE: 102 BPM | OXYGEN SATURATION: 93 %

## 2021-10-17 VITALS — OXYGEN SATURATION: 95 % | HEART RATE: 102 BPM | RESPIRATION RATE: 23 BRPM

## 2021-10-17 NOTE — TELEPHONE ENCOUNTER
At 0940 Disconnected. Contact patient and no answer. Contact Emergency Contact and no answer. EC phone is disconnected. Patient not readmitted. Checked how long it been since an alert from patient. No alert happened within last 4 hours. Waited 15 minutes to restart call escalation. Recalled patient and no answer. Left voicemail to recall us back in 10 minutes or we will call [facility] for a wellness check. No call from patient and EC phone still disconnected. At 1020 Called [facility] to perform wellness check. Patient reconnected to monitor around 1030. Patient sent a text message if he was good. Advised patient that he was reconnected. I asked patient if he had another emergency contact number we can have since the one on file is disconnected incase we can't get a hold of him again. Patient has yet to respond with emergency contact information.

## 2021-10-17 NOTE — TELEPHONE ENCOUNTER
At 1413 Disconnected. Patient called to notify us that he had a blinking red light on his monitor from switching out to a new battery. I talked to him to use alcohol/vinegar to clean chip and battery and reset it up to troubleshoot. Patient than called back after trying saying it wasn't working. We tried troubleshooting for 30 minutes. Patient advised me he had used 2 batteries both had red lights and tried his final battery with same result of not connecting. I stated if he or someone could go to the [facility]  to  more batteries and extra tape for his pulse ox. He advised me he nor anyone could  supplies. I escalated to Charge RN who got approval from the on call pulmonary provider that if patient stays disconnected we can do Q4 check ins with patient. Patient advised us at 1500 that he was able to get a ride to the ER  and  supplies. RT ended up helping him get reconnected at 1630.

## 2021-10-17 NOTE — TELEPHONE ENCOUNTER
Messaged Dr. John for approval for Q4 hour checks:    Kenneth! Jerome Mendoza [id number] on Remote home monitoring is out of finger probes and batteries. Can we get approval for Q4 hour checks until he is able to come in tomorrow for more supplies?    Read 10/17/21, 15:21  Yes please     10/17/21, 15:21  Thank you!!

## 2021-10-18 ENCOUNTER — TELEPHONE (OUTPATIENT)
Dept: OTHER | Facility: MEDICAL CENTER | Age: 66
End: 2021-10-18

## 2021-10-18 VITALS — RESPIRATION RATE: 20 BRPM | OXYGEN SATURATION: 88 % | HEART RATE: 102 BPM

## 2021-10-19 ENCOUNTER — TELEPHONE (OUTPATIENT)
Dept: OTHER | Facility: MEDICAL CENTER | Age: 66
End: 2021-10-19

## 2021-10-19 VITALS — HEART RATE: 79 BPM | OXYGEN SATURATION: 82 % | RESPIRATION RATE: 22 BRPM

## 2021-10-20 ENCOUNTER — TELEPHONE (OUTPATIENT)
Dept: OTHER | Facility: MEDICAL CENTER | Age: 66
End: 2021-10-20

## 2021-10-20 VITALS — HEART RATE: 79 BPM | OXYGEN SATURATION: 97 % | RESPIRATION RATE: 20 BRPM

## 2021-10-20 NOTE — TELEPHONE ENCOUNTER
Called patient, no answer. EC phone is disconnected. Patients o2 sat did come up to 97 before hanging up call.

## 2021-10-20 NOTE — TELEPHONE ENCOUNTER
Call patient due to disconnect from program, ask patient what color is the light on wrist band- flashing blue. Asked patient to please turn off and turn phone back on. Pt now reconnect back to program vital sign charted.

## 2021-10-21 ENCOUNTER — TELEPHONE (OUTPATIENT)
Dept: OTHER | Facility: MEDICAL CENTER | Age: 66
End: 2021-10-21

## 2021-10-21 VITALS — HEART RATE: 73 BPM | RESPIRATION RATE: 20 BRPM | OXYGEN SATURATION: 94 %

## 2021-10-21 VITALS — RESPIRATION RATE: 17 BRPM | OXYGEN SATURATION: 95 % | HEART RATE: 83 BPM

## 2021-10-21 NOTE — TELEPHONE ENCOUNTER
Contacted patient and he let me know that he is still not able to reconnect. Robyn was reached out and she will be able to reconnect him. He will call us when he is in the main ER parking so she could assist him reconnecting. I let him know we will be calling every two hours to do a wellness check and he will also call if he is not feeling well.

## 2021-10-21 NOTE — TELEPHONE ENCOUNTER
Patient called back because he is still unable to connect. Patient forgot his [employer] password and unable to reset. Patient will be contacting [employer] to reset. He will call us back. He is ok and [place] is with him they will call us.

## 2021-10-21 NOTE — TELEPHONE ENCOUNTER
Contacted patient because he was disconnected in our end. Patient answered and let me know that he had removed his oxygen because he was having a headache and he believes it was due to too much oxygen. I let patient know to please view his device because it was not connected in our end. He stated that it was red. I let him know that he needed to change the wrist band, but patient refused because he was not going to get up and change it. I was told he needed to do it but patient refused again and stated he would do it within the hour.

## 2021-10-21 NOTE — TELEPHONE ENCOUNTER
Patient went to the hospital and he was assisted on creating a new account. He logged in successfully and is now connected.

## 2021-10-22 ENCOUNTER — TELEPHONE (OUTPATIENT)
Dept: OTHER | Facility: MEDICAL CENTER | Age: 66
End: 2021-10-22

## 2021-10-22 NOTE — TELEPHONE ENCOUNTER
Patient called because he wanted to let us know he is completely removing his monitor and not putting it back. I gave call to RN who relayed message to Dr. Vallecillo. Dr. Vallecillo gave the ok for the patient to be withdrawn from the program.

## 2021-10-22 NOTE — TELEPHONE ENCOUNTER
Pt is frustrated for spending too much time fixing his phone connectivity today. Pt stated that he doesn't want to be bothered tonight and he will call us if he needs anything. pt said he needs to recover and rest. charge rn notified.

## 2021-11-22 ENCOUNTER — OFFICE VISIT (OUTPATIENT)
Dept: MEDICAL GROUP | Facility: PHYSICIAN GROUP | Age: 66
End: 2021-11-22
Payer: MEDICARE

## 2021-11-22 VITALS
WEIGHT: 140 LBS | HEART RATE: 76 BPM | BODY MASS INDEX: 23.32 KG/M2 | OXYGEN SATURATION: 96 % | HEIGHT: 65 IN | SYSTOLIC BLOOD PRESSURE: 106 MMHG | TEMPERATURE: 97.6 F | DIASTOLIC BLOOD PRESSURE: 54 MMHG

## 2021-11-22 DIAGNOSIS — I26.99 OTHER ACUTE PULMONARY EMBOLISM WITHOUT ACUTE COR PULMONALE (HCC): ICD-10-CM

## 2021-11-22 DIAGNOSIS — R80.9 TYPE 2 DIABETES MELLITUS WITH ALBUMINURIA (HCC): ICD-10-CM

## 2021-11-22 DIAGNOSIS — I15.2 HYPERTENSION ASSOCIATED WITH DIABETES (HCC): ICD-10-CM

## 2021-11-22 DIAGNOSIS — E11.29 TYPE 2 DIABETES MELLITUS WITH ALBUMINURIA (HCC): ICD-10-CM

## 2021-11-22 DIAGNOSIS — Z13.29 SCREENING FOR THYROID DISORDER: ICD-10-CM

## 2021-11-22 DIAGNOSIS — E78.2 MIXED HYPERLIPIDEMIA: ICD-10-CM

## 2021-11-22 DIAGNOSIS — E11.59 HYPERTENSION ASSOCIATED WITH DIABETES (HCC): ICD-10-CM

## 2021-11-22 DIAGNOSIS — Z86.16 HISTORY OF COVID-19: ICD-10-CM

## 2021-11-22 PROBLEM — U07.1 COVID-19: Status: RESOLVED | Noted: 2021-10-12 | Resolved: 2021-11-22

## 2021-11-22 PROBLEM — R09.02 HYPOXIA: Status: RESOLVED | Noted: 2021-10-12 | Resolved: 2021-11-22

## 2021-11-22 PROCEDURE — 99214 OFFICE O/P EST MOD 30 MIN: CPT | Performed by: INTERNAL MEDICINE

## 2021-11-22 RX ORDER — DULAGLUTIDE 1.5 MG/.5ML
0.5 INJECTION, SOLUTION SUBCUTANEOUS
Qty: 12 EACH | Refills: 1 | Status: SHIPPED | OUTPATIENT
Start: 2021-11-22 | End: 2022-01-20

## 2021-11-22 RX ORDER — DULAGLUTIDE 1.5 MG/.5ML
0.5 INJECTION, SOLUTION SUBCUTANEOUS
Qty: 1 EACH | Refills: 6 | Status: SHIPPED | OUTPATIENT
Start: 2021-11-26 | End: 2021-12-23

## 2021-11-22 RX ORDER — LISINOPRIL 10 MG/1
10 TABLET ORAL DAILY
Qty: 90 TABLET | Refills: 3 | Status: SHIPPED | OUTPATIENT
Start: 2021-11-22 | End: 2022-10-26 | Stop reason: SDUPTHER

## 2021-11-22 RX ORDER — ATORVASTATIN CALCIUM 10 MG/1
10 TABLET, FILM COATED ORAL EVERY EVENING
Qty: 90 TABLET | Refills: 3 | Status: SHIPPED | OUTPATIENT
Start: 2021-11-22 | End: 2022-01-20

## 2021-11-22 RX ORDER — METFORMIN HYDROCHLORIDE 750 MG/1
750 TABLET, EXTENDED RELEASE ORAL 2 TIMES DAILY
Qty: 180 TABLET | Refills: 3 | Status: SHIPPED | OUTPATIENT
Start: 2021-11-22 | End: 2022-04-27

## 2021-11-22 ASSESSMENT — FIBROSIS 4 INDEX: FIB4 SCORE: 0.65

## 2021-11-22 NOTE — PROGRESS NOTES
Established Patient    Chief Complaint   Patient presents with   • Follow-Up     3month routine fv    • Other     paperwork to be released from O2   • Medication Management     discuss concerns regarding medications elequis       Subjective:     HPI:   Jerome presents today with the following.    1. Other acute pulmonary embolism without acute cor pulmonale (HCC)  Patient was in hospital October 2021 for Covid pneumonia, also had subsegmental pulmonary embolism, right-sided, started taking blood thinner, currently on Eliquis 5 mg twice daily, need to be finished for 3 months, currently denied having any symptoms    3. 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)  Patient check blood sugar at home early morning, average of 160s, reported compliance with Metformin 750 mg twice daily SR, also mention he is taking Jardiance 25 mg daily, Trulicity 0.5 mg weekly, however patient could not afford for Jardiance and Trulicity because of insurance coverage and out-of-pocket, reported he is following up with ophthalmology as well      Patient Active Problem List    Diagnosis Date Noted   • History of COVID-19 11/22/2021   • Acute pulmonary embolism (HCC) 10/13/2021   • Benign prostatic hyperplasia without lower urinary tract symptoms 08/18/2021   • Vitamin D deficiency 12/28/2020   • Muscle cramps 11/02/2020   • Hypertension associated with diabetes (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) 10/20/2016   • Retinopathy of right eye 08/29/2016   • Microalbuminuria 09/28/2015   • Hyperlipidemia 06/19/2014   • Asthma 06/19/2014   • Wears hearing aid 06/19/2014       Current Outpatient Medications on File Prior to Visit   Medication Sig Dispense Refill   • ondansetron (ZOFRAN ODT) 4 MG TABLET DISPERSIBLE Take 1 Tablet by mouth every 6 hours as needed for Nausea (give PO if no IV route available). 10 Tablet 0   • omeprazole (PRILOSEC) 20 MG delayed-release capsule Take 1 Capsule by mouth every day. 30  "Capsule 0   • guaiFENesin ER (MUCINEX) 600 MG TABLET SR 12 HR Take 1 Tablet by mouth every 12 hours. 28 Tablet 0   • timolol (TIMOPTIC) 0.5 % Solution Administer 1 Drop into both eyes every day.     • Dulaglutide (TRULICITY) 1.5 MG/0.5ML Solution Pen-injector Inject 0.5 mL under the skin every 7 days. (Patient taking differently: Inject 0.5 mL under the skin every Friday.) 12 Each 1   • Empagliflozin (JARDIANCE) 25 MG Tab TAKE 1 TABLET DAILY 90 Tablet 1   • tamsulosin (FLOMAX) 0.4 MG capsule Take 1 Capsule by mouth 1/2 hour after breakfast. 90 Capsule 1   • Cholecalciferol (VITAMIN D3) 2000 UNIT Cap Take 1 Capsule by mouth every day. 90 Capsule 3   • albuterol 108 (90 Base) MCG/ACT Aero Soln inhalation aerosol USE 2 INHALATIONS EVERY 6 HOURS AS NEEDED FOR SHORTNESS OF BREATH (Patient taking differently: 2 Puffs every 6 hours as needed for Shortness of Breath. USE 2 INHALATIONS EVERY 6 HOURS AS NEEDED FOR SHORTNESS OF BREATH) 3 Each 1     No current facility-administered medications on file prior to visit.       Allergies, past medical history, past surgical history, family history, social history reviewed and updated    ROS:  All other systems reviewed and are negative except as stated in the HPI       Physical Exam:     /54 (BP Location: Right arm, Patient Position: Sitting, BP Cuff Size: Adult)   Pulse 76   Temp 36.4 °C (97.6 °F)   Ht 1.651 m (5' 5\")   Wt 63.5 kg (140 lb)   SpO2 96%   BMI 23.30 kg/m²   General: Normal appearing. No distress.  Pulmonary: Clear to ausculation.  Normal effort.   Cardiovascular: Regular rate and rhythm    Assessment and Plan:     66 y.o. male with the following issues.    1. Other acute pulmonary embolism without acute cor pulmonale (HCC)  - apixaban (ELIQUIS) 5mg Tab; Take 1 Tablet by mouth 2 times a day for 60 days.  Dispense: 120 Tablet; Refill: 0  -Continue for 3 months total. Patient understands  -After that he could take aspirin 81 mg daily    3. Type 2 diabetes " mellitus with albuminuria (HCC)  - Referral to Pharmacotherapy Service>> for possibility for affording Trulicity and Jardiance, or other options that is available from the pharmacy service  - HEMOGLOBIN A1C; Future  - metFORMIN ER (GLUCOPHAGE XR) 750 MG TABLET SR 24 HR; Take 1 Tablet by mouth 2 times a day.  Dispense: 180 Tablet; Refill: 3    -Discussed diabetic diet in detail, educated regarding management of hypoglycemia, advised regular exercise, educated disease management and weight goals as well as feet care and frequent check of feet.  -Patient to check early morning fasting blood glucose with goal discussed.  - asked to have a BG log with daily fasting and 2 hr postprandial after biggest meal and bring to next visit or send through my chart  -Discussed side effects of medication. Patient confirmed understanding of information.    4. Mixed hyperlipidemia  - atorvastatin (LIPITOR) 10 MG Tab; Take 1 Tablet by mouth every evening.  Dispense: 90 Tablet; Refill: 3    6. Screening for thyroid disorder  - FREE THYROXINE; Future  - TSH; Future  - THYROID PEROXIDASE  (TPO) AB; Future  - T3 FREE; Future    Follow Up:      Return in about 2 months (around 1/22/2022).  Diabetes, labs,  Please note that this dictation was created using voice recognition software. I have made every reasonable attempt to correct obvious errors, but I expect that there are errors of grammar and possibly content that I did not discover before finalizing the note.    Signed by: Alvin Esquivel M.D.

## 2021-11-23 ENCOUNTER — TELEPHONE (OUTPATIENT)
Dept: MEDICAL GROUP | Facility: PHYSICIAN GROUP | Age: 66
End: 2021-11-23

## 2021-11-23 ENCOUNTER — DOCUMENTATION (OUTPATIENT)
Dept: VASCULAR LAB | Facility: MEDICAL CENTER | Age: 66
End: 2021-11-23

## 2021-11-23 NOTE — PROGRESS NOTES
[facility] and Pharmacotherapy Programs    Received DM pharmacotherapy referral from Dr. Esquivel on 11/22    Scheduled NP for 12/9    Insurance: Medicare  PCP: [facility]  Locations to be seen: Any    [facility] at [phone], fax [phone]    Kourtney Griffith, LitoD

## 2021-11-24 ENCOUNTER — DOCUMENTATION (OUTPATIENT)
Dept: VASCULAR LAB | Facility: MEDICAL CENTER | Age: 66
End: 2021-11-24

## 2021-11-24 ENCOUNTER — ANTICOAGULATION VISIT (OUTPATIENT)
Dept: VASCULAR LAB | Facility: MEDICAL CENTER | Age: 66
End: 2021-11-24
Attending: INTERNAL MEDICINE
Payer: MEDICARE

## 2021-11-24 DIAGNOSIS — I26.99 OTHER ACUTE PULMONARY EMBOLISM WITHOUT ACUTE COR PULMONALE (HCC): ICD-10-CM

## 2021-11-24 DIAGNOSIS — Z86.16 HISTORY OF COVID-19: ICD-10-CM

## 2021-11-24 PROCEDURE — 99213 OFFICE O/P EST LOW 20 MIN: CPT

## 2021-11-24 NOTE — PROGRESS NOTES
Initial anticoagulation clinic note and most recent pulmonary note reviewed.    Pending further recommendations, we will continue with 3 months of oral anticoagulation and then discontinue at the recommendation of pulmonary.    Michael J. Bloch, MD  Anticoagulation Center    Cc:   CECY Vallecillo

## 2021-11-24 NOTE — PROGRESS NOTES
NEW DOAC   .  Anticoagulation Summary  As of 11/24/2021    INR goal:     TTR:  --   INR used for dosing:  No new INR was available at the time of this encounter.   Warfarin maintenance plan:  No maintenance plan   Next INR check:  12/23/2021   Target end date:      Indications    Acute pulmonary embolism (HCC) [I26.99]  History of COVID-19 [Z86.16]             Anticoagulation Episode Summary     INR check location:      Preferred lab:      Send INR reminders to:      Comments:  Eliquis      Anticoagulation Care Providers     Provider Role Specialty Phone number    [facility] Responsible  [phone]        Anticoagulation Patient Findings  Patient Findings     Negatives:  Signs/symptoms of thrombosis, Signs/symptoms of bleeding, Laboratory test error suspected, Change in health, Change in alcohol use, Change in activity, Upcoming invasive procedure, Emergency department visit, Upcoming dental procedure, Missed doses, Extra doses, Change in medications, Change in diet/appetite, Hospital admission, Bruising, Other complaints          PCP: Alvin Esquivel M.D.  Cardiologist: none  Dx: Acute PE  CHADSVASC = n/a  HAS-BLED = 1 (age)  Target End Date = January 2022 per pulmonary Dr Vallecillo (see visit on 10/19)    Pt Hx: Pt was hospitalized in October 2021 for COVID-19 infection and developed a PE. This is pt's first VTE provoked by COVID-19. Pt was started on Eliquis in October. He saw Dr. Vallecillo on 10/19 who recommended 3 months of anticoagulation therapy.    Labs:  Lab Results   Component Value Date/Time    WBC 5.3 10/13/2021 03:46 AM    RBC 3.66 (L) 10/13/2021 03:46 AM    HEMOGLOBIN 12.2 (L) 10/13/2021 03:46 AM    HEMATOCRIT 33.3 (L) 10/13/2021 03:46 AM    MCV 91.0 10/13/2021 03:46 AM    MCH 33.3 (H) 10/13/2021 03:46 AM    MCHC 36.6 (H) 10/13/2021 03:46 AM    MPV 9.1 10/13/2021 03:46 AM    NEUTSPOLYS 83.10 (H) 10/12/2021 04:29 PM    LYMPHOCYTES 8.10 (L) 10/12/2021 04:29 PM    MONOCYTES 5.90  10/12/2021 04:29 PM    EOSINOPHILS 2.10 10/12/2021 04:29 PM    BASOPHILS 0.30 10/12/2021 04:29 PM      Lab Results   Component Value Date/Time    SODIUM 134 (L) 10/13/2021 12:04 PM    POTASSIUM 4.6 10/13/2021 12:04 PM    CHLORIDE 101 10/13/2021 12:04 PM    CO2 18 (L) 10/13/2021 12:04 PM    GLUCOSE 327 (H) 10/13/2021 12:04 PM    BUN 29 (H) 10/13/2021 12:04 PM    CREATININE 0.92 10/13/2021 12:04 PM          Pt is new to Eliquis and new to [facility]. Discussed:   · Indication for DOAC therapy.  · Importance of monitoring and compliance.   · Monitoring parameters, signs and symptoms of bleeding or clotting.  · DOAC therapy, side effects, potential DDIs, OTC medications  · Lifestyle safety, ie smoking, ETOH, hobby safety, fall safety/prevention  · Procedures for missed doses or suspected missed doses, surgeries/procedures, travel, dental work, any medication changes    Pt has already completed loading dose. Pt to continue with Eliquis 5 mg BID    DOAC affordable = NO - but pt will be changing to [facility] as of 1/1/22 - which will make Eliquis more affordable    Samples provided: Yes - provided 6 weeks worth - enough to get through 2021    F/U - 4 weeks in [facility]  Also provided 4 weeks of Trulicity 1.5 mg samples    Kourtney Griffith, PharmD      Added [facility] to care team   Send to Bloch

## 2021-12-06 DIAGNOSIS — U07.1 COVID-19: ICD-10-CM

## 2021-12-06 RX ORDER — OMEPRAZOLE 20 MG/1
20 CAPSULE, DELAYED RELEASE ORAL DAILY
Qty: 30 CAPSULE | Refills: 3 | Status: SHIPPED | OUTPATIENT
Start: 2021-12-06 | End: 2022-05-16

## 2021-12-23 ENCOUNTER — DOCUMENTATION (OUTPATIENT)
Dept: MEDICAL GROUP | Facility: PHYSICIAN GROUP | Age: 66
End: 2021-12-23

## 2021-12-23 ENCOUNTER — ANTICOAGULATION VISIT (OUTPATIENT)
Dept: MEDICAL GROUP | Facility: PHYSICIAN GROUP | Age: 66
End: 2021-12-23
Payer: MEDICARE

## 2021-12-23 VITALS
WEIGHT: 140 LBS | BODY MASS INDEX: 23.32 KG/M2 | HEIGHT: 65 IN | SYSTOLIC BLOOD PRESSURE: 120 MMHG | DIASTOLIC BLOOD PRESSURE: 75 MMHG

## 2021-12-23 DIAGNOSIS — Z86.16 HISTORY OF COVID-19: ICD-10-CM

## 2021-12-23 DIAGNOSIS — I26.99 OTHER ACUTE PULMONARY EMBOLISM WITHOUT ACUTE COR PULMONALE (HCC): ICD-10-CM

## 2021-12-23 DIAGNOSIS — E78.2 MIXED HYPERLIPIDEMIA: ICD-10-CM

## 2021-12-23 PROCEDURE — 93793 ANTICOAG MGMT PT WARFARIN: CPT | Performed by: INTERNAL MEDICINE

## 2021-12-23 ASSESSMENT — FIBROSIS 4 INDEX: FIB4 SCORE: 0.65

## 2021-12-23 NOTE — PROGRESS NOTES
Patient Consult Note  12/23/21    Time 900am - 930    Primary care physician: Alvin Esquivel M.D.    Reason for consult: Management of Uncontrolled Type 2 Diabetes    HPI:  Jerome Mendoza is a 65 y.o. old patient who comes in today for evaluation of above stated problem.    Most Recent HbA1c:   Lab Results   Component Value Date/Time    HBA1C 7.2 (H) 08/02/2021 07:50 AM     Lab Results   Component Value Date/Time    CREATININE 0.92 10/13/2021 12:04 PM       Current Outpatient Medications:   •  omeprazole, 20 mg, Oral, DAILY  •  apixaban, 5 mg, Oral, BID  •  atorvastatin, 10 mg, Oral, Q EVENING  •  lisinopril, 10 mg, Oral, DAILY  •  metFORMIN ER, 750 mg, Oral, BID  •  Trulicity, 0.5 mL, Subcutaneous, Q7 DAYS  •  ondansetron, 4 mg, Oral, Q6HRS PRN  •  guaiFENesin ER, 600 mg, Oral, Q12HRS  •  timolol, 1 Drop, Both Eyes, DAILY  •  Jardiance, TAKE 1 TABLET DAILY  •  tamsulosin, 0.4 mg, Oral, AFTER BREAKFAST  •  Vitamin D3, 1 Each, Oral, DAILY  •  albuterol, USE 2 INHALATIONS EVERY 6 HOURS AS NEEDED FOR SHORTNESS OF BREATH (Patient taking differently: 2 Puff, EVERY 6 HOURS PRN, Shortness of Breath, USE 2 INHALATIONS EVERY 6 HOURS AS NEEDED FOR SHORTNESS OF BREATH)    Diabetes Medication History and Current Regimen  · Metformin: Metformin  twice daily  · GLP-1 Agent: Dulaglutide 1.5 mg once weekly  · SGLT-2 Inhibitor:  Empagliflozin 25 mg once daily     Pt has home glucometer and proper testing technique - yes    Pt reports blood sugars:   Before Breakfast: 180-190    Hypoglycemia awareness - yes  Nocturnal hypoglycemia- no  Hypoglycemia:  None    Pt's treatment of Hypoglycemia    - 15:15 Rule discussed with patient    Current Exercise -he tries to exercise as frequently as possible.  He goes on walks around his neighborhood.  Exercise Goal -30 minutes at least 5 times a week.    Dietary -he is eating more junk food around the holidays.  He says his diet will improve once the holidays are over.    Foot  Exam:  Visual Inspection: Feet without maceration, ulcers, fissures.  Feet dry.  Pedal pulses: intact bilaterally  Complains of rashes on the side of his legs.  He has been using a steroid cream.  Small patches almost look like plaques of eczema.  Outside the scope of my practice, defer to PCP    Preventative Management  BP regimen (ACE/ARB) -lisinopril 10 mg daily  ASA -no  Statin -Lipitor 10 mg  Cardiovascular   Patient Type, check all that apply:   Primary Prevention      Lab Results   Component Value Date/Time    CHOLSTRLTOT 204 (H) 08/02/2021 07:50 AM    CHOLSTRLTOT 242 (H) 10/22/2020 11:35 AM     (H) 10/22/2020 11:35 AM    HDL 36 (L) 08/02/2021 07:50 AM    HDL 31 (A) 10/22/2020 11:35 AM    TRIGLYCERIDE 121 08/02/2021 07:50 AM    TRIGLYCERIDE 218 (H) 10/22/2020 11:35 AM       Lab Results   Component Value Date/Time    SODIUM 134 (L) 10/13/2021 12:04 PM    POTASSIUM 4.6 10/13/2021 12:04 PM    CHLORIDE 101 10/13/2021 12:04 PM    CO2 18 (L) 10/13/2021 12:04 PM    GLUCOSE 327 (H) 10/13/2021 12:04 PM    BUN 29 (H) 10/13/2021 12:04 PM    CREATININE 0.92 10/13/2021 12:04 PM     Lab Results   Component Value Date/Time    ALKPHOSPHAT 50 10/13/2021 12:04 PM    ASTSGOT 10 (L) 10/13/2021 12:04 PM    ALTSGPT 9 10/13/2021 12:04 PM    TBILIRUBIN 0.3 10/13/2021 12:04 PM        Last Retinal Scan -sees a ophthalmologist.  He is up-to-date, but gets shots in his eyes.   Last Foot Exam -regularly examines feet.  No problems to report.    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/20/2016   • Retinopathy of right eye 08/29/2016   •  Microalbuminuria 09/28/2015   • Hyperlipidemia 06/19/2014   • Asthma 06/19/2014   • Wears hearing aid 06/19/2014       Past Surgical History:  Past Surgical History:   Procedure Laterality Date   • TONSILLECTOMY AND ADENOIDECTOMY     • VASECTOMY         Allergies:  Penicillin g    Social History:  Social History     Socioeconomic History   • Marital status:      Spouse name: Not on file   • Number of children: Not on file   • Years of education: Not on file   • Highest education level: Not on file   Occupational History   • Not on file   Tobacco Use   • Smoking status: Never Smoker   • Smokeless tobacco: Never Used   • Tobacco comment: avoid all tobacco products   Vaping Use   • Vaping Use: Never used   Substance and Sexual Activity   • Alcohol use: No     Alcohol/week: 0.0 oz   • Drug use: No   • Sexual activity: Yes     Partners: Female   Other Topics Concern   • Not on file   Social History Narrative   • Not on file     Social Determinants of Health     Financial Resource Strain:    • Difficulty of Paying Living Expenses: Not on file   Food Insecurity:    • Worried About Running Out of Food in the Last Year: Not on file   • Ran Out of Food in the Last Year: Not on file   Transportation Needs:    • Lack of Transportation (Medical): Not on file   • Lack of Transportation (Non-Medical): Not on file   Physical Activity:    • Days of Exercise per Week: Not on file   • Minutes of Exercise per Session: Not on file   Stress:    • Feeling of Stress : Not on file   Social Connections:    • Frequency of Communication with Friends and Family: Not on file   • Frequency of Social Gatherings with Friends and Family: Not on file   • Attends L[employer] Services: Not on file   • Active Member of Clubs or Organizations: Not on file   • Attends Club or Organization Meetings: Not on file   • Marital Status: Not on file   Intimate Partner Violence:    • Fear of Current or Ex-Partner: Not on file   • Emotionally Abused: Not on  file   • Physically Abused: Not on file   • Sexually Abused: Not on file   Housing Stability:    • Unable to Pay for Housing in the Last Year: Not on file   • Number of Places Lived in the Last Year: Not on file   • Unstable Housing in the Last Year: Not on file       Family History:  Family History   Problem Relation Age of Onset   • Asthma Mother    • Diabetes Father    • Psychiatric Illness Father         dementia   • Heart Disease Neg Hx    • Stroke Neg Hx    • Cancer Neg Hx        Medications:    Current Outpatient Medications:   •  omeprazole (PRILOSEC) 20 MG delayed-release capsule, Take 1 Capsule by mouth every day., Disp: 30 Capsule, Rfl: 3  •  apixaban (ELIQUIS) 5mg Tab, Take 1 Tablet by mouth 2 times a day for 60 days., Disp: 120 Tablet, Rfl: 0  •  atorvastatin (LIPITOR) 10 MG Tab, Take 1 Tablet by mouth every evening., Disp: 90 Tablet, Rfl: 3  •  lisinopril (PRINIVIL) 10 MG Tab, Take 1 Tablet by mouth every day., Disp: 90 Tablet, Rfl: 3  •  metFORMIN ER (GLUCOPHAGE XR) 750 MG TABLET SR 24 HR, Take 1 Tablet by mouth 2 times a day., Disp: 180 Tablet, Rfl: 3  •  Dulaglutide (TRULICITY) 1.5 MG/0.5ML Solution Pen-injector, Inject 0.5 mL under the skin every 7 days., Disp: 12 Each, Rfl: 1  •  ondansetron (ZOFRAN ODT) 4 MG TABLET DISPERSIBLE, Take 1 Tablet by mouth every 6 hours as needed for Nausea (give PO if no IV route available)., Disp: 10 Tablet, Rfl: 0  •  guaiFENesin ER (MUCINEX) 600 MG TABLET SR 12 HR, Take 1 Tablet by mouth every 12 hours., Disp: 28 Tablet, Rfl: 0  •  timolol (TIMOPTIC) 0.5 % Solution, Administer 1 Drop into both eyes every day., Disp: , Rfl:   •  Empagliflozin (JARDIANCE) 25 MG Tab, TAKE 1 TABLET DAILY, Disp: 90 Tablet, Rfl: 1  •  tamsulosin (FLOMAX) 0.4 MG capsule, Take 1 Capsule by mouth 1/2 hour after breakfast., Disp: 90 Capsule, Rfl: 1  •  Cholecalciferol (VITAMIN D3) 2000 UNIT Cap, Take 1 Capsule by mouth every day., Disp: 90 Capsule, Rfl: 3  •  albuterol 108 (90 Base) MCG/ACT  Aero Soln inhalation aerosol, USE 2 INHALATIONS EVERY 6 HOURS AS NEEDED FOR SHORTNESS OF BREATH (Patient taking differently: 2 Puffs every 6 hours as needed for Shortness of Breath. USE 2 INHALATIONS EVERY 6 HOURS AS NEEDED FOR SHORTNESS OF BREATH), Disp: 3 Each, Rfl: 1    Labs: Reviewed    Physical Examination:  Vital signs: There were no vitals taken for this visit. There is no height or weight on file to calculate BMI.    Assessment and Plan  -Difficulty affording Trulicity and Jardiance.  Provided him with patient assistance paperwork  -GI side effects have improved since decreasing Metformin and Trulicity.  - Medication   · Metformin: Metformin  twice daily  · GLP-1 Agent: Dulaglutide 1.5 mg once weekly  · SGLT-2 Inhibitor:  Empagliflozin 25 mg once daily     -Blood glucose and A1c target    • However, more aggressive diabetic control is reasonable when tolerated.    2.  Cardiovascular  Order lipid panel  LDL is above goal    - Lifestyle changes     · Focus on eating a Mediterranean-style diet, as seen above  · Exercise 30 minutes daily at least 5 days/week, as tolerated.      Follow-up appointment in 8 weeks     Jese Key, PharmD, MS, BCACP, C    [facility]  Phone [phone] fax [phone]    This note was created using voice recognition software (Dragon). The accuracy of the dictation is limited by the abilities of the software. I have reviewed the note prior to signing, however some errors in grammar and context are still possible. If you have any questions related to this note please do not hesitate to contact our office.     12/10/20    CC:   Alvin Esquivel M.D.  No ref. provider found

## 2021-12-23 NOTE — PROGRESS NOTES
Anticoagulation Summary  As of 12/23/2021    INR goal:     TTR:  --   INR used for dosing:  No new INR was available at the time of this encounter.   Warfarin maintenance plan:  No maintenance plan   Next INR check:  1/3/2022   Target end date:  1/23/2022    Indications    Acute pulmonary embolism (HCC) [I26.99]  History of COVID-19 [Z86.16]             Anticoagulation Episode Summary     INR check location:      Preferred lab:      Send INR reminders to:      Comments:  Eliquis      Anticoagulation Care Providers     Provider Role Specialty Phone number    [facility] Responsible  [phone]        Anticoagulation Patient Findings  Patient Findings     Negatives:  Signs/symptoms of thrombosis, Signs/symptoms of bleeding, Laboratory test error suspected, Change in health, Change in alcohol use, Change in activity, Upcoming invasive procedure, Emergency department visit, Upcoming dental procedure, Missed doses, Extra doses, Change in medications, Change in diet/appetite, Hospital admission, Bruising, Other complaints         no INR per pt     Anticoagulation Patient Findings  Health Status Since Last Assessment  Any new relevant medical problems, ED visits/hospitalizations pertinent to this visit: No   Any embolic events (stroke / TIA / systemic embolism): No  Any recent deep vein thrombosis or pulmonary emboli: No     Adherence with DOAC Therapy  1 or more missed doses in an average week: No  • If yes, number of missed doses- n/a  BLEEDING RISK ASSESSMENT NB:     Bleeding Risk Assessment  Severe epistaxis: No  Hemoptysis: No  Excessive or unusual bruising / hematomas: No  GIB / melena / BRBPR / hematemesis: No  Hematuria: No  Concerning daily headache or subdural hematoma symptoms: No  Decreasing hemoglobin or new anemia: No  EtOH overuse: No  Falls, presyncope, syncope, or seizures: No  Uncontrolled hypertension: No  LFTs   Lab Results   Component Value Date/Time    ALKPHOSPHAT 50 10/13/2021  "12:04 PM    ASTSGOT 10 (L) 10/13/2021 12:04 PM    ALTSGPT 9 10/13/2021 12:04 PM    TBILIRUBIN 0.3 10/13/2021 12:04 PM    ALBUMIN 3.1 (L) 10/13/2021 12:04 PM      Latest hemoglobin:  Lab Results   Component Value Date/Time    WBC 5.3 10/13/2021 03:46 AM    RBC 3.66 (L) 10/13/2021 03:46 AM    HEMOGLOBIN 12.2 (L) 10/13/2021 03:46 AM    HEMATOCRIT 33.3 (L) 10/13/2021 03:46 AM    PLATELETCT 351 10/13/2021 03:46 AM      Vitals:    12/23/21 0906   BP: 120/75   Weight: 63.5 kg (140 lb)   Height: 1.651 m (5' 5\")         Creatinine Clearance/Renal Function  Latest eGFR / creatinine:  Lab Results   Component Value Date/Time    SODIUM 134 (L) 10/13/2021 12:04 PM    POTASSIUM 4.6 10/13/2021 12:04 PM    CHLORIDE 101 10/13/2021 12:04 PM    CO2 18 (L) 10/13/2021 12:04 PM    GLUCOSE 327 (H) 10/13/2021 12:04 PM    BUN 29 (H) 10/13/2021 12:04 PM    CREATININE 0.92 10/13/2021 12:04 PM        • Is eGFR/CrCl less than 50ml/min:  no, 68ml.min      Drug Interactions  Is pt on antiplatelet therapy:no  Is pt on NSAID: no  Other drug interactions: no    Current Outpatient Medications:   •  omeprazole, 20 mg, Oral, DAILY  •  apixaban, 5 mg, Oral, BID  •  atorvastatin, 10 mg, Oral, Q EVENING  •  lisinopril, 10 mg, Oral, DAILY  •  metFORMIN ER, 750 mg, Oral, BID  •  Trulicity, 0.5 mL, Subcutaneous, Q7 DAYS  •  ondansetron, 4 mg, Oral, Q6HRS PRN  •  guaiFENesin ER, 600 mg, Oral, Q12HRS  •  timolol, 1 Drop, Both Eyes, DAILY  •  Jardiance, TAKE 1 TABLET DAILY  •  tamsulosin, 0.4 mg, Oral, AFTER BREAKFAST  •  Vitamin D3, 1 Each, Oral, DAILY  •  albuterol, USE 2 INHALATIONS EVERY 6 HOURS AS NEEDED FOR SHORTNESS OF BREATH (Patient taking differently: 2 Puff, EVERY 6 HOURS PRN, Shortness of Breath, USE 2 INHALATIONS EVERY 6 HOURS AS NEEDED FOR SHORTNESS OF BREATH)       Final Assessment and Recommendations:  Patient appears stable from the anticoagulation standpoint.   Benefits of continued DOAC therapy outweigh risks for this patient.   Continue " Eliquis 5mg bid      Follow:  • Our protocol suggests we test in 2 weeks for lot appt.   • This decision was made using shared decision making with the pt and benefits vs risks were discussed.       Jese Key, PharmD, MS, BCACP, [facility] [place] [facility]  Phone [phone] fax [phone]    This note was created using voice recognition software (Dragon). The accuracy of the dictation is limited by the abilities of the software. I have reviewed the note prior to signing, however some errors in grammar and context are still possible. If you have any questions related to this note please do not hesitate to contact our office.

## 2022-01-03 ENCOUNTER — APPOINTMENT (OUTPATIENT)
Dept: VASCULAR LAB | Facility: MEDICAL CENTER | Age: 67
End: 2022-01-03
Attending: INTERNAL MEDICINE
Payer: MEDICARE

## 2022-01-03 ENCOUNTER — TELEPHONE (OUTPATIENT)
Dept: HEALTH INFORMATION MANAGEMENT | Facility: OTHER | Age: 67
End: 2022-01-03

## 2022-01-03 DIAGNOSIS — Z86.16 HISTORY OF COVID-19: ICD-10-CM

## 2022-01-03 DIAGNOSIS — I26.99 ACUTE PULMONARY EMBOLISM WITHOUT ACUTE COR PULMONALE, UNSPECIFIED PULMONARY EMBOLISM TYPE (HCC): ICD-10-CM

## 2022-01-03 PROBLEM — E78.5 DYSLIPIDEMIA ASSOCIATED WITH TYPE 2 DIABETES MELLITUS (HCC): Status: ACTIVE | Noted: 2022-01-03

## 2022-01-03 PROBLEM — E11.3293 MILD NONPROLIFERATIVE DIABETIC RETINOPATHY OF BOTH EYES WITHOUT MACULAR EDEMA ASSOCIATED WITH TYPE 2 DIABETES MELLITUS (HCC): Status: ACTIVE | Noted: 2022-01-03

## 2022-01-03 PROBLEM — Z86.711 HISTORY OF PULMONARY EMBOLUS (PE): Status: ACTIVE | Noted: 2022-01-03

## 2022-01-03 PROBLEM — U07.1 PULMONARY EMBOLISM ASSOCIATED WITH COVID-19 (HCC): Status: ACTIVE | Noted: 2022-01-03

## 2022-01-03 PROBLEM — I77.9 DISORDER OF ARTERIES AND ARTERIOLES (HCC): Status: ACTIVE | Noted: 2022-01-03

## 2022-01-03 PROBLEM — E11.9 TYPE 2 DIABETES MELLITUS, WITHOUT LONG-TERM CURRENT USE OF INSULIN (HCC): Status: ACTIVE | Noted: 2022-01-03

## 2022-01-03 PROBLEM — E11.69 DYSLIPIDEMIA ASSOCIATED WITH TYPE 2 DIABETES MELLITUS (HCC): Status: ACTIVE | Noted: 2022-01-03

## 2022-01-03 PROCEDURE — 99212 OFFICE O/P EST SF 10 MIN: CPT | Performed by: NURSE PRACTITIONER

## 2022-01-03 NOTE — PATIENT INSTRUCTIONS
- finish off your current supply of Eliquis 5 mg by mouth twice daily  - go to the ER for shortness of breath, chest pain, pain with deep inhalation, worsening leg swelling and/or pain in calf or leg   - let all of your providers know you have a history of a blood clots in your lungs, especially if having any surgeries or hospitalizations  - avoid sedentary periods  - continue complete avoidance of tobacco products  - avoid hormonal therapies including estrogen or testosterone-containing meds, or raloxifene or tamoxifene (commonly used for osteoporosis)  - to avoid Aspirin and anti-inflammatories (eg. Advil, ibuprofen, Aleve, naproxen, etc) while anticoagulated   - elevate legs as much as possible, use compression stockings/socks if directed by your provider  - if any bleeding lasting 30min without stopping, please seek care with your PCP, urgent care, or ED  - if having any invasive procedure, please make sure the doctor knows of your history of blood clots and current anticoagulation status  - keep up with age-appropriate cancer screenings as a small % of blood clots may be caused by an underlying malignancy  - reversal agents for most blood thinners are now available and used if you have major bleeding

## 2022-01-03 NOTE — TELEPHONE ENCOUNTER
Pt called to confirm his appt for [facility] and he is on his way, no to covid symptoms and advised to wear a mask

## 2022-01-03 NOTE — PROGRESS NOTES
"Diagnosis: PE in the setting of COVID-19  Drug: Eliquis 5 mg BID  LOT: 3 months then discontinue per pulm (~1/13/22)  CHADSVASC: 0  HAS-BLED: 1 (age)    Health Status Since Last Assessment  Any new relevant medical problems, ED visits/hospitalizations - no  Any embolic events (stroke / TIA / systemic embolism) - no  Patient with hx of \"isolated peripheral pulmonary emboli right lower pulmonary lobe\" in the setting of COVID-19. Denies any prior hx of blood clots. No FH. No hormone or tobacco use. No personal hx of malignancy. He is taking Eliquis 5 mg BID as instructed. Denies any current SOB or CP. He has resumed his normal physical activities. No reports of LE pain/swelling.    Adherence with DOAC Therapy  0 missed dose(s)  BLEEDING RISK ASSESSMENT NB:    Bleeding Risk Assessment  Severe epistaxis - no   Hemoptysis - no  Excessive or unusual bruising / hematomas - no  GIB/melena/BRBPR/hematemesis - no  Hematuria - no   Abnormal vaginal bleeding - n/a  Concerning daily headache or subdural hematoma symptoms - no  Decreasing hemoglobin or new anemia - no  Falls, presyncope, syncope, or seizures - no  Platelets: 351  Latest hemoglobin:     Lab Results   Component Value Date/Time    WBC 5.3 10/13/2021 03:46 AM    RBC 3.66 (L) 10/13/2021 03:46 AM    HEMOGLOBIN 12.2 (L) 10/13/2021 03:46 AM    HEMATOCRIT 33.3 (L) 10/13/2021 03:46 AM    MCV 91.0 10/13/2021 03:46 AM    MCH 33.3 (H) 10/13/2021 03:46 AM    MCHC 36.6 (H) 10/13/2021 03:46 AM    MPV 9.1 10/13/2021 03:46 AM    NEUTSPOLYS 83.10 (H) 10/12/2021 04:29 PM    LYMPHOCYTES 8.10 (L) 10/12/2021 04:29 PM    MONOCYTES 5.90 10/12/2021 04:29 PM    EOSINOPHILS 2.10 10/12/2021 04:29 PM    BASOPHILS 0.30 10/12/2021 04:29 PM        HAS-BLED:  Hypertension (uncontrolled, >160 mmHg systolic) - no  Renal disease (dialysis, transplant, Cr >2.26 mg/dL or >200 µmol/L) - no  Liver disease (cirrhosis or bilirubin >2x normal with AST/ALT/AP >3x normal) - no  Stroke history - no  Prior " major bleeding or predisposition to bleeding - no  Labile INR Unstable/high INRs, time in therapeutic range <60% - no  Age >65 - yes  Medication usage predisposing to bleeding (aspirin, clopidogrel, NSAIDs) - no  Alcohol use  ?8 drinks/week - no      Creatinine Clearance/Renal Function  Latest eGFR / creatinine:  Lab Results   Component Value Date/Time    SODIUM 134 (L) 10/13/2021 12:04 PM    POTASSIUM 4.6 10/13/2021 12:04 PM    CHLORIDE 101 10/13/2021 12:04 PM    CO2 18 (L) 10/13/2021 12:04 PM    GLUCOSE 327 (H) 10/13/2021 12:04 PM    BUN 29 (H) 10/13/2021 12:04 PM    CREATININE 0.92 10/13/2021 12:04 PM      • Is eGFR less than 50ml/min  no  Cr cl ~72 ml/min    If YES, calculate CrCl (see back)  Any recent dehydrating illness or medications added/changed? i.e. Diuretics      Drug Interactions  ASA/antiplatelets - avoids  NSAID - avoids  Other drug interactions -  (Review med list / OTCs;)  Current Outpatient Medications on File Prior to Visit   Medication Sig Dispense Refill   • omeprazole (PRILOSEC) 20 MG delayed-release capsule Take 1 Capsule by mouth every day. 30 Capsule 3   • apixaban (ELIQUIS) 5mg Tab Take 1 Tablet by mouth 2 times a day for 60 days. 120 Tablet 0   • atorvastatin (LIPITOR) 10 MG Tab Take 1 Tablet by mouth every evening. 90 Tablet 3   • lisinopril (PRINIVIL) 10 MG Tab Take 1 Tablet by mouth every day. 90 Tablet 3   • metFORMIN ER (GLUCOPHAGE XR) 750 MG TABLET SR 24 HR Take 1 Tablet by mouth 2 times a day. 180 Tablet 3   • Dulaglutide (TRULICITY) 1.5 MG/0.5ML Solution Pen-injector Inject 0.5 mL under the skin every 7 days. 12 Each 1   • ondansetron (ZOFRAN ODT) 4 MG TABLET DISPERSIBLE Take 1 Tablet by mouth every 6 hours as needed for Nausea (give PO if no IV route available). 10 Tablet 0   • timolol (TIMOPTIC) 0.5 % Solution Administer 1 Drop into both eyes every day.     • Empagliflozin (JARDIANCE) 25 MG Tab TAKE 1 TABLET DAILY 90 Tablet 1   • tamsulosin (FLOMAX) 0.4 MG capsule Take 1 Capsule  by mouth 1/2 hour after breakfast. 90 Capsule 1   • Cholecalciferol (VITAMIN D3) 2000 UNIT Cap Take 1 Capsule by mouth every day. 90 Capsule 3   • albuterol 108 (90 Base) MCG/ACT Aero Soln inhalation aerosol USE 2 INHALATIONS EVERY 6 HOURS AS NEEDED FOR SHORTNESS OF BREATH (Patient taking differently: 2 Puffs every 6 hours as needed for Shortness of Breath. USE 2 INHALATIONS EVERY 6 HOURS AS NEEDED FOR SHORTNESS OF BREATH) 3 Each 1     No current facility-administered medications on file prior to visit.     Verified no anticonvulsant or azole therapy, education provided for future use.      Examination  Significant gait impairment / imbalance / high risk for falls - no obvious risks    Final Assessment and Recommendations:  Patient appears stable from the anticoagulation standpoint  Benefits of continued DOAC therapy outweigh risks for this patient  Recommend continue current DOAC at same dose  Stop DOAC after completing a full 3 months of therapy.    - finish off your current supply of Eliquis 5 mg by mouth twice daily (has ~ 2 weeks of medication).  - go to the ER for shortness of breath, chest pain, pain with deep inhalation, or new leg swelling and/or pain in calf or leg   - let all of your providers know you have a history of a blood clot in your lungs, especially if having any surgeries or hospitalizations  - avoid sedentary periods  - continue complete avoidance of tobacco products  - avoid hormonal therapies including estrogen or testosterone-containing meds, or raloxifene or tamoxifene (commonly used for osteoporosis)  - while taking Eliquis avoid Aspirin and anti-inflammatories (eg. Advil, ibuprofen, Aleve, naproxen, etc)   - elevate legs as much as possible, use compression stockings/socks if directed by your provider  - if any bleeding lasting 30min without stopping, please seek care with your PCP, urgent care, or ED  - if having any invasive procedure, please make sure the doctor knows of your history  of blood clots and current anticoagulation status  - keep up with age-appropriate cancer screenings as a small % of blood clots may be caused by an underlying malignancy  - reversal agents for most blood thinners are now available and used if you have major bleeding      Follow up:  Will follow up with patient in vasc PRN. Continue to f/u with PCP and DM clinic.      Tamia CHEN    Cc:  Dr Alvin Vallecillo

## 2022-01-12 ENCOUNTER — HOSPITAL ENCOUNTER (OUTPATIENT)
Dept: LAB | Facility: MEDICAL CENTER | Age: 67
End: 2022-01-12
Attending: INTERNAL MEDICINE
Payer: MEDICARE

## 2022-01-12 DIAGNOSIS — E11.29 TYPE 2 DIABETES MELLITUS WITH ALBUMINURIA (HCC): ICD-10-CM

## 2022-01-12 DIAGNOSIS — E78.2 MIXED HYPERLIPIDEMIA: ICD-10-CM

## 2022-01-12 DIAGNOSIS — Z13.29 SCREENING FOR THYROID DISORDER: ICD-10-CM

## 2022-01-12 DIAGNOSIS — I26.99 OTHER ACUTE PULMONARY EMBOLISM WITHOUT ACUTE COR PULMONALE (HCC): ICD-10-CM

## 2022-01-12 DIAGNOSIS — R80.9 TYPE 2 DIABETES MELLITUS WITH ALBUMINURIA (HCC): ICD-10-CM

## 2022-01-12 LAB
ALBUMIN SERPL BCP-MCNC: 4.4 G/DL (ref 3.2–4.9)
ALBUMIN/GLOB SERPL: 1.6 G/DL
ALP SERPL-CCNC: 46 U/L (ref 30–99)
ALT SERPL-CCNC: 12 U/L (ref 2–50)
ANION GAP SERPL CALC-SCNC: 12 MMOL/L (ref 7–16)
AST SERPL-CCNC: 13 U/L (ref 12–45)
BASOPHILS # BLD AUTO: 1.4 % (ref 0–1.8)
BASOPHILS # BLD: 0.05 K/UL (ref 0–0.12)
BILIRUB SERPL-MCNC: 0.4 MG/DL (ref 0.1–1.5)
BUN SERPL-MCNC: 15 MG/DL (ref 8–22)
CALCIUM SERPL-MCNC: 9.7 MG/DL (ref 8.5–10.5)
CHLORIDE SERPL-SCNC: 103 MMOL/L (ref 96–112)
CHOLEST SERPL-MCNC: 221 MG/DL (ref 100–199)
CO2 SERPL-SCNC: 24 MMOL/L (ref 20–33)
CREAT SERPL-MCNC: 0.83 MG/DL (ref 0.5–1.4)
EOSINOPHIL # BLD AUTO: 0.41 K/UL (ref 0–0.51)
EOSINOPHIL NFR BLD: 11.1 % (ref 0–6.9)
ERYTHROCYTE [DISTWIDTH] IN BLOOD BY AUTOMATED COUNT: 45.5 FL (ref 35.9–50)
EST. AVERAGE GLUCOSE BLD GHB EST-MCNC: 174 MG/DL
GLOBULIN SER CALC-MCNC: 2.7 G/DL (ref 1.9–3.5)
GLUCOSE SERPL-MCNC: 162 MG/DL (ref 65–99)
HBA1C MFR BLD: 7.7 % (ref 4–5.6)
HCT VFR BLD AUTO: 40.4 % (ref 42–52)
HDLC SERPL-MCNC: 39 MG/DL
HGB BLD-MCNC: 13.8 G/DL (ref 14–18)
IMM GRANULOCYTES # BLD AUTO: 0.01 K/UL (ref 0–0.11)
IMM GRANULOCYTES NFR BLD AUTO: 0.3 % (ref 0–0.9)
LDLC SERPL CALC-MCNC: 153 MG/DL
LYMPHOCYTES # BLD AUTO: 1.23 K/UL (ref 1–4.8)
LYMPHOCYTES NFR BLD: 33.3 % (ref 22–41)
MCH RBC QN AUTO: 32.8 PG (ref 27–33)
MCHC RBC AUTO-ENTMCNC: 34.2 G/DL (ref 33.7–35.3)
MCV RBC AUTO: 96 FL (ref 81.4–97.8)
MONOCYTES # BLD AUTO: 0.37 K/UL (ref 0–0.85)
MONOCYTES NFR BLD AUTO: 10 % (ref 0–13.4)
NEUTROPHILS # BLD AUTO: 1.62 K/UL (ref 1.82–7.42)
NEUTROPHILS NFR BLD: 43.9 % (ref 44–72)
NRBC # BLD AUTO: 0 K/UL
NRBC BLD-RTO: 0 /100 WBC
PLATELET # BLD AUTO: 227 K/UL (ref 164–446)
PMV BLD AUTO: 9.8 FL (ref 9–12.9)
POTASSIUM SERPL-SCNC: 4.3 MMOL/L (ref 3.6–5.5)
PROT SERPL-MCNC: 7.1 G/DL (ref 6–8.2)
RBC # BLD AUTO: 4.21 M/UL (ref 4.7–6.1)
SODIUM SERPL-SCNC: 139 MMOL/L (ref 135–145)
T3FREE SERPL-MCNC: 3.06 PG/ML (ref 2–4.4)
T4 FREE SERPL-MCNC: 1.05 NG/DL (ref 0.93–1.7)
THYROPEROXIDASE AB SERPL-ACNC: 9 IU/ML (ref 0–9)
TRIGL SERPL-MCNC: 145 MG/DL (ref 0–149)
TSH SERPL DL<=0.005 MIU/L-ACNC: 1.83 UIU/ML (ref 0.38–5.33)
WBC # BLD AUTO: 3.7 K/UL (ref 4.8–10.8)

## 2022-01-12 PROCEDURE — 80053 COMPREHEN METABOLIC PANEL: CPT

## 2022-01-12 PROCEDURE — 36415 COLL VENOUS BLD VENIPUNCTURE: CPT

## 2022-01-12 PROCEDURE — 86376 MICROSOMAL ANTIBODY EACH: CPT

## 2022-01-12 PROCEDURE — 80061 LIPID PANEL: CPT

## 2022-01-12 PROCEDURE — 83036 HEMOGLOBIN GLYCOSYLATED A1C: CPT

## 2022-01-12 PROCEDURE — 84443 ASSAY THYROID STIM HORMONE: CPT

## 2022-01-12 PROCEDURE — 85025 COMPLETE CBC W/AUTO DIFF WBC: CPT

## 2022-01-12 PROCEDURE — 84481 FREE ASSAY (FT-3): CPT

## 2022-01-12 PROCEDURE — 84439 ASSAY OF FREE THYROXINE: CPT

## 2022-01-20 ENCOUNTER — OFFICE VISIT (OUTPATIENT)
Dept: MEDICAL GROUP | Facility: PHYSICIAN GROUP | Age: 67
End: 2022-01-20
Payer: MEDICARE

## 2022-01-20 VITALS
HEIGHT: 65 IN | WEIGHT: 146 LBS | BODY MASS INDEX: 24.32 KG/M2 | SYSTOLIC BLOOD PRESSURE: 102 MMHG | OXYGEN SATURATION: 97 % | DIASTOLIC BLOOD PRESSURE: 54 MMHG | TEMPERATURE: 97.9 F | HEART RATE: 71 BPM

## 2022-01-20 DIAGNOSIS — E11.29 TYPE 2 DIABETES MELLITUS WITH ALBUMINURIA (HCC): ICD-10-CM

## 2022-01-20 DIAGNOSIS — R80.9 TYPE 2 DIABETES MELLITUS WITH ALBUMINURIA (HCC): ICD-10-CM

## 2022-01-20 DIAGNOSIS — Z12.11 SCREEN FOR COLON CANCER: ICD-10-CM

## 2022-01-20 DIAGNOSIS — E78.2 MIXED HYPERLIPIDEMIA: ICD-10-CM

## 2022-01-20 DIAGNOSIS — E11.319 TYPE 2 DIABETES MELLITUS WITH RETINOPATHY OF BOTH EYES, WITHOUT LONG-TERM CURRENT USE OF INSULIN, MACULAR EDEMA PRESENCE UNSPECIFIED, UNSPECIFIED RETINOPATHY SEVERITY (HCC): ICD-10-CM

## 2022-01-20 DIAGNOSIS — Z12.5 ENCOUNTER FOR SCREENING FOR MALIGNANT NEOPLASM OF PROSTATE: ICD-10-CM

## 2022-01-20 PROCEDURE — 99214 OFFICE O/P EST MOD 30 MIN: CPT | Performed by: INTERNAL MEDICINE

## 2022-01-20 RX ORDER — ATORVASTATIN CALCIUM 20 MG/1
20 TABLET, FILM COATED ORAL NIGHTLY
Qty: 60 TABLET | Refills: 6 | Status: SHIPPED | OUTPATIENT
Start: 2022-01-20 | End: 2022-02-17

## 2022-01-20 ASSESSMENT — FIBROSIS 4 INDEX: FIB4 SCORE: 1.12

## 2022-01-20 NOTE — PROGRESS NOTES
Established Patient    Chief Complaint   Patient presents with   • Lab Results       Subjective:     HPI:   Jerome presents today with the following.    Patient Active Problem List    Diagnosis Date Noted   • Dyslipidemia associated with type 2 diabetes mellitus ([facility]) 01/03/2022   • Type 2 diabetes mellitus, without long-term current use of insulin ([facility]) 01/03/2022   • Mild nonproliferative diabetic retinopathy of both eyes without macular edema associated with type 2 diabetes mellitus ([facility]) 01/03/2022   • History of pulmonary embolus (PE) 01/03/2022   • Disorder of arteries and arterioles ([facility]) 01/03/2022   • Pulmonary embolism associated with COVID-19 ([facility]) 01/03/2022   • Body mass index (BMI) of 24.0-24.9 in adult 01/03/2022   • Benign prostatic hyperplasia without lower urinary tract symptoms 08/18/2021   • Vitamin D deficiency 12/28/2020   • Muscle cramps 11/02/2020   • Hypertension associated with diabetes ([facility]) 03/09/2020   • 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[facility]) 10/20/2016   • Retinopathy of right eye 08/29/2016   • Microalbuminuria 09/28/2015   • Hyperlipidemia 06/19/2014   • Asthma 06/19/2014   • Wears hearing aid 06/19/2014       Current Outpatient Medications on File Prior to Visit   Medication Sig Dispense Refill   • omeprazole (PRILOSEC) 20 MG delayed-release capsule Take 1 Capsule by mouth every day. 30 Capsule 3   • lisinopril (PRINIVIL) 10 MG Tab Take 1 Tablet by mouth every day. 90 Tablet 3   • metFORMIN ER (GLUCOPHAGE XR) 750 MG TABLET SR 24 HR Take 1 Tablet by mouth 2 times a day. 180 Tablet 3   • ondansetron (ZOFRAN ODT) 4 MG TABLET DISPERSIBLE Take 1 Tablet by mouth every 6 hours as needed for Nausea (give PO if no IV route available). 10 Tablet 0   • timolol (TIMOPTIC) 0.5 % Solution Administer 1 Drop into both eyes every day.     • Empagliflozin (JARDIANCE) 25 MG Tab TAKE 1 TABLET DAILY 90 Tablet 1   • tamsulosin (FLOMAX) 0.4 MG capsule Take 1 Capsule by mouth 1/2 hour after  "breakfast. 90 Capsule 1   • Cholecalciferol (VITAMIN D3) 2000 UNIT Cap Take 1 Capsule by mouth every day. 90 Capsule 3   • albuterol 108 (90 Base) MCG/ACT Aero Soln inhalation aerosol USE 2 INHALATIONS EVERY 6 HOURS AS NEEDED FOR SHORTNESS OF BREATH (Patient taking differently: 2 Puffs every 6 hours as needed for Shortness of Breath. USE 2 INHALATIONS EVERY 6 HOURS AS NEEDED FOR SHORTNESS OF BREATH) 3 Each 1     No current facility-administered medications on file prior to visit.       Allergies, past medical history, past surgical history, family history, social history reviewed and updated    ROS:  All other systems reviewed and are negative except as stated in the HPI       Physical Exam:     /54 (BP Location: Right arm, Patient Position: Sitting, BP Cuff Size: Adult)   Pulse 71   Temp 36.6 °C (97.9 °F) (Temporal)   Ht 1.651 m (5' 5\")   Wt 66.2 kg (146 lb)   SpO2 97%   BMI 24.30 kg/m²   General: Normal appearing. No distress.  Pulmonary: Clear to ausculation.  Normal effort.   Cardiovascular: Regular rate and rhythm    Assessment and Plan:     66 y.o. male with the following issues.    2. Mixed hyperlipidemia  Increase Lipitor from 10 mg to 20 mg, before he had some statin issues with muscle cramps, will monitor that  - atorvastatin (LIPITOR) 20 MG Tab; Take 1 Tablet by mouth every evening.  Dispense: 60 Tablet; Refill: 6    3. Type 2 diabetes mellitus with retinopathy of both eyes, without long-term current use of insulin, macular edema presence unspecified, unspecified retinopathy severity (HCC)  Increase Trulicity from 1.5 mg to 3 mg weekly, A1c increased from 7.2 through 7.7, he check blood sugar before breakfast and ranges higher than before and yesterday was 190, patient is not compliant currently with healthy diet, try to improve that again and try to also starting some exercise  - Dulaglutide 3 MG/0.5ML Solution Pen-injector; Inject 1 Each under the skin every 7 days.  Dispense: 2 mL; Refill: " 3  -Continue metformin 750 mg daily, Jardiance 25 mg daily,    -Discussed diabetic diet in detail, educated regarding management of hypoglycemia, advised regular exercise, educated disease management and weight goals as well as feet care and frequent check of feet.  -Patient to check early morning fasting blood glucose with goal discussed.  - asked to have a BG log with daily fasting and 2 hr postprandial after biggest meal and bring to next visit or send through my chart  -Discussed side effects of medication. Patient confirmed understanding of information.    4. Screen for colon cancer  - OCCULT BLOOD FECES IMMUNOASSAY; Future    5. Encounter for screening for malignant neoplasm of prostate  - PROSTATE SPECIFIC AG    Follow Up:      Return in about 3 months (around 4/20/2022).  Diabetes, A1c,  Please note that this dictation was created using voice recognition software. I have made every reasonable attempt to correct obvious errors, but I expect that there are errors of grammar and possibly content that I did not discover before finalizing the note.    Signed by: Alvin Esquivel M.D.

## 2022-02-16 ENCOUNTER — TELEPHONE (OUTPATIENT)
Dept: MEDICAL GROUP | Facility: PHYSICIAN GROUP | Age: 67
End: 2022-02-16
Payer: MEDICARE

## 2022-02-16 NOTE — TELEPHONE ENCOUNTER
"VOICEMAIL  1. Caller Name: Jerome                      Call Back Number: [phone] (home)       2. Message: Patient called and left the following voicemail:    \"I am experiencing cramping with the statin that I am currently taking and I would like to try Crestor.\"    Please advise.    3. Patient approves office to leave a detailed voicemail/MyChart message: yes and N\A  "

## 2022-02-17 DIAGNOSIS — E78.2 MIXED HYPERLIPIDEMIA: ICD-10-CM

## 2022-02-17 RX ORDER — PRAVASTATIN SODIUM 20 MG
20 TABLET ORAL NIGHTLY
Qty: 30 TABLET | Refills: 11 | Status: SHIPPED | OUTPATIENT
Start: 2022-02-17 | End: 2022-06-01 | Stop reason: SDUPTHER

## 2022-02-24 ENCOUNTER — NON-PROVIDER VISIT (OUTPATIENT)
Dept: MEDICAL GROUP | Facility: PHYSICIAN GROUP | Age: 67
End: 2022-02-24
Payer: MEDICARE

## 2022-02-24 DIAGNOSIS — E11.29 TYPE 2 DIABETES MELLITUS WITH ALBUMINURIA (HCC): ICD-10-CM

## 2022-02-24 DIAGNOSIS — R80.9 TYPE 2 DIABETES MELLITUS WITH ALBUMINURIA (HCC): ICD-10-CM

## 2022-02-24 DIAGNOSIS — E11.319 TYPE 2 DIABETES MELLITUS WITH RETINOPATHY OF BOTH EYES, WITHOUT LONG-TERM CURRENT USE OF INSULIN, MACULAR EDEMA PRESENCE UNSPECIFIED, UNSPECIFIED RETINOPATHY SEVERITY (HCC): ICD-10-CM

## 2022-02-24 PROCEDURE — 99401 PREV MED CNSL INDIV APPRX 15: CPT | Performed by: INTERNAL MEDICINE

## 2022-02-24 NOTE — PROGRESS NOTES
Patient Consult Note      Time 900am - 920    Primary care physician: Alvin Esquivel M.D.    Reason for consult: Management of Uncontrolled Type 2 Diabetes    HPI:  Jerome Mendoza is a 65 y.o. old patient who comes in today for evaluation of above stated problem.    Most Recent HbA1c:   Lab Results   Component Value Date/Time    HBA1C 7.7 (H) 01/12/2022 06:34 AM     Lab Results   Component Value Date/Time    CREATININE 0.83 01/12/2022 06:34 AM       Current Outpatient Medications:   •  pravastatin, 20 mg, Oral, Nightly  •  Dulaglutide, 1 Each, Subcutaneous, Q7 DAYS  •  Ubiquinol, 200 mg, Oral, DAILY  •  omeprazole, 20 mg, Oral, DAILY  •  lisinopril, 10 mg, Oral, DAILY  •  metFORMIN ER, 750 mg, Oral, BID  •  ondansetron, 4 mg, Oral, Q6HRS PRN  •  timolol, 1 Drop, Both Eyes, DAILY  •  Jardiance, TAKE 1 TABLET DAILY  •  tamsulosin, 0.4 mg, Oral, AFTER BREAKFAST  •  Vitamin D3, 1 Each, Oral, DAILY  •  albuterol, USE 2 INHALATIONS EVERY 6 HOURS AS NEEDED FOR SHORTNESS OF BREATH (Patient taking differently: 2 Puff, EVERY 6 HOURS PRN, Shortness of Breath, USE 2 INHALATIONS EVERY 6 HOURS AS NEEDED FOR SHORTNESS OF BREATH)    Diabetes Medication History and Current Regimen  · Metformin: Metformin  twice daily  · GLP-1 Agent: Dulaglutide 3 mg once weekly  · SGLT-2 Inhibitor:  Empagliflozin 25 mg once daily     Pt has home glucometer and proper testing technique - yes, but is not testing   Pt reports blood sugars:     Hypoglycemia awareness - yes  Nocturnal hypoglycemia- no  Hypoglycemia:  None    Pt's treatment of Hypoglycemia    - 15:15 Rule discussed with patient    Current Exercise:  -he tries to exercise as frequently as possible.  He goes on walks around his neighborhood.  But had COVID in October and is still fatigued   Exercise Goal -30 minutes at least 5 times a week.    Dietary -feels hungry and does not feel full.     Foot Exam:  Visual Inspection: Feet without maceration, ulcers, fissures.  Feet  dry.  Pedal pulses: intact bilaterally  Complains of rashes on the side of his legs.  He has been using a steroid cream.  Small patches almost look like plaques of eczema.  Outside the scope of my practice, defer to PCP    Preventative Management  BP regimen (ACE/ARB) -lisinopril 10 mg daily  ASA -no  Statin -pravastatin 20mg (not able to tolerate a higher dose)  Cardiovascular   Patient Type, check all that apply:   Primary Prevention    Lab Results   Component Value Date/Time    CHOLSTRLTOT 221 (H) 01/12/2022 06:34 AM     (H) 01/12/2022 06:34 AM    HDL 39 (A) 01/12/2022 06:34 AM    TRIGLYCERIDE 145 01/12/2022 06:34 AM       Lab Results   Component Value Date/Time    SODIUM 139 01/12/2022 06:34 AM    POTASSIUM 4.3 01/12/2022 06:34 AM    CHLORIDE 103 01/12/2022 06:34 AM    CO2 24 01/12/2022 06:34 AM    GLUCOSE 162 (H) 01/12/2022 06:34 AM    BUN 15 01/12/2022 06:34 AM    CREATININE 0.83 01/12/2022 06:34 AM     Lab Results   Component Value Date/Time    ALKPHOSPHAT 46 01/12/2022 06:34 AM    ASTSGOT 13 01/12/2022 06:34 AM    ALTSGPT 12 01/12/2022 06:34 AM    TBILIRUBIN 0.4 01/12/2022 06:34 AM        Last Retinal Scan -sees a ophthalmologist.  He is up-to-date, but gets shots in his eyes.   Last Foot Exam -regularly examines feet.  No problems to report.    updated caregaps    Past Medical History:  Patient Active Problem List    Diagnosis Date Noted   • Dyslipidemia associated with type 2 diabetes mellitus ([facility]) 01/03/2022   • Type 2 diabetes mellitus, without long-term current use of insulin ([facility]) 01/03/2022   • Mild nonproliferative diabetic retinopathy of both eyes without macular edema associated with type 2 diabetes mellitus ([facility]) 01/03/2022   • History of pulmonary embolus (PE) 01/03/2022   • Disorder of arteries and arterioles ([facility]) 01/03/2022   • Pulmonary embolism associated with COVID-19 ([facility]) 01/03/2022   • Body mass index (BMI) of 24.0-24.9 in adult 01/03/2022   • Benign prostatic hyperplasia without  lower urinary tract symptoms 08/18/2021   • Vitamin D deficiency 12/28/2020   • Muscle cramps 11/02/2020   • Hypertension associated with diabetes (HCC) 03/09/2020   • 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[facility]) 10/20/2016   • Retinopathy of right eye 08/29/2016   • Microalbuminuria 09/28/2015   • Hyperlipidemia 06/19/2014   • Asthma 06/19/2014   • Wears hearing aid 06/19/2014       Past Surgical History:  Past Surgical History:   Procedure Laterality Date   • TONSILLECTOMY AND ADENOIDECTOMY     • VASECTOMY         Allergies:  Penicillin g    Social History:  Social History     Socioeconomic History   • Marital status:      Spouse name: Not on file   • Number of children: Not on file   • Years of education: Not on file   • Highest education level: Not on file   Occupational History   • Not on file   Tobacco Use   • Smoking status: Never Smoker   • Smokeless tobacco: Never Used   • Tobacco comment: avoid all tobacco products   Vaping Use   • Vaping Use: Never used   Substance and Sexual Activity   • Alcohol use: No     Alcohol/week: 0.0 oz   • Drug use: No   • Sexual activity: Yes     Partners: Female   Other Topics Concern   • Not on file   Social History Narrative   • Not on file     Social Determinants of Health     Financial Resource Strain: Not on file   Food Insecurity: Not on file   Transportation Needs: Not on file   Physical Activity: Not on file   Stress: Not on file   Social Connections: Not on file   Intimate Partner Violence: Not on file   Housing Stability: Not on file       Family History:  Family History   Problem Relation Age of Onset   • Asthma Mother    • Diabetes Father    • Psychiatric Illness Father         dementia   • Heart Disease Neg Hx    • Stroke Neg Hx    • Cancer Neg Hx        Medications:    Current Outpatient Medications:   •  pravastatin (PRAVACHOL) 20 MG Tab, Take 1 Tablet by mouth every evening., Disp: 30 Tablet, Rfl: 11  •  Dulaglutide 3 MG/0.5ML Solution Pen-injector,  Inject 1 Each under the skin every 7 days., Disp: 2 mL, Rfl: 3  •  Ubiquinol 100 MG Cap, Take 200 mg by mouth every day., Disp: 120 Capsule, Rfl: 3  •  omeprazole (PRILOSEC) 20 MG delayed-release capsule, Take 1 Capsule by mouth every day., Disp: 30 Capsule, Rfl: 3  •  lisinopril (PRINIVIL) 10 MG Tab, Take 1 Tablet by mouth every day., Disp: 90 Tablet, Rfl: 3  •  metFORMIN ER (GLUCOPHAGE XR) 750 MG TABLET SR 24 HR, Take 1 Tablet by mouth 2 times a day., Disp: 180 Tablet, Rfl: 3  •  ondansetron (ZOFRAN ODT) 4 MG TABLET DISPERSIBLE, Take 1 Tablet by mouth every 6 hours as needed for Nausea (give PO if no IV route available)., Disp: 10 Tablet, Rfl: 0  •  timolol (TIMOPTIC) 0.5 % Solution, Administer 1 Drop into both eyes every day., Disp: , Rfl:   •  Empagliflozin (JARDIANCE) 25 MG Tab, TAKE 1 TABLET DAILY, Disp: 90 Tablet, Rfl: 1  •  tamsulosin (FLOMAX) 0.4 MG capsule, Take 1 Capsule by mouth 1/2 hour after breakfast., Disp: 90 Capsule, Rfl: 1  •  Cholecalciferol (VITAMIN D3) 2000 UNIT Cap, Take 1 Capsule by mouth every day., Disp: 90 Capsule, Rfl: 3  •  albuterol 108 (90 Base) MCG/ACT Aero Soln inhalation aerosol, USE 2 INHALATIONS EVERY 6 HOURS AS NEEDED FOR SHORTNESS OF BREATH (Patient taking differently: 2 Puffs every 6 hours as needed for Shortness of Breath. USE 2 INHALATIONS EVERY 6 HOURS AS NEEDED FOR SHORTNESS OF BREATH), Disp: 3 Each, Rfl: 1    Labs: Reviewed    Physical Examination:  Vital signs: There were no vitals taken for this visit. There is no height or weight on file to calculate BMI.    Assessment and Plan  -Due to recent Covid infection he has been very hungry, not able to exercise, and fatigued.  -He would like to keep his medications the same and slowly start eating better and exercising.  -His GI symptoms, that he reported at previous appointments, appear to be resolved and he is tolerating all of his medication, As listed below  -He is able to afford his medications at the current time  -  Medication   · Metformin: Metformin  twice daily  · GLP-1 Agent: Dulaglutide 3 mg once weekly  · SGLT-2 Inhibitor:  Empagliflozin 25 mg once daily     -Blood glucose and A1c target    • However, more aggressive diabetic control is reasonable when tolerated.    2.  Cardiovascular  pcp to manage.     - Lifestyle changes     · Focus on eating a Mediterranean-style diet, as seen above  · Exercise 30 minutes daily at least 5 days/week, as tolerated.      Follow-up appointment in 16 weeks     Jese Key, PharmD, MS, BCACP, [facility]  Phone [phone] fax [phone]    This note was created using voice recognition software (Dragon). The accuracy of the dictation is limited by the abilities of the software. I have reviewed the note prior to signing, however some errors in grammar and context are still possible. If you have any questions related to this note please do not hesitate to contact our office.     12/10/20    CC:   Alvin Esquivel M.D.  No ref. provider found

## 2022-03-09 DIAGNOSIS — R39.9 LOWER URINARY TRACT SYMPTOMS (LUTS): ICD-10-CM

## 2022-03-09 RX ORDER — TAMSULOSIN HYDROCHLORIDE 0.4 MG/1
CAPSULE ORAL
Qty: 90 CAPSULE | Refills: 0 | Status: SHIPPED | OUTPATIENT
Start: 2022-03-09 | End: 2022-03-21

## 2022-03-20 DIAGNOSIS — R39.9 LOWER URINARY TRACT SYMPTOMS (LUTS): ICD-10-CM

## 2022-03-21 RX ORDER — TAMSULOSIN HYDROCHLORIDE 0.4 MG/1
CAPSULE ORAL
Qty: 90 CAPSULE | Refills: 0 | Status: SHIPPED | OUTPATIENT
Start: 2022-03-21 | End: 2022-06-01 | Stop reason: SDUPTHER

## 2022-04-07 DIAGNOSIS — R80.9 TYPE 2 DIABETES MELLITUS WITH ALBUMINURIA (HCC): ICD-10-CM

## 2022-04-07 DIAGNOSIS — E11.29 TYPE 2 DIABETES MELLITUS WITH ALBUMINURIA (HCC): ICD-10-CM

## 2022-04-07 RX ORDER — EMPAGLIFLOZIN 25 MG/1
TABLET, FILM COATED ORAL
Qty: 30 TABLET | Refills: 0 | Status: SHIPPED | OUTPATIENT
Start: 2022-04-07 | End: 2022-05-13

## 2022-04-12 ENCOUNTER — TELEPHONE (OUTPATIENT)
Dept: MEDICAL GROUP | Facility: PHYSICIAN GROUP | Age: 67
End: 2022-04-12
Payer: MEDICARE

## 2022-04-12 NOTE — TELEPHONE ENCOUNTER
ESTABLISHED PATIENT PRE-VISIT PLANNING     Patient was contacted to complete PVP.     Note: Patient will not be contacted if there is no indication to call.     1.  Reviewed notes from the last few office visits within the medical group: Yes    2.  If any orders were placed at last visit or intended to be done for this visit (i.e. 6 mos follow-up), do we have Results/Consult Notes?         •  Labs - Labs ordered, NOT completed. Patient advised to complete prior to next appointment.  Note: If patient appointment is for lab review and patient did not complete labs, check with provider if OK to reschedule patient until labs completed.       •  Imaging - Imaging was not ordered at last office visit.       •  Referrals - No referrals were ordered at last office visit.    3. Is this appointment scheduled as a Hospital Follow-Up? No    4.  Immunizations were updated in Epic using Reconcile Outside Information activity? Yes    5.  Patient is due for the following Health Maintenance Topics:   Health Maintenance Due   Topic Date Due   • COVID-19 Vaccine (1) Never done   • IMM ZOSTER VACCINES (1 of 2) Never done   • IMM DTaP/Tdap/Td Vaccine (1 - Tdap) 01/21/2004   • IMM PNEUMOCOCCAL VACCINE: 65+ Years (3 - PPSV23 or PCV20) 10/20/2021   • DIABETES MONOFILAMENT / LE EXAM  11/12/2021   • RETINAL SCREENING  03/08/2022       6.  AHA (Pulse8) form printed for Provider? Yes

## 2022-04-19 ENCOUNTER — HOSPITAL ENCOUNTER (OUTPATIENT)
Dept: LAB | Facility: MEDICAL CENTER | Age: 67
End: 2022-04-19
Attending: INTERNAL MEDICINE
Payer: MEDICARE

## 2022-04-19 DIAGNOSIS — R80.9 TYPE 2 DIABETES MELLITUS WITH ALBUMINURIA (HCC): ICD-10-CM

## 2022-04-19 DIAGNOSIS — E11.319 TYPE 2 DIABETES MELLITUS WITH RETINOPATHY OF BOTH EYES, WITHOUT LONG-TERM CURRENT USE OF INSULIN, MACULAR EDEMA PRESENCE UNSPECIFIED, UNSPECIFIED RETINOPATHY SEVERITY (HCC): ICD-10-CM

## 2022-04-19 DIAGNOSIS — E11.29 TYPE 2 DIABETES MELLITUS WITH ALBUMINURIA (HCC): ICD-10-CM

## 2022-04-19 LAB
ALBUMIN SERPL BCP-MCNC: 4.2 G/DL (ref 3.2–4.9)
ALBUMIN/GLOB SERPL: 1.7 G/DL
ALP SERPL-CCNC: 45 U/L (ref 30–99)
ALT SERPL-CCNC: 11 U/L (ref 2–50)
ANION GAP SERPL CALC-SCNC: 10 MMOL/L (ref 7–16)
AST SERPL-CCNC: 12 U/L (ref 12–45)
BILIRUB SERPL-MCNC: 0.6 MG/DL (ref 0.1–1.5)
BUN SERPL-MCNC: 18 MG/DL (ref 8–22)
CALCIUM SERPL-MCNC: 9.5 MG/DL (ref 8.5–10.5)
CHLORIDE SERPL-SCNC: 104 MMOL/L (ref 96–112)
CHOLEST SERPL-MCNC: 163 MG/DL (ref 100–199)
CO2 SERPL-SCNC: 24 MMOL/L (ref 20–33)
CREAT SERPL-MCNC: 0.9 MG/DL (ref 0.5–1.4)
EST. AVERAGE GLUCOSE BLD GHB EST-MCNC: 174 MG/DL
FASTING STATUS PATIENT QL REPORTED: NORMAL
GFR SERPLBLD CREATININE-BSD FMLA CKD-EPI: 93 ML/MIN/1.73 M 2
GLOBULIN SER CALC-MCNC: 2.5 G/DL (ref 1.9–3.5)
GLUCOSE SERPL-MCNC: 193 MG/DL (ref 65–99)
HBA1C MFR BLD: 7.7 % (ref 4–5.6)
HDLC SERPL-MCNC: 37 MG/DL
LDLC SERPL CALC-MCNC: 102 MG/DL
POTASSIUM SERPL-SCNC: 4.7 MMOL/L (ref 3.6–5.5)
PROT SERPL-MCNC: 6.7 G/DL (ref 6–8.2)
PSA SERPL-MCNC: 0.71 NG/ML (ref 0–4)
SODIUM SERPL-SCNC: 138 MMOL/L (ref 135–145)
TRIGL SERPL-MCNC: 120 MG/DL (ref 0–149)

## 2022-04-19 PROCEDURE — 36415 COLL VENOUS BLD VENIPUNCTURE: CPT

## 2022-04-19 PROCEDURE — 83036 HEMOGLOBIN GLYCOSYLATED A1C: CPT

## 2022-04-19 PROCEDURE — 80053 COMPREHEN METABOLIC PANEL: CPT

## 2022-04-19 PROCEDURE — 80061 LIPID PANEL: CPT

## 2022-04-19 PROCEDURE — 84153 ASSAY OF PSA TOTAL: CPT

## 2022-04-27 ENCOUNTER — OFFICE VISIT (OUTPATIENT)
Dept: MEDICAL GROUP | Facility: PHYSICIAN GROUP | Age: 67
End: 2022-04-27
Payer: MEDICARE

## 2022-04-27 VITALS
RESPIRATION RATE: 14 BRPM | HEIGHT: 65 IN | HEART RATE: 75 BPM | WEIGHT: 150 LBS | DIASTOLIC BLOOD PRESSURE: 74 MMHG | SYSTOLIC BLOOD PRESSURE: 124 MMHG | BODY MASS INDEX: 24.99 KG/M2 | TEMPERATURE: 97.9 F | OXYGEN SATURATION: 97 %

## 2022-04-27 DIAGNOSIS — E11.29 TYPE 2 DIABETES MELLITUS WITH ALBUMINURIA (HCC): ICD-10-CM

## 2022-04-27 DIAGNOSIS — R80.9 TYPE 2 DIABETES MELLITUS WITH ALBUMINURIA (HCC): ICD-10-CM

## 2022-04-27 DIAGNOSIS — E11.59 HYPERTENSION ASSOCIATED WITH DIABETES (HCC): ICD-10-CM

## 2022-04-27 DIAGNOSIS — I15.2 HYPERTENSION ASSOCIATED WITH DIABETES (HCC): ICD-10-CM

## 2022-04-27 DIAGNOSIS — E78.2 MIXED HYPERLIPIDEMIA: ICD-10-CM

## 2022-04-27 PROCEDURE — 99214 OFFICE O/P EST MOD 30 MIN: CPT | Performed by: INTERNAL MEDICINE

## 2022-04-27 RX ORDER — METFORMIN HYDROCHLORIDE 500 MG/1
1000 TABLET, EXTENDED RELEASE ORAL 2 TIMES DAILY
Qty: 360 TABLET | Refills: 3 | Status: SHIPPED | OUTPATIENT
Start: 2022-04-27 | End: 2022-06-01 | Stop reason: SDUPTHER

## 2022-04-27 ASSESSMENT — FIBROSIS 4 INDEX: FIB4 SCORE: 1.08

## 2022-04-27 NOTE — PROGRESS NOTES
Established Patient    Chief Complaint   Patient presents with   • Follow-Up   • Lab Results       Subjective:     HPI:   Jerome presents today with the following.    Patient Active Problem List    Diagnosis Date Noted   • Dyslipidemia associated with type 2 diabetes mellitus ([facility]) 01/03/2022   • Type 2 diabetes mellitus, without long-term current use of insulin ([facility]) 01/03/2022   • Mild nonproliferative diabetic retinopathy of both eyes without macular edema associated with type 2 diabetes mellitus ([facility]) 01/03/2022   • History of pulmonary embolus (PE) 01/03/2022   • Disorder of arteries and arterioles ([facility]) 01/03/2022   • Pulmonary embolism associated with COVID-19 ([facility]) 01/03/2022   • Body mass index (BMI) of 24.0-24.9 in adult 01/03/2022   • Benign prostatic hyperplasia without lower urinary tract symptoms 08/18/2021   • Vitamin D deficiency 12/28/2020   • Muscle cramps 11/02/2020   • Hypertension associated with diabetes ([facility]) 03/09/2020   • 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[facility]) 10/20/2016   • Retinopathy of right eye 08/29/2016   • Microalbuminuria 09/28/2015   • Hyperlipidemia 06/19/2014   • Asthma 06/19/2014   • Wears hearing aid 06/19/2014       Current Outpatient Medications on File Prior to Visit   Medication Sig Dispense Refill   • Empagliflozin (JARDIANCE) 25 MG Tab Take 1 tablet by mouth once daily 30 Tablet 0   • tamsulosin (FLOMAX) 0.4 MG capsule TAKE 1 CAPSULE BY MOUTH ONCE DAILY 30  MINUTES  AFTER  BREAKFAST 90 Capsule 0   • pravastatin (PRAVACHOL) 20 MG Tab Take 1 Tablet by mouth every evening. 30 Tablet 11   • Dulaglutide 3 MG/0.5ML Solution Pen-injector Inject 1 Each under the skin every 7 days. 2 mL 3   • Ubiquinol 100 MG Cap Take 200 mg by mouth every day. 120 Capsule 3   • omeprazole (PRILOSEC) 20 MG delayed-release capsule Take 1 Capsule by mouth every day. 30 Capsule 3   • lisinopril (PRINIVIL) 10 MG Tab Take 1 Tablet by mouth every day. 90 Tablet 3   • ondansetron (ZOFRAN ODT) 4  "MG TABLET DISPERSIBLE Take 1 Tablet by mouth every 6 hours as needed for Nausea (give PO if no IV route available). 10 Tablet 0   • timolol (TIMOPTIC) 0.5 % Solution Administer 1 Drop into both eyes every day.     • Cholecalciferol (VITAMIN D3) 2000 UNIT Cap Take 1 Capsule by mouth every day. 90 Capsule 3   • albuterol 108 (90 Base) MCG/ACT Aero Soln inhalation aerosol USE 2 INHALATIONS EVERY 6 HOURS AS NEEDED FOR SHORTNESS OF BREATH (Patient taking differently: 2 Puffs every 6 hours as needed for Shortness of Breath. USE 2 INHALATIONS EVERY 6 HOURS AS NEEDED FOR SHORTNESS OF BREATH) 3 Each 1     No current facility-administered medications on file prior to visit.       Allergies, past medical history, past surgical history, family history, social history reviewed and updated    ROS:  All other systems reviewed and are negative except as stated in the HPI       Physical Exam:     /74 (BP Location: Left arm, Patient Position: Sitting, BP Cuff Size: Adult)   Pulse 75   Temp 36.6 °C (97.9 °F) (Temporal)   Resp 14   Ht 1.651 m (5' 5\")   Wt 68 kg (150 lb)   SpO2 97%   BMI 24.96 kg/m²   General: Normal appearing. No distress.  Pulmonary: Clear to ausculation.  Normal effort.   Cardiovascular: Regular rate and rhythm    Assessment and Plan:     66 y.o. male with the following issues.    1. Mixed hyperlipidemia  2. 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)  Patient had lab recently with improvement of cholesterol panel, he is compliant with pravastatin 20 mg daily, denies any side effect,  - His A1c same as before 7.7, reported compliance with metformin 750 mg daily, Trulicity 3 mg weekly, Jardiance 25 mg daily, denied having side effects so far  - Patient admits that he is not really compliant with healthy diabetic diet as before, he check fasting blood sugar range from 1 , denied having hypoglycemic episode as well  - I increase metformin to 1000 twice daily, reported he had some GI issues with " mild diarrhea with metformin maximum dose, however mention that it was secondary to too much coffee, would like to go back to metformin 1000 twice daily and try again  -Continue follow-up with ophthalmology as well  - metFORMIN ER (GLUCOPHAGE XR) 500 MG TABLET SR 24 HR; Take 2 Tablets by mouth 2 times a day.  Dispense: 360 Tablet; Refill: 3    -Discussed diabetic diet in detail, educated regarding management of hypoglycemia, advised regular exercise, educated disease management and weight goals as well as feet care and frequent check of feet.  -Patient to check early morning fasting blood glucose with goal discussed.  - asked to have a BG log with daily fasting and 2 hr postprandial after biggest meal and bring to next visit or send through my chart  -Discussed side effects of medication. Patient confirmed understanding of information.    3. Hypertension associated with diabetes (HCC)  Stable, well-controlled, compliant with lisinopril 10 mg daily, denied having related symptoms    Follow Up:      Return in about 3 months (around 7/27/2022).    Please note that this dictation was created using voice recognition software. I have made every reasonable attempt to correct obvious errors, but I expect that there are errors of grammar and possibly content that I did not discover before finalizing the note.    Signed by: Alvin Esquivel M.D.

## 2022-05-13 DIAGNOSIS — R80.9 TYPE 2 DIABETES MELLITUS WITH ALBUMINURIA (HCC): ICD-10-CM

## 2022-05-13 DIAGNOSIS — E11.29 TYPE 2 DIABETES MELLITUS WITH ALBUMINURIA (HCC): ICD-10-CM

## 2022-05-13 RX ORDER — EMPAGLIFLOZIN 25 MG/1
TABLET, FILM COATED ORAL
Qty: 30 TABLET | Refills: 0 | Status: SHIPPED | OUTPATIENT
Start: 2022-05-13 | End: 2022-06-01 | Stop reason: SDUPTHER

## 2022-05-14 DIAGNOSIS — U07.1 COVID-19: ICD-10-CM

## 2022-05-16 RX ORDER — OMEPRAZOLE 20 MG/1
CAPSULE, DELAYED RELEASE ORAL
Qty: 30 CAPSULE | Refills: 0 | Status: SHIPPED | OUTPATIENT
Start: 2022-05-16 | End: 2022-06-01 | Stop reason: SDUPTHER

## 2022-05-30 DIAGNOSIS — E11.29 TYPE 2 DIABETES MELLITUS WITH ALBUMINURIA (HCC): ICD-10-CM

## 2022-05-30 DIAGNOSIS — E11.319 TYPE 2 DIABETES MELLITUS WITH RETINOPATHY OF BOTH EYES, WITHOUT LONG-TERM CURRENT USE OF INSULIN, MACULAR EDEMA PRESENCE UNSPECIFIED, UNSPECIFIED RETINOPATHY SEVERITY (HCC): ICD-10-CM

## 2022-05-30 DIAGNOSIS — R80.9 TYPE 2 DIABETES MELLITUS WITH ALBUMINURIA (HCC): ICD-10-CM

## 2022-05-31 RX ORDER — DULAGLUTIDE 3 MG/.5ML
INJECTION, SOLUTION SUBCUTANEOUS
Qty: 4 ML | Refills: 0 | Status: SHIPPED | OUTPATIENT
Start: 2022-05-31 | End: 2022-06-28

## 2022-06-01 DIAGNOSIS — R39.9 LOWER URINARY TRACT SYMPTOMS (LUTS): ICD-10-CM

## 2022-06-01 DIAGNOSIS — R80.9 TYPE 2 DIABETES MELLITUS WITH ALBUMINURIA (HCC): ICD-10-CM

## 2022-06-01 DIAGNOSIS — E78.2 MIXED HYPERLIPIDEMIA: ICD-10-CM

## 2022-06-01 DIAGNOSIS — U07.1 COVID-19: ICD-10-CM

## 2022-06-01 DIAGNOSIS — J45.40 MODERATE PERSISTENT ASTHMA WITHOUT COMPLICATION: ICD-10-CM

## 2022-06-01 DIAGNOSIS — E11.29 TYPE 2 DIABETES MELLITUS WITH ALBUMINURIA (HCC): ICD-10-CM

## 2022-06-01 RX ORDER — EMPAGLIFLOZIN 25 MG/1
1 TABLET, FILM COATED ORAL DAILY
Qty: 30 TABLET | Refills: 0 | Status: SHIPPED | OUTPATIENT
Start: 2022-06-01 | End: 2022-06-28 | Stop reason: SDUPTHER

## 2022-06-01 RX ORDER — TAMSULOSIN HYDROCHLORIDE 0.4 MG/1
CAPSULE ORAL
Qty: 90 CAPSULE | Refills: 0 | Status: SHIPPED | OUTPATIENT
Start: 2022-06-01 | End: 2022-09-13 | Stop reason: SDUPTHER

## 2022-06-01 RX ORDER — METFORMIN HYDROCHLORIDE 500 MG/1
1000 TABLET, EXTENDED RELEASE ORAL 2 TIMES DAILY
Qty: 360 TABLET | Refills: 3 | Status: SHIPPED | OUTPATIENT
Start: 2022-06-01 | End: 2022-10-26 | Stop reason: SDUPTHER

## 2022-06-01 RX ORDER — OMEPRAZOLE 20 MG/1
20 CAPSULE, DELAYED RELEASE ORAL DAILY
Qty: 30 CAPSULE | Refills: 0 | Status: SHIPPED | OUTPATIENT
Start: 2022-06-01 | End: 2022-06-24

## 2022-06-01 RX ORDER — PRAVASTATIN SODIUM 20 MG
20 TABLET ORAL NIGHTLY
Qty: 30 TABLET | Refills: 11 | Status: SHIPPED | OUTPATIENT
Start: 2022-06-01 | End: 2022-10-26 | Stop reason: SDUPTHER

## 2022-06-13 DIAGNOSIS — U07.1 COVID-19: ICD-10-CM

## 2022-06-20 ENCOUNTER — DOCUMENTATION (OUTPATIENT)
Dept: VASCULAR LAB | Facility: MEDICAL CENTER | Age: 67
End: 2022-06-20
Payer: MEDICARE

## 2022-06-23 ENCOUNTER — NON-PROVIDER VISIT (OUTPATIENT)
Dept: MEDICAL GROUP | Facility: PHYSICIAN GROUP | Age: 67
End: 2022-06-23
Payer: MEDICARE

## 2022-06-23 DIAGNOSIS — E78.2 MIXED HYPERLIPIDEMIA: ICD-10-CM

## 2022-06-23 DIAGNOSIS — E11.319 TYPE 2 DIABETES MELLITUS WITH RETINOPATHY OF BOTH EYES, WITHOUT LONG-TERM CURRENT USE OF INSULIN, MACULAR EDEMA PRESENCE UNSPECIFIED, UNSPECIFIED RETINOPATHY SEVERITY (HCC): ICD-10-CM

## 2022-06-23 PROCEDURE — 99211 OFF/OP EST MAY X REQ PHY/QHP: CPT | Performed by: STUDENT IN AN ORGANIZED HEALTH CARE EDUCATION/TRAINING PROGRAM

## 2022-06-23 NOTE — PROGRESS NOTES
Patient Consult Note      Time 1105-12   Time 25min    Primary care physician: Alvin Esquivel M.D.    Reason for consult: Management of Uncontrolled Type 2 Diabetes    HPI:  Jerome Mendoza is a 65 y.o. old patient who comes in today for evaluation of above stated problem.    Most Recent HbA1c:   Lab Results   Component Value Date/Time    HBA1C 7.7 (H) 04/19/2022 10:37 AM     Lab Results   Component Value Date/Time    CREATININE 0.90 04/19/2022 10:37 AM       Current Outpatient Medications:   •  omeprazole, 20 mg, Oral, DAILY  •  Jardiance, 1 Tablet, Oral, DAILY  •  metFORMIN ER, 1,000 mg, Oral, BID  •  pravastatin, 20 mg, Oral, Nightly  •  tamsulosin, TAKE 1 CAPSULE BY MOUTH ONCE DAILY 30  MINUTES  AFTER  BREAKFAST  •  Trulicity, INJECT 1 EACH UNDER THE SKIN ONCE A WEEK  •  Ubiquinol, 200 mg, Oral, DAILY  •  lisinopril, 10 mg, Oral, DAILY  •  ondansetron, 4 mg, Oral, Q6HRS PRN  •  timolol, 1 Drop, Both Eyes, DAILY  •  Vitamin D3, 1 Each, Oral, DAILY  •  albuterol, USE 2 INHALATIONS EVERY 6 HOURS AS NEEDED FOR SHORTNESS OF BREATH (Patient taking differently: 2 Puff, EVERY 6 HOURS PRN, Shortness of Breath, USE 2 INHALATIONS EVERY 6 HOURS AS NEEDED FOR SHORTNESS OF BREATH)    Diabetes Medication History and Current Regimen  · Metformin: Metformin ER 1000 twice daily  · GLP-1 Agent: Dulaglutide 3 mg once weekly  · SGLT-2 Inhibitor:  Empagliflozin 25 mg once daily     Pt has home glucometer and proper testing technique - yes, but is not testing   Pt reports blood sugars:     Hypoglycemia awareness - yes  Nocturnal hypoglycemia- no  Hypoglycemia:  None    Pt's treatment of Hypoglycemia    - 15:15 Rule discussed with patient    Current Exercise:  -he tries to exercise as frequently as possible.  He goes on walks around his neighborhood.  But had COVID in October and is still fatigued   Exercise Goal -30 minutes at least 5 times a week.    Dietary -feels hungry and does not feel full.     Foot Exam:  Visual  Inspection: Feet without maceration, ulcers, fissures.  Feet dry.  Pedal pulses: intact bilaterally  Complains of rashes on the side of his legs.  He has been using a steroid cream.  Small patches almost look like plaques of eczema.  Outside the scope of my practice, defer to PCP    Preventative Management  BP regimen (ACE/ARB) -lisinopril 10 mg daily  ASA -no  Statin -pravastatin 20mg (not able to tolerate a higher dose)  Cardiovascular   Patient Type, check all that apply:   Primary Prevention    Lab Results   Component Value Date/Time    CHOLSTRLTOT 163 04/19/2022 10:37 AM     (H) 04/19/2022 10:37 AM    HDL 37 (A) 04/19/2022 10:37 AM    TRIGLYCERIDE 120 04/19/2022 10:37 AM       Lab Results   Component Value Date/Time    SODIUM 138 04/19/2022 10:37 AM    POTASSIUM 4.7 04/19/2022 10:37 AM    CHLORIDE 104 04/19/2022 10:37 AM    CO2 24 04/19/2022 10:37 AM    GLUCOSE 193 (H) 04/19/2022 10:37 AM    BUN 18 04/19/2022 10:37 AM    CREATININE 0.90 04/19/2022 10:37 AM     Lab Results   Component Value Date/Time    ALKPHOSPHAT 45 04/19/2022 10:37 AM    ASTSGOT 12 04/19/2022 10:37 AM    ALTSGPT 11 04/19/2022 10:37 AM    TBILIRUBIN 0.6 04/19/2022 10:37 AM        Last Retinal Scan -sees a ophthalmologist.  He is up-to-date, but gets shots in his eyes.   Last Foot Exam -regularly examines feet.  No problems to report.    updated caregaps    Past Medical History:  Patient Active Problem List    Diagnosis Date Noted   • Dyslipidemia associated with type 2 diabetes mellitus ([facility]) 01/03/2022   • Type 2 diabetes mellitus, without long-term current use of insulin ([facility]) 01/03/2022   • Mild nonproliferative diabetic retinopathy of both eyes without macular edema associated with type 2 diabetes mellitus ([facility]) 01/03/2022   • History of pulmonary embolus (PE) 01/03/2022   • Disorder of arteries and arterioles ([facility]) 01/03/2022   • Pulmonary embolism associated with COVID-19 ([facility]) 01/03/2022   • Body mass index (BMI) of 24.0-24.9 in  adult 01/03/2022   • Benign prostatic hyperplasia without lower urinary tract symptoms 08/18/2021   • Vitamin D deficiency 12/28/2020   • Muscle cramps 11/02/2020   • Hypertension associated with diabetes (HCC) 03/09/2020   • 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[facility]) 10/20/2016   • Retinopathy of right eye 08/29/2016   • Microalbuminuria 09/28/2015   • Hyperlipidemia 06/19/2014   • Asthma 06/19/2014   • Wears hearing aid 06/19/2014       Past Surgical History:  Past Surgical History:   Procedure Laterality Date   • TONSILLECTOMY AND ADENOIDECTOMY     • VASECTOMY         Allergies:  Penicillin g    Social History:  Social History     Socioeconomic History   • Marital status:      Spouse name: Not on file   • Number of children: Not on file   • Years of education: Not on file   • Highest education level: Not on file   Occupational History   • Not on file   Tobacco Use   • Smoking status: Never Smoker   • Smokeless tobacco: Never Used   • Tobacco comment: avoid all tobacco products   Vaping Use   • Vaping Use: Never used   Substance and Sexual Activity   • Alcohol use: No     Alcohol/week: 0.0 oz   • Drug use: No   • Sexual activity: Yes     Partners: Female   Other Topics Concern   • Not on file   Social History Narrative   • Not on file     Social Determinants of Health     Financial Resource Strain: Not on file   Food Insecurity: Not on file   Transportation Needs: Not on file   Physical Activity: Not on file   Stress: Not on file   Social Connections: Not on file   Intimate Partner Violence: Not on file   Housing Stability: Not on file       Family History:  Family History   Problem Relation Age of Onset   • Asthma Mother    • Diabetes Father    • Psychiatric Illness Father         dementia   • Heart Disease Neg Hx    • Stroke Neg Hx    • Cancer Neg Hx        Medications:    Current Outpatient Medications:   •  omeprazole (PRILOSEC) 20 MG delayed-release capsule, Take 1 Capsule by mouth every day.,  Disp: 30 Capsule, Rfl: 0  •  Empagliflozin (JARDIANCE) 25 MG Tab, Take 1 Tablet by mouth every day., Disp: 30 Tablet, Rfl: 0  •  metFORMIN ER (GLUCOPHAGE XR) 500 MG TABLET SR 24 HR, Take 2 Tablets by mouth 2 times a day., Disp: 360 Tablet, Rfl: 3  •  pravastatin (PRAVACHOL) 20 MG Tab, Take 1 Tablet by mouth every evening., Disp: 30 Tablet, Rfl: 11  •  tamsulosin (FLOMAX) 0.4 MG capsule, TAKE 1 CAPSULE BY MOUTH ONCE DAILY 30  MINUTES  AFTER  BREAKFAST, Disp: 90 Capsule, Rfl: 0  •  TRULICITY 3 MG/0.5ML Solution Pen-injector, INJECT 1 EACH UNDER THE SKIN ONCE A WEEK, Disp: 4 mL, Rfl: 0  •  Ubiquinol 100 MG Cap, Take 200 mg by mouth every day., Disp: 120 Capsule, Rfl: 3  •  lisinopril (PRINIVIL) 10 MG Tab, Take 1 Tablet by mouth every day., Disp: 90 Tablet, Rfl: 3  •  ondansetron (ZOFRAN ODT) 4 MG TABLET DISPERSIBLE, Take 1 Tablet by mouth every 6 hours as needed for Nausea (give PO if no IV route available)., Disp: 10 Tablet, Rfl: 0  •  timolol (TIMOPTIC) 0.5 % Solution, Administer 1 Drop into both eyes every day., Disp: , Rfl:   •  Cholecalciferol (VITAMIN D3) 2000 UNIT Cap, Take 1 Capsule by mouth every day., Disp: 90 Capsule, Rfl: 3  •  albuterol 108 (90 Base) MCG/ACT Aero Soln inhalation aerosol, USE 2 INHALATIONS EVERY 6 HOURS AS NEEDED FOR SHORTNESS OF BREATH (Patient taking differently: 2 Puffs every 6 hours as needed for Shortness of Breath. USE 2 INHALATIONS EVERY 6 HOURS AS NEEDED FOR SHORTNESS OF BREATH), Disp: 3 Each, Rfl: 1    Labs: Reviewed    Physical Examination:  Vital signs: There were no vitals taken for this visit. There is no height or weight on file to calculate BMI.    Assessment and Plan  -He is able to afford his medications at the current time, but it is a lot for him, he can not get PAP due to recent refi.   -he will get labs   -he is not testing BS at home   - Medication   · Metformin ER 1000 twice daily  · GLP-1 Agent: Dulaglutide 3 mg once weekly  · SGLT-2 Inhibitor:  Empagliflozin 25 mg  once daily     -Blood glucose and A1c target    • However, more aggressive diabetic control is reasonable when tolerated.    2.  Cardiovascular  pcp to manage.     - Lifestyle changes     · Focus on eating a Mediterranean-style diet, as seen above  · Exercise 30 minutes daily at least 5 days/week, as tolerated.      Follow-up appointment in 8 weeks     Jese Key, PharmD, MS, BCACP, [facility]  Phone [phone] fax [phone]    This note was created using voice recognition software (Dragon). The accuracy of the dictation is limited by the abilities of the software. I have reviewed the note prior to signing, however some errors in grammar and context are still possible. If you have any questions related to this note please do not hesitate to contact our office.     12/10/20    CC:   Alvin Esquivel M.D.  No ref. provider found

## 2022-06-24 RX ORDER — OMEPRAZOLE 20 MG/1
CAPSULE, DELAYED RELEASE ORAL
Qty: 30 CAPSULE | Refills: 0 | Status: SHIPPED | OUTPATIENT
Start: 2022-06-24 | End: 2022-09-13

## 2022-06-28 DIAGNOSIS — E11.29 TYPE 2 DIABETES MELLITUS WITH ALBUMINURIA (HCC): ICD-10-CM

## 2022-06-28 DIAGNOSIS — R80.9 TYPE 2 DIABETES MELLITUS WITH ALBUMINURIA (HCC): ICD-10-CM

## 2022-06-28 RX ORDER — EMPAGLIFLOZIN 25 MG/1
1 TABLET, FILM COATED ORAL DAILY
Qty: 30 TABLET | Refills: 1 | Status: SHIPPED | OUTPATIENT
Start: 2022-06-28 | End: 2022-07-27 | Stop reason: SDUPTHER

## 2022-07-17 ENCOUNTER — TELEPHONE (OUTPATIENT)
Dept: MEDICAL GROUP | Facility: PHYSICIAN GROUP | Age: 67
End: 2022-07-17
Payer: MEDICARE

## 2022-07-17 NOTE — TELEPHONE ENCOUNTER
ESTABLISHED PATIENT PRE-VISIT PLANNING     Patient was NOT contacted to complete PVP.     Note: Patient will not be contacted if there is no indication to call.     1.  Reviewed notes from the last few office visits within the medical group: Yes    2.  If any orders were placed at last visit or intended to be done for this visit (i.e. 6 mos follow-up), do we have Results/Consult Notes?         •  Labs - Labs were not ordered at last office visit.  Note: If patient appointment is for lab review and patient did not complete labs, check with provider if OK to reschedule patient until labs completed.       •  Imaging - Imaging was not ordered at last office visit.       •  Referrals - No referrals were ordered at last office visit.    3. Is this appointment scheduled as a Hospital Follow-Up? No    4.  Immunizations were updated in Epic using Reconcile Outside Information activity? Yes    5.  Patient is due for the following Health Maintenance Topics:   Health Maintenance Due   Topic Date Due   • COVID-19 Vaccine (1) Never done   • IMM ZOSTER VACCINES (1 of 2) Never done   • IMM DTaP/Tdap/Td Vaccine (1 - Tdap) 01/21/2004   • IMM PNEUMOCOCCAL VACCINE: 65+ Years (3 - PPSV23 or PCV20) 10/20/2021   • URINE ACR / MICROALBUMIN  08/02/2022       6.  AHA (Pulse8) form printed for Provider? Yes

## 2022-07-22 ENCOUNTER — HOSPITAL ENCOUNTER (OUTPATIENT)
Dept: LAB | Facility: MEDICAL CENTER | Age: 67
End: 2022-07-22
Attending: INTERNAL MEDICINE
Payer: MEDICARE

## 2022-07-22 ENCOUNTER — HOSPITAL ENCOUNTER (OUTPATIENT)
Dept: LAB | Facility: MEDICAL CENTER | Age: 67
End: 2022-07-22
Attending: NURSE PRACTITIONER
Payer: MEDICARE

## 2022-07-22 DIAGNOSIS — E11.319 TYPE 2 DIABETES MELLITUS WITH RETINOPATHY OF BOTH EYES, WITHOUT LONG-TERM CURRENT USE OF INSULIN, MACULAR EDEMA PRESENCE UNSPECIFIED, UNSPECIFIED RETINOPATHY SEVERITY (HCC): ICD-10-CM

## 2022-07-22 DIAGNOSIS — E78.2 MIXED HYPERLIPIDEMIA: ICD-10-CM

## 2022-07-22 LAB
ALBUMIN SERPL BCP-MCNC: 4.4 G/DL (ref 3.2–4.9)
ALBUMIN/GLOB SERPL: 1.8 G/DL
ALP SERPL-CCNC: 39 U/L (ref 30–99)
ALT SERPL-CCNC: 9 U/L (ref 2–50)
ANION GAP SERPL CALC-SCNC: 11 MMOL/L (ref 7–16)
AST SERPL-CCNC: 12 U/L (ref 12–45)
BILIRUB SERPL-MCNC: 0.5 MG/DL (ref 0.1–1.5)
BUN SERPL-MCNC: 9 MG/DL (ref 8–22)
CALCIUM SERPL-MCNC: 9.5 MG/DL (ref 8.5–10.5)
CHLORIDE SERPL-SCNC: 103 MMOL/L (ref 96–112)
CHOLEST SERPL-MCNC: 146 MG/DL (ref 100–199)
CO2 SERPL-SCNC: 24 MMOL/L (ref 20–33)
CREAT SERPL-MCNC: 0.85 MG/DL (ref 0.5–1.4)
CREAT UR-MCNC: 29.42 MG/DL
EST. AVERAGE GLUCOSE BLD GHB EST-MCNC: 174 MG/DL
FASTING STATUS PATIENT QL REPORTED: NORMAL
GFR SERPLBLD CREATININE-BSD FMLA CKD-EPI: 94 ML/MIN/1.73 M 2
GLOBULIN SER CALC-MCNC: 2.5 G/DL (ref 1.9–3.5)
GLUCOSE SERPL-MCNC: 137 MG/DL (ref 65–99)
HBA1C MFR BLD: 7.7 % (ref 4–5.6)
HDLC SERPL-MCNC: 41 MG/DL
LDLC SERPL CALC-MCNC: 80 MG/DL
MICROALBUMIN UR-MCNC: 10.1 MG/DL
MICROALBUMIN/CREAT UR: 343 MG/G (ref 0–30)
POTASSIUM SERPL-SCNC: 4 MMOL/L (ref 3.6–5.5)
PROT SERPL-MCNC: 6.9 G/DL (ref 6–8.2)
PSA SERPL-MCNC: 0.76 NG/ML (ref 0–4)
SODIUM SERPL-SCNC: 138 MMOL/L (ref 135–145)
TRIGL SERPL-MCNC: 123 MG/DL (ref 0–149)

## 2022-07-22 PROCEDURE — 82570 ASSAY OF URINE CREATININE: CPT

## 2022-07-22 PROCEDURE — 80053 COMPREHEN METABOLIC PANEL: CPT

## 2022-07-22 PROCEDURE — 80061 LIPID PANEL: CPT

## 2022-07-22 PROCEDURE — 84153 ASSAY OF PSA TOTAL: CPT

## 2022-07-22 PROCEDURE — 82043 UR ALBUMIN QUANTITATIVE: CPT

## 2022-07-22 PROCEDURE — 83036 HEMOGLOBIN GLYCOSYLATED A1C: CPT

## 2022-07-22 PROCEDURE — 36415 COLL VENOUS BLD VENIPUNCTURE: CPT

## 2022-07-27 ENCOUNTER — PATIENT OUTREACH (OUTPATIENT)
Dept: HEALTH INFORMATION MANAGEMENT | Facility: OTHER | Age: 67
End: 2022-07-27

## 2022-07-27 ENCOUNTER — OFFICE VISIT (OUTPATIENT)
Dept: MEDICAL GROUP | Facility: PHYSICIAN GROUP | Age: 67
End: 2022-07-27
Payer: MEDICARE

## 2022-07-27 VITALS
DIASTOLIC BLOOD PRESSURE: 64 MMHG | HEIGHT: 65 IN | BODY MASS INDEX: 24.99 KG/M2 | RESPIRATION RATE: 14 BRPM | WEIGHT: 150 LBS | SYSTOLIC BLOOD PRESSURE: 118 MMHG | TEMPERATURE: 98.5 F | HEART RATE: 74 BPM | OXYGEN SATURATION: 98 %

## 2022-07-27 DIAGNOSIS — I15.2 HYPERTENSION ASSOCIATED WITH DIABETES (HCC): ICD-10-CM

## 2022-07-27 DIAGNOSIS — E11.319 TYPE 2 DIABETES MELLITUS WITH RETINOPATHY OF BOTH EYES, WITHOUT LONG-TERM CURRENT USE OF INSULIN, MACULAR EDEMA PRESENCE UNSPECIFIED, UNSPECIFIED RETINOPATHY SEVERITY (HCC): ICD-10-CM

## 2022-07-27 DIAGNOSIS — E11.59 HYPERTENSION ASSOCIATED WITH DIABETES (HCC): ICD-10-CM

## 2022-07-27 DIAGNOSIS — E78.2 MIXED HYPERLIPIDEMIA: ICD-10-CM

## 2022-07-27 DIAGNOSIS — R80.9 TYPE 2 DIABETES MELLITUS WITH ALBUMINURIA (HCC): ICD-10-CM

## 2022-07-27 DIAGNOSIS — E11.29 TYPE 2 DIABETES MELLITUS WITH ALBUMINURIA (HCC): ICD-10-CM

## 2022-07-27 PROCEDURE — 99214 OFFICE O/P EST MOD 30 MIN: CPT | Performed by: INTERNAL MEDICINE

## 2022-07-27 RX ORDER — DULAGLUTIDE 3 MG/.5ML
INJECTION, SOLUTION SUBCUTANEOUS
Qty: 4 ML | Refills: 3 | Status: SHIPPED | OUTPATIENT
Start: 2022-07-27 | End: 2022-10-26 | Stop reason: SDUPTHER

## 2022-07-27 RX ORDER — LATANOPROST 50 UG/ML
SOLUTION/ DROPS OPHTHALMIC
COMMUNITY
Start: 2022-07-22 | End: 2022-09-12 | Stop reason: SDUPTHER

## 2022-07-27 RX ORDER — GLIMEPIRIDE 1 MG/1
1 TABLET ORAL EVERY MORNING
Qty: 60 TABLET | Refills: 3 | Status: SHIPPED | OUTPATIENT
Start: 2022-07-27 | End: 2022-10-26 | Stop reason: SDUPTHER

## 2022-07-27 RX ORDER — EMPAGLIFLOZIN 25 MG/1
1 TABLET, FILM COATED ORAL DAILY
Qty: 30 TABLET | Refills: 3 | Status: SHIPPED | OUTPATIENT
Start: 2022-07-27 | End: 2022-10-10 | Stop reason: SDUPTHER

## 2022-07-27 ASSESSMENT — FIBROSIS 4 INDEX: FIB4 SCORE: 1.2

## 2022-07-27 NOTE — PROGRESS NOTES
Called pt back and scheduled CCM intake for next Weds, 8/3/22 at 9am in person at [facility].

## 2022-07-27 NOTE — PROGRESS NOTES
Spoke with pt today after his appt w/Dr. Esquivel. Pt needs help paying for his Jardiance and Trulicity. Pt states the Jardiance is costing him $140 and the Trulicity $260. Introduced pt to CCM program- pt would like to enroll in CCM program. Pt asks that I call him after 1pm today to schedule an enrollment date/time. Pt qualifies for CCM program with a risk score of 6 and the diagnoses of Diabetes and Hypertension.

## 2022-07-27 NOTE — PROGRESS NOTES
Established Patient    Chief Complaint   Patient presents with   • Lab Results   • Medication Refill     Truilicity        Subjective:     HPI:   Jerome presents today with the following.    Patient Active Problem List    Diagnosis Date Noted   • Dyslipidemia associated with type 2 diabetes mellitus ([facility]) 01/03/2022   • Type 2 diabetes mellitus, without long-term current use of insulin ([facility]) 01/03/2022   • Mild nonproliferative diabetic retinopathy of both eyes without macular edema associated with type 2 diabetes mellitus ([facility]) 01/03/2022   • History of pulmonary embolus (PE) 01/03/2022   • Disorder of arteries and arterioles ([facility]) 01/03/2022   • Pulmonary embolism associated with COVID-19 ([facility]) 01/03/2022   • Body mass index (BMI) of 24.0-24.9 in adult 01/03/2022   • Benign prostatic hyperplasia without lower urinary tract symptoms 08/18/2021   • Vitamin D deficiency 12/28/2020   • Muscle cramps 11/02/2020   • Hypertension associated with diabetes ([facility]) 03/09/2020   • 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[facility]) 10/20/2016   • Retinopathy of right eye 08/29/2016   • Microalbuminuria 09/28/2015   • Hyperlipidemia 06/19/2014   • Asthma 06/19/2014   • Wears hearing aid 06/19/2014       Current Outpatient Medications on File Prior to Visit   Medication Sig Dispense Refill   • latanoprost (XALATAN) 0.005 % Solution INSTILL 1 DROP INTO EACH EYE AT BEDTIME     • omeprazole (PRILOSEC) 20 MG delayed-release capsule Take 1 capsule by mouth once daily 30 Capsule 0   • metFORMIN ER (GLUCOPHAGE XR) 500 MG TABLET SR 24 HR Take 2 Tablets by mouth 2 times a day. 360 Tablet 3   • pravastatin (PRAVACHOL) 20 MG Tab Take 1 Tablet by mouth every evening. 30 Tablet 11   • tamsulosin (FLOMAX) 0.4 MG capsule TAKE 1 CAPSULE BY MOUTH ONCE DAILY 30  MINUTES  AFTER  BREAKFAST 90 Capsule 0   • Ubiquinol 100 MG Cap Take 200 mg by mouth every day. 120 Capsule 3   • lisinopril (PRINIVIL) 10 MG Tab Take 1 Tablet by mouth every day. 90 Tablet 3  "  • ondansetron (ZOFRAN ODT) 4 MG TABLET DISPERSIBLE Take 1 Tablet by mouth every 6 hours as needed for Nausea (give PO if no IV route available). 10 Tablet 0   • timolol (TIMOPTIC) 0.5 % Solution Administer 1 Drop into both eyes every day.     • Cholecalciferol (VITAMIN D3) 2000 UNIT Cap Take 1 Capsule by mouth every day. 90 Capsule 3   • albuterol 108 (90 Base) MCG/ACT Aero Soln inhalation aerosol USE 2 INHALATIONS EVERY 6 HOURS AS NEEDED FOR SHORTNESS OF BREATH (Patient taking differently: 2 Puffs every 6 hours as needed for Shortness of Breath. USE 2 INHALATIONS EVERY 6 HOURS AS NEEDED FOR SHORTNESS OF BREATH) 3 Each 1     No current facility-administered medications on file prior to visit.       Allergies, past medical history, past surgical history, family history, social history reviewed and updated    ROS:  All other systems reviewed and are negative except as stated in the HPI       Physical Exam:     /64 (BP Location: Right arm, Patient Position: Sitting, BP Cuff Size: Adult)   Pulse 74   Temp 36.9 °C (98.5 °F) (Temporal)   Resp 14   Ht 1.651 m (5' 5\")   Wt 68 kg (150 lb)   SpO2 98%   BMI 24.96 kg/m²   General: Normal appearing. No distress.  Pulmonary: Clear to ausculation.  Normal effort.   Cardiovascular: Regular rate and rhythm    Assessment and Plan:     66 y.o. male with the following issues.    1. 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)  - Empagliflozin (JARDIANCE) 25 MG Tab; Take 1 Tablet by mouth every day.  Dispense: 30 Tablet; Refill: 3  - Dulaglutide (TRULICITY) 3 MG/0.5ML Solution Pen-injector; INJECT 1 SYRINGE SUBCUTANEOUSLY ONCE A WEEK  Dispense: 4 mL; Refill: 3    2. Type 2 diabetes mellitus with retinopathy of both eyes, without long-term current use of insulin, macular edema presence unspecified, unspecified retinopathy severity (HCC)  - Dulaglutide (TRULICITY) 3 MG/0.5ML Solution Pen-injector; INJECT 1 SYRINGE SUBCUTANEOUSLY ONCE A WEEK  Dispense: 4 mL; Refill: " 3    3. Hypertension associated with diabetes (HCC)    4. Mixed hyperlipidemia    > Patient's A1c 7.7, as before, does not check regularly fasting blood sugar at home, reported compliance with metformin 1000 twice daily, Trulicity 3 mg weekly, Jardiance 25 mg daily, reported both Jardiance and Trulicity are expensive and he pay almost $500 every months for that, our  also seen patient regarding assistance and coverage for medication cost  - I also add glimepiride 1 mg daily for now to see how much it intact, I will increase Trulicity to maximum dose due to the side effect of diarrhea before with 3 mg weekly    Blood pressure well controlled, continue blood pressure medication, as well as denies side effect from statin    -Discussed diabetic diet in detail, educated regarding management of hypoglycemia, advised regular exercise, educated disease management and weight goals as well as feet care and frequent check of feet.  -Patient to check early morning fasting blood glucose with goal discussed.  - asked to have a BG log with daily fasting and 2 hr postprandial after biggest meal and bring to next visit or send through my chart  -Discussed side effects of medication. Patient confirmed understanding of information.    Follow Up:      Return in about 3 months (around 10/27/2022) for diabetes, A1c.    Please note that this dictation was created using voice recognition software. I have made every reasonable attempt to correct obvious errors, but I expect that there are errors of grammar and possibly content that I did not discover before finalizing the note.    Signed by: Alvin Esquivel M.D.

## 2022-08-03 ENCOUNTER — PATIENT OUTREACH (OUTPATIENT)
Dept: HEALTH INFORMATION MANAGEMENT | Facility: OTHER | Age: 67
End: 2022-08-03
Payer: MEDICARE

## 2022-08-03 DIAGNOSIS — E11.319 TYPE 2 DIABETES MELLITUS WITH RETINOPATHY OF BOTH EYES, WITHOUT LONG-TERM CURRENT USE OF INSULIN, MACULAR EDEMA PRESENCE UNSPECIFIED, UNSPECIFIED RETINOPATHY SEVERITY (HCC): ICD-10-CM

## 2022-08-03 DIAGNOSIS — E11.59 HYPERTENSION ASSOCIATED WITH DIABETES (HCC): ICD-10-CM

## 2022-08-03 DIAGNOSIS — E78.2 MIXED HYPERLIPIDEMIA: ICD-10-CM

## 2022-08-03 DIAGNOSIS — E11.3293 MILD NONPROLIFERATIVE DIABETIC RETINOPATHY OF BOTH EYES WITHOUT MACULAR EDEMA ASSOCIATED WITH TYPE 2 DIABETES MELLITUS (HCC): ICD-10-CM

## 2022-08-03 DIAGNOSIS — I15.2 HYPERTENSION ASSOCIATED WITH DIABETES (HCC): ICD-10-CM

## 2022-08-03 PROCEDURE — 99490 CHRNC CARE MGMT STAFF 1ST 20: CPT | Performed by: INTERNAL MEDICINE

## 2022-08-03 SDOH — ECONOMIC STABILITY: INCOME INSECURITY: IN THE LAST 12 MONTHS, WAS THERE A TIME WHEN YOU WERE NOT ABLE TO PAY THE MORTGAGE OR RENT ON TIME?: NO

## 2022-08-03 SDOH — ECONOMIC STABILITY: FOOD INSECURITY: WITHIN THE PAST 12 MONTHS, THE FOOD YOU BOUGHT JUST DIDN'T LAST AND YOU DIDN'T HAVE MONEY TO GET MORE.: NEVER TRUE

## 2022-08-03 SDOH — HEALTH STABILITY: PHYSICAL HEALTH: ON AVERAGE, HOW MANY MINUTES DO YOU ENGAGE IN EXERCISE AT THIS LEVEL?: 20 MIN

## 2022-08-03 SDOH — ECONOMIC STABILITY: TRANSPORTATION INSECURITY
IN THE PAST 12 MONTHS, HAS THE LACK OF TRANSPORTATION KEPT YOU FROM MEDICAL APPOINTMENTS OR FROM GETTING MEDICATIONS?: NO

## 2022-08-03 SDOH — ECONOMIC STABILITY: HOUSING INSECURITY: IN THE LAST 12 MONTHS, HOW MANY PLACES HAVE YOU LIVED?: 1

## 2022-08-03 SDOH — ECONOMIC STABILITY: FOOD INSECURITY: WITHIN THE PAST 12 MONTHS, YOU WORRIED THAT YOUR FOOD WOULD RUN OUT BEFORE YOU GOT MONEY TO BUY MORE.: NEVER TRUE

## 2022-08-03 SDOH — ECONOMIC STABILITY: HOUSING INSECURITY
IN THE LAST 12 MONTHS, WAS THERE A TIME WHEN YOU DID NOT HAVE A STEADY PLACE TO SLEEP OR SLEPT IN A SHELTER (INCLUDING NOW)?: NO

## 2022-08-03 SDOH — ECONOMIC STABILITY: TRANSPORTATION INSECURITY
IN THE PAST 12 MONTHS, HAS LACK OF TRANSPORTATION KEPT YOU FROM MEETINGS, WORK, OR FROM GETTING THINGS NEEDED FOR DAILY LIVING?: NO

## 2022-08-03 SDOH — HEALTH STABILITY: PHYSICAL HEALTH: ON AVERAGE, HOW MANY DAYS PER WEEK DO YOU ENGAGE IN MODERATE TO STRENUOUS EXERCISE (LIKE A BRISK WALK)?: 3 DAYS

## 2022-08-03 ASSESSMENT — SOCIAL DETERMINANTS OF HEALTH (SDOH)
WITHIN THE LAST YEAR, HAVE TO BEEN RAPED OR FORCED TO HAVE ANY KIND OF SEXUAL ACTIVITY BY YOUR PARTNER OR EX-PARTNER?: NO
HOW HARD IS IT FOR YOU TO PAY FOR THE VERY BASICS LIKE FOOD, HOUSING, MEDICAL CARE, AND HEATING?: NOT VERY HARD
HOW OFTEN DO YOU ATTEND CHURCH OR RELIGIOUS SERVICES?: MORE THAN 4 TIMES PER YEAR
HOW OFTEN DO YOU GET TOGETHER WITH FRIENDS OR RELATIVES?: MORE THAN THREE TIMES A WEEK
WITHIN THE LAST YEAR, HAVE YOU BEEN HUMILIATED OR EMOTIONALLY ABUSED IN OTHER WAYS BY YOUR PARTNER OR EX-PARTNER?: NO
DO YOU BELONG TO ANY CLUBS OR ORGANIZATIONS SUCH AS CHURCH GROUPS UNIONS, FRATERNAL OR ATHLETIC GROUPS, OR SCHOOL GROUPS?: YES
WITHIN THE LAST YEAR, HAVE YOU BEEN AFRAID OF YOUR PARTNER OR EX-PARTNER?: NO
IN A TYPICAL WEEK, HOW MANY TIMES DO YOU TALK ON THE PHONE WITH FAMILY, FRIENDS, OR NEIGHBORS?: MORE THAN THREE TIMES A WEEK
WITHIN THE LAST YEAR, HAVE YOU BEEN KICKED, HIT, SLAPPED, OR OTHERWISE PHYSICALLY HURT BY YOUR PARTNER OR EX-PARTNER?: NO
HOW OFTEN DO YOU ATTENT MEETINGS OF THE CLUB OR ORGANIZATION YOU BELONG TO?: 1 TO 4 TIMES PER YEAR

## 2022-08-03 ASSESSMENT — LIFESTYLE VARIABLES
HOW MANY STANDARD DRINKS CONTAINING ALCOHOL DO YOU HAVE ON A TYPICAL DAY: PATIENT DOES NOT DRINK
HOW OFTEN DO YOU HAVE SIX OR MORE DRINKS ON ONE OCCASION: NEVER
AUDIT-C TOTAL SCORE: 0
SKIP TO QUESTIONS 9-10: 1
HOW OFTEN DO YOU HAVE A DRINK CONTAINING ALCOHOL: NEVER

## 2022-08-03 NOTE — PROGRESS NOTES
INITIAL CARE MANAGEMENT CARE PLAN/ASSESEMENT     Jerome Mendoza verbally agreed to enroll and participate in the Chronic Care Management (CCM) program. Jerome is a 65 yo male,  for 49 years to his wife, Joy. Jerome qualifies for the CCM program with a risk score of 6 and the diagnoses of Hypertension (HTN), Hyperlipidemia and Diabetes Mellitis (DM). Jerome has had no falls in the last year. Jerome used to walk regularly, but took a cane with him to ensure safety. The cane was lost and Jerome would like to replace the cane so he could start walking again. Jerome has some right hip discomfort and the cane helped relieve hip pain so he could walk. Jerome does have a rowing machine at home and he uses the machine approximately three (3) times a week for 15-20 minutes at a time. Jerome said he is in the process of increasing his time on the rowing machine. Jerome denies needing assistance with transportation- independently drives, reports he is very socially active with friends and family, along with attending [employer] services. Jerome does need assistance with paying for two of his medications, Jardiance and Trulicity. He reportedly spends a few hundred dollars on them monthly. Jerome denied food insecurity, although we discussed using a food pantry to offset the costs of his medications. Jerome's last HgbA1c was 7.7% on 7/22/2022. Last Lipid profile on 7/22 was within normal limits, although in the past his total cholesterol and LDL levels were elevated. Office Bp readings from his visits in 2022 have been within normal range. Jerome is currently scheduled for eye surgery in November, but is hoping for a cancellation so he can receive the surgery earlier. He is followed by [facility]. Jerome has experienced ulcers on his feet in the past, reports no open areas now. Jerome would benefit with some guidance on Diabetes foot care, diet management, help with obtaining medications, and help obtaining a  new cane.           Medication Self-Management Goals:     Reviewed medications listed below with patient.      Current Outpatient Medications:   •  latanoprost (XALATAN) 0.005 % Solution, INSTILL 1 DROP INTO EACH EYE AT BEDTIME, Disp: , Rfl:   •  glimepiride (AMARYL) 1 MG tablet, Take 1 Tablet by mouth every morning., Disp: 60 Tablet, Rfl: 3  •  Empagliflozin (JARDIANCE) 25 MG Tab, Take 1 Tablet by mouth every day., Disp: 30 Tablet, Rfl: 3  •  Dulaglutide (TRULICITY) 3 MG/0.5ML Solution Pen-injector, INJECT 1 SYRINGE SUBCUTANEOUSLY ONCE A WEEK, Disp: 4 mL, Rfl: 3  •  omeprazole (PRILOSEC) 20 MG delayed-release capsule, Take 1 capsule by mouth once daily, Disp: 30 Capsule, Rfl: 0  •  metFORMIN ER (GLUCOPHAGE XR) 500 MG TABLET SR 24 HR, Take 2 Tablets by mouth 2 times a day., Disp: 360 Tablet, Rfl: 3  •  pravastatin (PRAVACHOL) 20 MG Tab, Take 1 Tablet by mouth every evening., Disp: 30 Tablet, Rfl: 11  •  tamsulosin (FLOMAX) 0.4 MG capsule, TAKE 1 CAPSULE BY MOUTH ONCE DAILY 30  MINUTES  AFTER  BREAKFAST, Disp: 90 Capsule, Rfl: 0  •  Ubiquinol 100 MG Cap, Take 200 mg by mouth every day., Disp: 120 Capsule, Rfl: 3  •  lisinopril (PRINIVIL) 10 MG Tab, Take 1 Tablet by mouth every day., Disp: 90 Tablet, Rfl: 3  •  ondansetron (ZOFRAN ODT) 4 MG TABLET DISPERSIBLE, Take 1 Tablet by mouth every 6 hours as needed for Nausea (give PO if no IV route available)., Disp: 10 Tablet, Rfl: 0  •  timolol (TIMOPTIC) 0.5 % Solution, Administer 1 Drop into both eyes every day., Disp: , Rfl:   •  Cholecalciferol (VITAMIN D3) 2000 UNIT Cap, Take 1 Capsule by mouth every day., Disp: 90 Capsule, Rfl: 3  •  albuterol 108 (90 Base) MCG/ACT Aero Soln inhalation aerosol, USE 2 INHALATIONS EVERY 6 HOURS AS NEEDED FOR SHORTNESS OF BREATH (Patient taking differently: 2 Puffs every 6 hours as needed for Shortness of Breath. USE 2 INHALATIONS EVERY 6 HOURS AS NEEDED FOR SHORTNESS OF BREATH), Disp: 3 Each, Rfl: 1         Goal: Continue medication  regimen as prescribed.            Physical/Functional/Environmental Status:       One or more falls in the last year: No  Advised to use a cane or walker to get around safely: Yes  Feels unsteady when walking: Yes  Steadies self on furniture while walking at home: No  Worried about falling: No  Needs to push with hands when rising from a chair: No  Has trouble stepping up onto a curb / using stairs: No  Has lost some feeling in feet: No  Takes medicine that makes him/her feel lightheaded or more tired than usual: No  Takes medicine to sleep or improve mood: No  Often feels sad or depressed: No       Goal: Jerome to remain free of falls and staff to educate on fall risks.           Financial Status: Jerome report needing help paying for medications only, although staff will provide food assistance resources to offset medication costs.      Goal: Assist Jerome with medication assistance application and offer food pantry services.          Transportation Status: Jerome reports no transportation concerns.     Goal: N/A        Mental/Behavioral/Psychosocial Status:    Depression Screen (PHQ-2/PHQ-9) 2/10/2020 2/3/2021 1/3/2022   PHQ-2 Total Score - - -   PHQ-2 Total Score 0 0 0       Interpretation of PHQ-9 Total Score   Score Severity   1-4 No Depression   5-9 Mild Depression   10-14 Moderate Depression   15-19 Moderately Severe Depression   20-27 Severe Depression       Goal:  Jerome to report and staff to monitor for mental health concerns.             Chronic Care Management Care Plan         Goal:  Jerome to remain free of diabetic ulcers.   Barriers: Knowledge of foot care for diabetics.  Interventions: Staff to provide foot care education and resource information.     Start Date: 8/3/2022  End Date:          Goal:  Jerome to verbalize how certain food groups affect his blood glucose levels.   Barriers: Knowledge of diabetic diet.  Interventions: Staff to provide educational resources and monthly support of  diabetic diet.    Start Date: 8/3/2022  End Date:      Goal:  Jerome to develop and implement a regularly scheduled exercise routine.   Barriers: Lack of supportive ambulation device.   Interventions: Staff to assist with obtaining a cane so Jerome can reinstate a walking regimen.     Start Date: 8/3/2022  End Date:      Goal:  Jerome to reduce A1c to below 7%.   Barriers: Paying for Diabetes medication.  Interventions: Staff to assist Jerome with completion of medication assistance program application.     Start Date: 8/3/2022  End Da    Discussion: Discussed goals with Jerome.     Goals: Created      Next Scheduled patient outreach: 9/6/2022

## 2022-08-05 ENCOUNTER — PATIENT OUTREACH (OUTPATIENT)
Dept: HEALTH INFORMATION MANAGEMENT | Facility: OTHER | Age: 67
End: 2022-08-05
Payer: MEDICARE

## 2022-08-05 DIAGNOSIS — E11.319 TYPE 2 DIABETES MELLITUS WITH RETINOPATHY OF BOTH EYES, WITHOUT LONG-TERM CURRENT USE OF INSULIN, MACULAR EDEMA PRESENCE UNSPECIFIED, UNSPECIFIED RETINOPATHY SEVERITY (HCC): ICD-10-CM

## 2022-08-05 DIAGNOSIS — E11.59 HYPERTENSION ASSOCIATED WITH DIABETES (HCC): ICD-10-CM

## 2022-08-05 DIAGNOSIS — I15.2 HYPERTENSION ASSOCIATED WITH DIABETES (HCC): ICD-10-CM

## 2022-08-05 NOTE — PROGRESS NOTES
Jerome called and LVM asking about financial documentation for the [employer] application. Spoke with Tamia, Pharmacy CM. Tamia said Jerome does not need to provide financial proof of income for application. Called Jerome back and gave him that info. Jerome will drop off the completed application On Monday, 8/8/22.

## 2022-08-09 ENCOUNTER — PATIENT OUTREACH (OUTPATIENT)
Dept: HEALTH INFORMATION MANAGEMENT | Facility: OTHER | Age: 67
End: 2022-08-09
Payer: MEDICARE

## 2022-08-09 DIAGNOSIS — R80.9 TYPE 2 DIABETES MELLITUS WITH ALBUMINURIA (HCC): ICD-10-CM

## 2022-08-09 DIAGNOSIS — E11.29 TYPE 2 DIABETES MELLITUS WITH ALBUMINURIA (HCC): ICD-10-CM

## 2022-08-09 NOTE — PROGRESS NOTES
8/9/22  [facility] Community Health Worker Intake  CHW received incoming call from patient Jerome requesting assistance with transportation for an Opthalmology appointment 8/10/22. Jerome is currently enrolled with the Chronic Care Management program through the [facility]. Jerome reports no financial barriers to housing. Jerome shares his wife acts as his main form of transportation - she is out of town for the rest of the week due to loss of her step father. CHW educated Jerome on his [facility] transportation benefits through [employer]. Provided Jerome [facility] customer service contact information to call to set up [facility] for appointment tomorrow. Jerome reported food insecurities and feels he will benefit from local food pantries. Jerome shares he usually utilizes a cane to get around, but his wife accidentally lost his cane recently. Jerome shares he is unable to afford another cane at this time. CHW provided information for [employer] so that Jerome can get a replacement cane for free or low cost.     • Social determinates of health intake: Completed.    • Identified barriers to: Transportation, food access, medical equipment.   • Contact information provided to Jerome Mendoza.  • Has PCP appointment scheduled for 10/26/22 with Alvin Esquivel MD.   • Case Management General Assessment in Epic: Completed.   • Review of care plan goals: Yes.   • Internal Referral to : No.     Action Plan:  CHW confirmed Jerome's mailing address and mailed [facility] food pantry flyer, list of local food pantries, [employer] flyer and application, and [employer] transportation information. CHW will continue to collaborate with [facility] RICH Murry on how to best support Jerome with his health goals.

## 2022-09-06 ENCOUNTER — PATIENT OUTREACH (OUTPATIENT)
Dept: HEALTH INFORMATION MANAGEMENT | Facility: OTHER | Age: 67
End: 2022-09-06
Payer: MEDICARE

## 2022-09-06 DIAGNOSIS — E11.29 TYPE 2 DIABETES MELLITUS WITH ALBUMINURIA (HCC): ICD-10-CM

## 2022-09-06 DIAGNOSIS — R80.9 TYPE 2 DIABETES MELLITUS WITH ALBUMINURIA (HCC): ICD-10-CM

## 2022-09-06 DIAGNOSIS — E11.59 HYPERTENSION ASSOCIATED WITH DIABETES (HCC): ICD-10-CM

## 2022-09-06 DIAGNOSIS — E11.69 DYSLIPIDEMIA ASSOCIATED WITH TYPE 2 DIABETES MELLITUS (HCC): ICD-10-CM

## 2022-09-06 DIAGNOSIS — E78.5 DYSLIPIDEMIA ASSOCIATED WITH TYPE 2 DIABETES MELLITUS (HCC): ICD-10-CM

## 2022-09-06 DIAGNOSIS — E78.2 MIXED HYPERLIPIDEMIA: ICD-10-CM

## 2022-09-06 DIAGNOSIS — I15.2 HYPERTENSION ASSOCIATED WITH DIABETES (HCC): ICD-10-CM

## 2022-09-06 PROCEDURE — 99999 PR NO CHARGE: CPT | Performed by: INTERNAL MEDICINE

## 2022-09-07 NOTE — PROGRESS NOTES
"Called pt for monthly [facility] outreach call. Pt did get the free Trulicity, he is very thankful and appreciative. Pt has been doing well, taking all medications as prescribed. No falls. Scheduled for eye surgery on 9/14/2022, but approx. 5 days ago started getting a \"cold\". Reports yellow phlegm, congestion, cough. Denies fever. Took an at home Covid test on 9/4/2022 with negative results. Pt cannot go through with eye surgery if respiratory symptoms do not resolve. Scheduled appt w/Alvin tomorrow at [facility] at 1420 with a 1405 check-in time. Pt thankful for assistance with scheduling- advised pt to call me whenever he has a medical problem or need.     Next outreach 10/6/2022  "

## 2022-09-08 ENCOUNTER — APPOINTMENT (OUTPATIENT)
Dept: RADIOLOGY | Facility: IMAGING CENTER | Age: 67
End: 2022-09-08
Attending: INTERNAL MEDICINE
Payer: MEDICARE

## 2022-09-08 ENCOUNTER — PATIENT OUTREACH (OUTPATIENT)
Dept: HEALTH INFORMATION MANAGEMENT | Facility: OTHER | Age: 67
End: 2022-09-08

## 2022-09-08 ENCOUNTER — OFFICE VISIT (OUTPATIENT)
Dept: MEDICAL GROUP | Facility: PHYSICIAN GROUP | Age: 67
End: 2022-09-08
Payer: MEDICARE

## 2022-09-08 ENCOUNTER — HOSPITAL ENCOUNTER (OUTPATIENT)
Facility: MEDICAL CENTER | Age: 67
End: 2022-09-08
Attending: INTERNAL MEDICINE
Payer: MEDICARE

## 2022-09-08 ENCOUNTER — NON-PROVIDER VISIT (OUTPATIENT)
Dept: MEDICAL GROUP | Facility: PHYSICIAN GROUP | Age: 67
End: 2022-09-08
Payer: MEDICARE

## 2022-09-08 VITALS
RESPIRATION RATE: 14 BRPM | TEMPERATURE: 99 F | WEIGHT: 153 LBS | SYSTOLIC BLOOD PRESSURE: 118 MMHG | BODY MASS INDEX: 24.59 KG/M2 | HEART RATE: 80 BPM | OXYGEN SATURATION: 96 % | DIASTOLIC BLOOD PRESSURE: 68 MMHG | HEIGHT: 66 IN

## 2022-09-08 DIAGNOSIS — J45.41 MODERATE PERSISTENT ASTHMA WITH EXACERBATION: ICD-10-CM

## 2022-09-08 DIAGNOSIS — E11.29 TYPE 2 DIABETES MELLITUS WITH ALBUMINURIA (HCC): ICD-10-CM

## 2022-09-08 DIAGNOSIS — R05.9 COUGH: ICD-10-CM

## 2022-09-08 DIAGNOSIS — R09.89 CHEST CONGESTION: ICD-10-CM

## 2022-09-08 DIAGNOSIS — R01.1 HEART MURMUR: ICD-10-CM

## 2022-09-08 DIAGNOSIS — E11.59 HYPERTENSION ASSOCIATED WITH DIABETES (HCC): ICD-10-CM

## 2022-09-08 DIAGNOSIS — E11.319 TYPE 2 DIABETES MELLITUS WITH RETINOPATHY OF BOTH EYES, WITHOUT LONG-TERM CURRENT USE OF INSULIN, MACULAR EDEMA PRESENCE UNSPECIFIED, UNSPECIFIED RETINOPATHY SEVERITY (HCC): ICD-10-CM

## 2022-09-08 DIAGNOSIS — R06.02 SOB (SHORTNESS OF BREATH): ICD-10-CM

## 2022-09-08 DIAGNOSIS — R80.9 TYPE 2 DIABETES MELLITUS WITH ALBUMINURIA (HCC): ICD-10-CM

## 2022-09-08 DIAGNOSIS — I15.2 HYPERTENSION ASSOCIATED WITH DIABETES (HCC): ICD-10-CM

## 2022-09-08 LAB
EXTERNAL QUALITY CONTROL: NORMAL
FLUAV+FLUBV AG SPEC QL IA: NORMAL
INT CON NEG: NEGATIVE
INT CON POS: POSITIVE
SARS-COV+SARS-COV-2 AG RESP QL IA.RAPID: NEGATIVE

## 2022-09-08 PROCEDURE — 71046 X-RAY EXAM CHEST 2 VIEWS: CPT | Mod: TC | Performed by: RADIOLOGY

## 2022-09-08 PROCEDURE — 99214 OFFICE O/P EST MOD 30 MIN: CPT | Mod: CS | Performed by: INTERNAL MEDICINE

## 2022-09-08 PROCEDURE — 87804 INFLUENZA ASSAY W/OPTIC: CPT | Performed by: INTERNAL MEDICINE

## 2022-09-08 PROCEDURE — 87426 SARSCOV CORONAVIRUS AG IA: CPT | Performed by: INTERNAL MEDICINE

## 2022-09-08 RX ORDER — AZITHROMYCIN 250 MG/1
TABLET, FILM COATED ORAL
Qty: 6 TABLET | Refills: 0 | Status: SHIPPED | OUTPATIENT
Start: 2022-09-08 | End: 2023-11-22

## 2022-09-08 RX ORDER — PREDNISONE 20 MG/1
TABLET ORAL
Qty: 10 TABLET | Refills: 0 | Status: SHIPPED | OUTPATIENT
Start: 2022-09-08 | End: 2023-01-25

## 2022-09-08 RX ORDER — PREDNISONE 20 MG/1
TABLET ORAL
Qty: 30 TABLET | Refills: 0 | Status: SHIPPED | OUTPATIENT
Start: 2022-09-08 | End: 2022-09-08 | Stop reason: SDUPTHER

## 2022-09-08 RX ORDER — MONTELUKAST SODIUM 10 MG/1
10 TABLET ORAL DAILY
Qty: 30 TABLET | Refills: 3 | Status: SHIPPED | OUTPATIENT
Start: 2022-09-08 | End: 2023-09-28

## 2022-09-08 ASSESSMENT — FIBROSIS 4 INDEX: FIB4 SCORE: 1.2

## 2022-09-08 NOTE — PROGRESS NOTES
Established Patient    Chief Complaint   Patient presents with    Cough     Congestion     Medication Refill     Jardiance , latanpoprost        Subjective:     HPI:   Jerome presents today with the following.    Patient Active Problem List    Diagnosis Date Noted    Dyslipidemia associated with type 2 diabetes mellitus ([facility]) 01/03/2022    Type 2 diabetes mellitus, without long-term current use of insulin ([facility]) 01/03/2022    Mild nonproliferative diabetic retinopathy of both eyes without macular edema associated with type 2 diabetes mellitus ([facility]) 01/03/2022    History of pulmonary embolus (PE) 01/03/2022    Disorder of arteries and arterioles ([facility]) 01/03/2022    Pulmonary embolism associated with COVID-19 ([facility]) 01/03/2022    Body mass index (BMI) of 24.0-24.9 in adult 01/03/2022    Benign prostatic hyperplasia without lower urinary tract symptoms 08/18/2021    Vitamin D deficiency 12/28/2020    Muscle cramps 11/02/2020    Hypertension associated with diabetes ([facility]) 03/09/2020    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[facility]) 10/20/2016    Retinopathy of right eye 08/29/2016    Microalbuminuria 09/28/2015    Hyperlipidemia 06/19/2014    Asthma 06/19/2014    Wears hearing aid 06/19/2014       Current Outpatient Medications on File Prior to Visit   Medication Sig Dispense Refill    latanoprost (XALATAN) 0.005 % Solution INSTILL 1 DROP INTO EACH EYE AT BEDTIME      glimepiride (AMARYL) 1 MG tablet Take 1 Tablet by mouth every morning. 60 Tablet 3    Empagliflozin (JARDIANCE) 25 MG Tab Take 1 Tablet by mouth every day. 30 Tablet 3    Dulaglutide (TRULICITY) 3 MG/0.5ML Solution Pen-injector INJECT 1 SYRINGE SUBCUTANEOUSLY ONCE A WEEK 4 mL 3    omeprazole (PRILOSEC) 20 MG delayed-release capsule Take 1 capsule by mouth once daily 30 Capsule 0    metFORMIN ER (GLUCOPHAGE XR) 500 MG TABLET SR 24 HR Take 2 Tablets by mouth 2 times a day. 360 Tablet 3    pravastatin (PRAVACHOL) 20 MG Tab Take 1 Tablet by mouth every evening.  "30 Tablet 11    tamsulosin (FLOMAX) 0.4 MG capsule TAKE 1 CAPSULE BY MOUTH ONCE DAILY 30  MINUTES  AFTER  BREAKFAST 90 Capsule 0    Ubiquinol 100 MG Cap Take 200 mg by mouth every day. 120 Capsule 3    lisinopril (PRINIVIL) 10 MG Tab Take 1 Tablet by mouth every day. 90 Tablet 3    ondansetron (ZOFRAN ODT) 4 MG TABLET DISPERSIBLE Take 1 Tablet by mouth every 6 hours as needed for Nausea (give PO if no IV route available). 10 Tablet 0    timolol (TIMOPTIC) 0.5 % Solution Administer 1 Drop into both eyes every day.      Cholecalciferol (VITAMIN D3) 2000 UNIT Cap Take 1 Capsule by mouth every day. 90 Capsule 3    albuterol 108 (90 Base) MCG/ACT Aero Soln inhalation aerosol USE 2 INHALATIONS EVERY 6 HOURS AS NEEDED FOR SHORTNESS OF BREATH (Patient taking differently: 2 Puffs every 6 hours as needed for Shortness of Breath. USE 2 INHALATIONS EVERY 6 HOURS AS NEEDED FOR SHORTNESS OF BREATH) 3 Each 1     No current facility-administered medications on file prior to visit.       Allergies, past medical history, past surgical history, family history, social history reviewed and updated    ROS:  All other systems reviewed and are negative except as stated in the HPI       Physical Exam:     /68 (BP Location: Left arm, Patient Position: Sitting, BP Cuff Size: Adult)   Pulse 80   Temp 37.2 °C (99 °F) (Temporal)   Resp 14   Ht 1.676 m (5' 6\")   Wt 69.4 kg (153 lb)   SpO2 96%   BMI 24.69 kg/m²   General: Normal appearing. No distress.  Pulmonary: Clear to ausculation.  Normal effort.   Cardiovascular: Regular rate and rhythm    Assessment and Plan:     66 y.o. male with the following issues.    1. Cough  - azithromycin (ZITHROMAX) 250 MG Tab; Take 500 mg on first day, then 250 mg daily till finish  Dispense: 6 Tablet; Refill: 0  - montelukast (SINGULAIR) 10 MG Tab; Take 1 Tablet by mouth every day.  Dispense: 30 Tablet; Refill: 3  - DX-CHEST-2 VIEWS; Future  - POCT Influenza A/B  - POCT SARS-COV Antigen JACEY manual " result  - 2019 SARS-CoV-2 by PCR ([employer]); Future  - predniSONE (DELTASONE) 20 MG Tab; Take 40 mg daily for 5 days  Dispense: 30 Tablet; Refill: 0    2. Chest congestion  - azithromycin (ZITHROMAX) 250 MG Tab; Take 500 mg on first day, then 250 mg daily till finish  Dispense: 6 Tablet; Refill: 0  - montelukast (SINGULAIR) 10 MG Tab; Take 1 Tablet by mouth every day.  Dispense: 30 Tablet; Refill: 3  - DX-CHEST-2 VIEWS; Future  - POCT Influenza A/B  - POCT SARS-COV Antigen JACEY manual result  - 2019 SARS-CoV-2 by PCR ([employer]); Future  - predniSONE (DELTASONE) 20 MG Tab; Take 40 mg daily for 5 days  Dispense: 30 Tablet; Refill: 0    3. Moderate persistent asthma with exacerbation  - azithromycin (ZITHROMAX) 250 MG Tab; Take 500 mg on first day, then 250 mg daily till finish  Dispense: 6 Tablet; Refill: 0  - montelukast (SINGULAIR) 10 MG Tab; Take 1 Tablet by mouth every day.  Dispense: 30 Tablet; Refill: 3  - DX-CHEST-2 VIEWS; Future  - POCT Influenza A/B  - POCT SARS-COV Antigen JACEY manual result  - 2019 SARS-CoV-2 by PCR ([employer]); Future  - predniSONE (DELTASONE) 20 MG Tab; Take 40 mg daily for 5 days  Dispense: 30 Tablet; Refill: 0    4. SOB (shortness of breath)  - EC-ECHOCARDIOGRAM COMPLETE W/O CONT; Future  - predniSONE (DELTASONE) 20 MG Tab; Take 40 mg daily for 5 days  Dispense: 30 Tablet; Refill: 0    5. Heart murmur  - EC-ECHOCARDIOGRAM COMPLETE W/O CONT; Future    > This is going on for 1 week also, getting worse, with cough  and chest congestion, not well responded to cough syrup over-the-counter, he is using a lot of inhaler, asthma exacerbation, denies any fever or chills, need to rule out pneumonia as well  > ER visit precaution explained to the patient in detail.  Patient understands  -Educated regarding conservative management as well    Follow Up:   1 week    Please note that this dictation was created using voice recognition software. I have made every reasonable attempt to correct obvious  errors, but I expect that there are errors of grammar and possibly content that I did not discover before finalizing the note.    Signed by: Alvin Esquivel M.D.

## 2022-09-08 NOTE — PROGRESS NOTES
Jerome called reporting [facility] had a problem with the Rx for Prednisone that was just sent over by MD Alvin. Called [facility]- the quantity of pills was incorrect. Notified Dr. Esquivel and he sent corrected Rx. Notified Jerome of correction.

## 2022-09-08 NOTE — PROGRESS NOTES
Patient Consult Note      Time 930-945am   Time 15min    Primary care physician: Alvin Esquivel M.D.    Reason for consult: Management of Uncontrolled Type 2 Diabetes    HPI:  Jerome Mendoza is a 65 y.o. old patient who comes in today for evaluation of above stated problem.    Most Recent HbA1c:   Lab Results   Component Value Date/Time    HBA1C 7.7 (H) 07/22/2022 08:40 AM     Lab Results   Component Value Date/Time    CREATININE 0.85 07/22/2022 08:40 AM       Current Outpatient Medications:     azithromycin, Take 500 mg on first day, then 250 mg daily till finish    montelukast, 10 mg, Oral, DAILY    predniSONE, Take 40 mg daily for 5 days    latanoprost, INSTILL 1 DROP INTO EACH EYE AT BEDTIME    glimepiride, 1 mg, Oral, QAM    Jardiance, 1 Tablet, Oral, DAILY    Trulicity, INJECT 1 SYRINGE SUBCUTANEOUSLY ONCE A WEEK    omeprazole, Take 1 capsule by mouth once daily    metFORMIN ER, 1,000 mg, Oral, BID    pravastatin, 20 mg, Oral, Nightly    tamsulosin, TAKE 1 CAPSULE BY MOUTH ONCE DAILY 30  MINUTES  AFTER  BREAKFAST    Ubiquinol, 200 mg, Oral, DAILY    lisinopril, 10 mg, Oral, DAILY    ondansetron, 4 mg, Oral, Q6HRS PRN    timolol, 1 Drop, Both Eyes, DAILY    Vitamin D3, 1 Each, Oral, DAILY    albuterol, USE 2 INHALATIONS EVERY 6 HOURS AS NEEDED FOR SHORTNESS OF BREATH (Patient taking differently: 2 Puff, EVERY 6 HOURS PRN, Shortness of Breath, USE 2 INHALATIONS EVERY 6 HOURS AS NEEDED FOR SHORTNESS OF BREATH, Other)    Diabetes Medication History and Current Regimen  Metformin: Metformin ER 1000 twice daily  GLP-1 Agent: Dulaglutide 3 mg once weekly  SGLT-2 Inhibitor:  Empagliflozin 25 mg once daily   Glimepiride 1 mg in the a.m.    Pt has home glucometer and proper testing technique - yes, but is not testing   Pt reports blood sugars:     Hypoglycemia awareness - yes  Nocturnal hypoglycemia- no  Hypoglycemia: Yes since last appointment    Pt's treatment of Hypoglycemia    - 15:15 Rule discussed with  patient    Current Exercise:  -he tries to exercise as frequently as possible.  He goes on walks around his neighborhood.  But had COVID in October and is still fatigued   Exercise Goal -30 minutes at least 5 times a week.    Dietary -feels hungry and does not feel full.     Foot Exam:  Visual Inspection: Feet without maceration, ulcers, fissures.  Feet dry.  Pedal pulses: intact bilaterally  Complains of rashes on the side of his legs.  He has been using a steroid cream.  Small patches almost look like plaques of eczema.  Outside the scope of my practice, defer to PCP    Preventative Management  BP regimen (ACE/ARB) -lisinopril 10 mg daily  ASA -no  Statin -pravastatin 20mg (not able to tolerate a higher dose)  Cardiovascular   Patient Type, check all that apply:   Primary Prevention    Lab Results   Component Value Date/Time    CHOLSTRLTOT 146 07/22/2022 08:40 AM    LDL 80 07/22/2022 08:40 AM    HDL 41 07/22/2022 08:40 AM    TRIGLYCERIDE 123 07/22/2022 08:40 AM       Lab Results   Component Value Date/Time    SODIUM 138 07/22/2022 08:40 AM    POTASSIUM 4.0 07/22/2022 08:40 AM    CHLORIDE 103 07/22/2022 08:40 AM    CO2 24 07/22/2022 08:40 AM    GLUCOSE 137 (H) 07/22/2022 08:40 AM    BUN 9 07/22/2022 08:40 AM    CREATININE 0.85 07/22/2022 08:40 AM     Lab Results   Component Value Date/Time    ALKPHOSPHAT 39 07/22/2022 08:40 AM    ASTSGOT 12 07/22/2022 08:40 AM    ALTSGPT 9 07/22/2022 08:40 AM    TBILIRUBIN 0.5 07/22/2022 08:40 AM        Last Retinal Scan -sees a ophthalmologist.  He is up-to-date, but gets shots in his eyes.   Last Foot Exam -regularly examines feet.  No problems to report.    updated caregaps    Past Medical History:  Patient Active Problem List    Diagnosis Date Noted    Dyslipidemia associated with type 2 diabetes mellitus (HCC) 01/03/2022    Type 2 diabetes mellitus, without long-term current use of insulin ([facility]) 01/03/2022    Mild nonproliferative diabetic retinopathy of both eyes without  macular edema associated with type 2 diabetes mellitus ([facility]) 01/03/2022    History of pulmonary embolus (PE) 01/03/2022    Disorder of arteries and arterioles ([facility]CC) 01/03/2022    Pulmonary embolism associated with COVID-19 ([facility]) 01/03/2022    Body mass index (BMI) of 24.0-24.9 in adult 01/03/2022    Benign prostatic hyperplasia without lower urinary tract symptoms 08/18/2021    Vitamin D deficiency 12/28/2020    Muscle cramps 11/02/2020    Hypertension associated with diabetes ([facility]) 03/09/2020    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[facility]) 10/20/2016    Retinopathy of right eye 08/29/2016    Microalbuminuria 09/28/2015    Hyperlipidemia 06/19/2014    Asthma 06/19/2014    Wears hearing aid 06/19/2014       Past Surgical History:  Past Surgical History:   Procedure Laterality Date    TONSILLECTOMY AND ADENOIDECTOMY      VASECTOMY         Allergies:  Penicillin g    Social History:  Social History     Socioeconomic History    Marital status:      Spouse name: Not on file    Number of children: Not on file    Years of education: Not on file    Highest education level: Not on file   Occupational History    Not on file   Tobacco Use    Smoking status: Never    Smokeless tobacco: Never    Tobacco comments:     avoid all tobacco products   Vaping Use    Vaping Use: Never used   Substance and Sexual Activity    Alcohol use: No     Alcohol/week: 0.0 oz    Drug use: No    Sexual activity: Yes     Partners: Female   Other Topics Concern    Not on file   Social History Narrative    Not on file     Social Determinants of Health     Financial Resource Strain: Low Risk     Difficulty of Paying Living Expenses: Not very hard   Food Insecurity: No Food Insecurity    Worried About Running Out of Food in the Last Year: Never true    Ran Out of Food in the Last Year: Never true   Transportation Needs: No Transportation Needs    Lack of Transportation (Medical): No    Lack of Transportation (Non-Medical): No   Physical  Activity: Insufficiently Active    Days of Exercise per Week: 3 days    Minutes of Exercise per Session: 20 min   Stress: No Stress Concern Present    Feeling of Stress : Not at all   Social Connections: Socially Integrated    Frequency of Communication with Friends and Family: More than three times a week    Frequency of Social Gatherings with Friends and Family: More than three times a week    Attends Confucianist Services: More than 4 times per year    Active Member of Clubs or Organizations: Yes    Attends Club or Organization Meetings: 1 to 4 times per year    Marital Status:    Intimate Partner Violence: Not At Risk    Fear of Current or Ex-Partner: No    Emotionally Abused: No    Physically Abused: No    Sexually Abused: No   Housing Stability: Low Risk     Unable to Pay for Housing in the Last Year: No    Number of Places Lived in the Last Year: 1    Unstable Housing in the Last Year: No       Family History:  Family History   Problem Relation Age of Onset    Asthma Mother     Diabetes Father     Psychiatric Illness Father         dementia    Heart Disease Neg Hx     Stroke Neg Hx     Cancer Neg Hx        Medications:    Current Outpatient Medications:     azithromycin (ZITHROMAX) 250 MG Tab, Take 500 mg on first day, then 250 mg daily till finish, Disp: 6 Tablet, Rfl: 0    montelukast (SINGULAIR) 10 MG Tab, Take 1 Tablet by mouth every day., Disp: 30 Tablet, Rfl: 3    predniSONE (DELTASONE) 20 MG Tab, Take 40 mg daily for 5 days, Disp: 30 Tablet, Rfl: 0    latanoprost (XALATAN) 0.005 % Solution, INSTILL 1 DROP INTO EACH EYE AT BEDTIME, Disp: , Rfl:     glimepiride (AMARYL) 1 MG tablet, Take 1 Tablet by mouth every morning., Disp: 60 Tablet, Rfl: 3    Empagliflozin (JARDIANCE) 25 MG Tab, Take 1 Tablet by mouth every day., Disp: 30 Tablet, Rfl: 3    Dulaglutide (TRULICITY) 3 MG/0.5ML Solution Pen-injector, INJECT 1 SYRINGE SUBCUTANEOUSLY ONCE A WEEK, Disp: 4 mL, Rfl: 3    omeprazole (PRILOSEC) 20 MG  delayed-release capsule, Take 1 capsule by mouth once daily, Disp: 30 Capsule, Rfl: 0    metFORMIN ER (GLUCOPHAGE XR) 500 MG TABLET SR 24 HR, Take 2 Tablets by mouth 2 times a day., Disp: 360 Tablet, Rfl: 3    pravastatin (PRAVACHOL) 20 MG Tab, Take 1 Tablet by mouth every evening., Disp: 30 Tablet, Rfl: 11    tamsulosin (FLOMAX) 0.4 MG capsule, TAKE 1 CAPSULE BY MOUTH ONCE DAILY 30  MINUTES  AFTER  BREAKFAST, Disp: 90 Capsule, Rfl: 0    Ubiquinol 100 MG Cap, Take 200 mg by mouth every day., Disp: 120 Capsule, Rfl: 3    lisinopril (PRINIVIL) 10 MG Tab, Take 1 Tablet by mouth every day., Disp: 90 Tablet, Rfl: 3    ondansetron (ZOFRAN ODT) 4 MG TABLET DISPERSIBLE, Take 1 Tablet by mouth every 6 hours as needed for Nausea (give PO if no IV route available)., Disp: 10 Tablet, Rfl: 0    timolol (TIMOPTIC) 0.5 % Solution, Administer 1 Drop into both eyes every day., Disp: , Rfl:     Cholecalciferol (VITAMIN D3) 2000 UNIT Cap, Take 1 Capsule by mouth every day., Disp: 90 Capsule, Rfl: 3    albuterol 108 (90 Base) MCG/ACT Aero Soln inhalation aerosol, USE 2 INHALATIONS EVERY 6 HOURS AS NEEDED FOR SHORTNESS OF BREATH (Patient taking differently: 2 Puffs every 6 hours as needed for Shortness of Breath. USE 2 INHALATIONS EVERY 6 HOURS AS NEEDED FOR SHORTNESS OF BREATH), Disp: 3 Each, Rfl: 1    Labs: Reviewed    Physical Examination:  Vital signs: There were no vitals taken for this visit. There is no height or weight on file to calculate BMI.    Assessment and Plan  -He is able to afford his medications at the current time, but it is a lot for him, he can not get PAP due to recent refi.   -Blood sugars have occasionally been too low in the 70s and symptoms of hypoglycemia.  - Medication   Metformin ER 1000 twice daily  GLP-1 Agent: Dulaglutide 3 mg once weekly  SGLT-2 Inhibitor:  Empagliflozin 25 mg once daily   Eliminate glimepiride temporarily.,  Only restart if blood sugars trend up and use possibly half a tab 0.5 mg with  biggest meal of the day    -Blood glucose and A1c target    However, more aggressive diabetic control is reasonable when tolerated.    2.  Cardiovascular  pcp to manage.     - Lifestyle changes     Focus on eating a Mediterranean-style diet, as seen above  Exercise 30 minutes daily at least 5 days/week, as tolerated.      Follow-up appointment in 8 weeks     Jese Key, PharmD, MS, BCACP, [facility]  Phone [phone] fax [phone]    This note was created using voice recognition software (Dragon). The accuracy of the dictation is limited by the abilities of the software. I have reviewed the note prior to signing, however some errors in grammar and context are still possible. If you have any questions related to this note please do not hesitate to contact our office.     12/10/20    CC:   Alvin Esquivel M.D.  No ref. provider found

## 2022-09-08 NOTE — PROGRESS NOTES
Jerome called reporting that he needs a refill of his eye drops Latanoprost. Computer states Jerome has an active Rx, called [facility] and spoke with Trudy. Trudy stated pt has a refill available. Called pt back and let him know- he will go  Rx now.

## 2022-09-09 ENCOUNTER — HOSPITAL ENCOUNTER (OUTPATIENT)
Dept: LAB | Facility: MEDICAL CENTER | Age: 67
End: 2022-09-09
Attending: ANESTHESIOLOGY
Payer: MEDICARE

## 2022-09-09 ENCOUNTER — HOSPITAL ENCOUNTER (OUTPATIENT)
Dept: CARDIOLOGY | Facility: MEDICAL CENTER | Age: 67
End: 2022-09-09
Attending: ANESTHESIOLOGY
Payer: MEDICARE

## 2022-09-09 DIAGNOSIS — Z01.810 PRE-OPERATIVE CARDIOVASCULAR EXAMINATION: ICD-10-CM

## 2022-09-09 LAB
ANION GAP SERPL CALC-SCNC: 16 MMOL/L (ref 7–16)
BASOPHILS # BLD AUTO: 0.4 % (ref 0–1.8)
BASOPHILS # BLD: 0.03 K/UL (ref 0–0.12)
BUN SERPL-MCNC: 20 MG/DL (ref 8–22)
CALCIUM SERPL-MCNC: 10 MG/DL (ref 8.5–10.5)
CHLORIDE SERPL-SCNC: 99 MMOL/L (ref 96–112)
CO2 SERPL-SCNC: 19 MMOL/L (ref 20–33)
CREAT SERPL-MCNC: 0.91 MG/DL (ref 0.5–1.4)
EKG IMPRESSION: NORMAL
EOSINOPHIL # BLD AUTO: 0 K/UL (ref 0–0.51)
EOSINOPHIL NFR BLD: 0 % (ref 0–6.9)
ERYTHROCYTE [DISTWIDTH] IN BLOOD BY AUTOMATED COUNT: 41.9 FL (ref 35.9–50)
GFR SERPLBLD CREATININE-BSD FMLA CKD-EPI: 91 ML/MIN/1.73 M 2
GLUCOSE SERPL-MCNC: 247 MG/DL (ref 65–99)
HCT VFR BLD AUTO: 35.4 % (ref 42–52)
HGB BLD-MCNC: 12.6 G/DL (ref 14–18)
IMM GRANULOCYTES # BLD AUTO: 0.04 K/UL (ref 0–0.11)
IMM GRANULOCYTES NFR BLD AUTO: 0.5 % (ref 0–0.9)
LYMPHOCYTES # BLD AUTO: 0.85 K/UL (ref 1–4.8)
LYMPHOCYTES NFR BLD: 10.2 % (ref 22–41)
MCH RBC QN AUTO: 33.2 PG (ref 27–33)
MCHC RBC AUTO-ENTMCNC: 35.6 G/DL (ref 33.7–35.3)
MCV RBC AUTO: 93.4 FL (ref 81.4–97.8)
MONOCYTES # BLD AUTO: 0.24 K/UL (ref 0–0.85)
MONOCYTES NFR BLD AUTO: 2.9 % (ref 0–13.4)
NEUTROPHILS # BLD AUTO: 7.15 K/UL (ref 1.82–7.42)
NEUTROPHILS NFR BLD: 86 % (ref 44–72)
NRBC # BLD AUTO: 0 K/UL
NRBC BLD-RTO: 0 /100 WBC
PLATELET # BLD AUTO: 287 K/UL (ref 164–446)
PMV BLD AUTO: 9.6 FL (ref 9–12.9)
POTASSIUM SERPL-SCNC: 4.8 MMOL/L (ref 3.6–5.5)
RBC # BLD AUTO: 3.79 M/UL (ref 4.7–6.1)
SODIUM SERPL-SCNC: 134 MMOL/L (ref 135–145)
WBC # BLD AUTO: 8.3 K/UL (ref 4.8–10.8)

## 2022-09-09 PROCEDURE — 36415 COLL VENOUS BLD VENIPUNCTURE: CPT

## 2022-09-09 PROCEDURE — 93005 ELECTROCARDIOGRAM TRACING: CPT

## 2022-09-09 PROCEDURE — 80048 BASIC METABOLIC PNL TOTAL CA: CPT

## 2022-09-09 PROCEDURE — 85025 COMPLETE CBC W/AUTO DIFF WBC: CPT

## 2022-09-09 PROCEDURE — 93010 ELECTROCARDIOGRAM REPORT: CPT | Performed by: INTERNAL MEDICINE

## 2022-09-12 DIAGNOSIS — U07.1 COVID-19: ICD-10-CM

## 2022-09-13 DIAGNOSIS — R39.9 LOWER URINARY TRACT SYMPTOMS (LUTS): ICD-10-CM

## 2022-09-13 RX ORDER — LATANOPROST 50 UG/ML
SOLUTION/ DROPS OPHTHALMIC
Qty: 7.5 ML | Refills: 0 | Status: SHIPPED | OUTPATIENT
Start: 2022-09-13 | End: 2023-11-22

## 2022-09-13 RX ORDER — OMEPRAZOLE 20 MG/1
CAPSULE, DELAYED RELEASE ORAL
Qty: 30 CAPSULE | Refills: 0 | Status: SHIPPED | OUTPATIENT
Start: 2022-09-13 | End: 2022-11-03

## 2022-09-13 NOTE — TELEPHONE ENCOUNTER
Received request via: Patient    Was the patient seen in the last year in this department? Yes    Does the patient have an active prescription (recently filled or refills available) for medication(s) requested?  yes

## 2022-09-14 RX ORDER — TAMSULOSIN HYDROCHLORIDE 0.4 MG/1
CAPSULE ORAL
Qty: 90 CAPSULE | Refills: 0 | Status: SHIPPED | OUTPATIENT
Start: 2022-09-14 | End: 2023-06-12 | Stop reason: SDUPTHER

## 2022-09-27 ENCOUNTER — PATIENT OUTREACH (OUTPATIENT)
Dept: HEALTH INFORMATION MANAGEMENT | Facility: OTHER | Age: 67
End: 2022-09-27
Payer: MEDICARE

## 2022-09-27 DIAGNOSIS — E11.319 TYPE 2 DIABETES MELLITUS WITH RETINOPATHY OF BOTH EYES, WITHOUT LONG-TERM CURRENT USE OF INSULIN, MACULAR EDEMA PRESENCE UNSPECIFIED, UNSPECIFIED RETINOPATHY SEVERITY (HCC): ICD-10-CM

## 2022-10-10 DIAGNOSIS — E11.29 TYPE 2 DIABETES MELLITUS WITH ALBUMINURIA (HCC): ICD-10-CM

## 2022-10-10 DIAGNOSIS — R80.9 TYPE 2 DIABETES MELLITUS WITH ALBUMINURIA (HCC): ICD-10-CM

## 2022-10-10 RX ORDER — EMPAGLIFLOZIN 25 MG/1
1 TABLET, FILM COATED ORAL DAILY
Qty: 30 TABLET | Refills: 0 | Status: SHIPPED | OUTPATIENT
Start: 2022-10-10 | End: 2022-10-26 | Stop reason: SDUPTHER

## 2022-10-14 ENCOUNTER — PATIENT OUTREACH (OUTPATIENT)
Dept: HEALTH INFORMATION MANAGEMENT | Facility: OTHER | Age: 67
End: 2022-10-14
Payer: MEDICARE

## 2022-10-14 DIAGNOSIS — E11.319 TYPE 2 DIABETES MELLITUS WITH RETINOPATHY OF BOTH EYES, WITHOUT LONG-TERM CURRENT USE OF INSULIN, MACULAR EDEMA PRESENCE UNSPECIFIED, UNSPECIFIED RETINOPATHY SEVERITY (HCC): ICD-10-CM

## 2022-10-14 DIAGNOSIS — E11.59 HYPERTENSION ASSOCIATED WITH DIABETES (HCC): ICD-10-CM

## 2022-10-14 DIAGNOSIS — I15.2 HYPERTENSION ASSOCIATED WITH DIABETES (HCC): ICD-10-CM

## 2022-10-14 NOTE — PROGRESS NOTES
Jerome called concerned over his Trulicity dosage. He just received medication in the mail and thinks he was sent the wrong dosage. He received the 3mg/0.5mL dosage. Reviewed his chart, confirmed he is on the correct dosage. Jerome has an upcoming appt w/Alvin on 10/26/22. Jerome had no other questions or concerns.

## 2022-10-26 ENCOUNTER — OFFICE VISIT (OUTPATIENT)
Dept: MEDICAL GROUP | Facility: PHYSICIAN GROUP | Age: 67
End: 2022-10-26
Payer: MEDICARE

## 2022-10-26 VITALS
SYSTOLIC BLOOD PRESSURE: 132 MMHG | HEART RATE: 80 BPM | BODY MASS INDEX: 25.99 KG/M2 | OXYGEN SATURATION: 96 % | DIASTOLIC BLOOD PRESSURE: 64 MMHG | HEIGHT: 65 IN | RESPIRATION RATE: 14 BRPM | WEIGHT: 156 LBS | TEMPERATURE: 98 F

## 2022-10-26 DIAGNOSIS — I15.2 HYPERTENSION ASSOCIATED WITH DIABETES (HCC): ICD-10-CM

## 2022-10-26 DIAGNOSIS — E11.319 TYPE 2 DIABETES MELLITUS WITH RETINOPATHY OF BOTH EYES, WITHOUT LONG-TERM CURRENT USE OF INSULIN, MACULAR EDEMA PRESENCE UNSPECIFIED, UNSPECIFIED RETINOPATHY SEVERITY (HCC): ICD-10-CM

## 2022-10-26 DIAGNOSIS — E78.2 MIXED HYPERLIPIDEMIA: ICD-10-CM

## 2022-10-26 DIAGNOSIS — E11.29 TYPE 2 DIABETES MELLITUS WITH ALBUMINURIA (HCC): ICD-10-CM

## 2022-10-26 DIAGNOSIS — E11.59 HYPERTENSION ASSOCIATED WITH DIABETES (HCC): ICD-10-CM

## 2022-10-26 DIAGNOSIS — R80.9 TYPE 2 DIABETES MELLITUS WITH ALBUMINURIA (HCC): ICD-10-CM

## 2022-10-26 LAB
HBA1C MFR BLD: 6.3 % (ref 0–5.6)
INT CON NEG: NEGATIVE
INT CON POS: POSITIVE

## 2022-10-26 PROCEDURE — 83036 HEMOGLOBIN GLYCOSYLATED A1C: CPT | Performed by: INTERNAL MEDICINE

## 2022-10-26 PROCEDURE — 99214 OFFICE O/P EST MOD 30 MIN: CPT | Performed by: INTERNAL MEDICINE

## 2022-10-26 RX ORDER — DULAGLUTIDE 3 MG/.5ML
INJECTION, SOLUTION SUBCUTANEOUS
Qty: 4 ML | Refills: 3 | Status: SHIPPED | OUTPATIENT
Start: 2022-10-26 | End: 2023-10-31

## 2022-10-26 RX ORDER — GLIMEPIRIDE 1 MG/1
1 TABLET ORAL EVERY MORNING
Qty: 90 TABLET | Refills: 3 | Status: SHIPPED | OUTPATIENT
Start: 2022-10-26 | End: 2023-06-29

## 2022-10-26 RX ORDER — PRAVASTATIN SODIUM 20 MG
20 TABLET ORAL NIGHTLY
Qty: 90 TABLET | Refills: 3 | Status: SHIPPED | OUTPATIENT
Start: 2022-10-26 | End: 2023-11-08

## 2022-10-26 RX ORDER — LISINOPRIL 10 MG/1
10 TABLET ORAL DAILY
Qty: 90 TABLET | Refills: 3 | Status: SHIPPED | OUTPATIENT
Start: 2022-10-26 | End: 2023-11-01

## 2022-10-26 RX ORDER — EMPAGLIFLOZIN 25 MG/1
1 TABLET, FILM COATED ORAL DAILY
Qty: 90 TABLET | Refills: 1 | Status: SHIPPED | OUTPATIENT
Start: 2022-10-26 | End: 2023-01-04

## 2022-10-26 RX ORDER — METFORMIN HYDROCHLORIDE 500 MG/1
1000 TABLET, EXTENDED RELEASE ORAL 2 TIMES DAILY
Qty: 360 TABLET | Refills: 3 | Status: SHIPPED | OUTPATIENT
Start: 2022-10-26 | End: 2023-06-12 | Stop reason: SDUPTHER

## 2022-10-26 ASSESSMENT — FIBROSIS 4 INDEX: FIB4 SCORE: 0.96

## 2022-10-26 NOTE — PROGRESS NOTES
Established Patient    Chief Complaint   Patient presents with    Follow-Up       Subjective:     HPI:   Jerome presents today with the following.    Patient Active Problem List    Diagnosis Date Noted    Dyslipidemia associated with type 2 diabetes mellitus ([facility]) 01/03/2022    Type 2 diabetes mellitus, without long-term current use of insulin ([facility]) 01/03/2022    Mild nonproliferative diabetic retinopathy of both eyes without macular edema associated with type 2 diabetes mellitus ([facility]) 01/03/2022    History of pulmonary embolus (PE) 01/03/2022    Disorder of arteries and arterioles ([facility]) 01/03/2022    Pulmonary embolism associated with COVID-19 ([facility]) 01/03/2022    Body mass index (BMI) of 24.0-24.9 in adult 01/03/2022    Benign prostatic hyperplasia without lower urinary tract symptoms 08/18/2021    Vitamin D deficiency 12/28/2020    Muscle cramps 11/02/2020    Hypertension associated with diabetes ([facility]) 03/09/2020    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[facility]) 10/20/2016    Retinopathy of right eye 08/29/2016    Microalbuminuria 09/28/2015    Hyperlipidemia 06/19/2014    Asthma 06/19/2014    Wears hearing aid 06/19/2014       Current Outpatient Medications on File Prior to Visit   Medication Sig Dispense Refill    tamsulosin (FLOMAX) 0.4 MG capsule TAKE 1 CAPSULE BY MOUTH ONCE DAILY 30  MINUTES  AFTER  BREAKFAST 90 Capsule 0    omeprazole (PRILOSEC) 20 MG delayed-release capsule Take 1 capsule by mouth once daily 30 Capsule 0    latanoprost (XALATAN) 0.005 % Solution INSTILL 1 DROP INTO EACH EYE AT BEDTIME 7.5 mL 0    azithromycin (ZITHROMAX) 250 MG Tab Take 500 mg on first day, then 250 mg daily till finish 6 Tablet 0    montelukast (SINGULAIR) 10 MG Tab Take 1 Tablet by mouth every day. 30 Tablet 3    predniSONE (DELTASONE) 20 MG Tab Take 40 mg daily for 5 days 10 Tablet 0    Ubiquinol 100 MG Cap Take 200 mg by mouth every day. 120 Capsule 3    ondansetron (ZOFRAN ODT) 4 MG TABLET DISPERSIBLE Take 1 Tablet by  "mouth every 6 hours as needed for Nausea (give PO if no IV route available). 10 Tablet 0    timolol (TIMOPTIC) 0.5 % Solution Administer 1 Drop into both eyes every day.      Cholecalciferol (VITAMIN D3) 2000 UNIT Cap Take 1 Capsule by mouth every day. 90 Capsule 3    albuterol 108 (90 Base) MCG/ACT Aero Soln inhalation aerosol USE 2 INHALATIONS EVERY 6 HOURS AS NEEDED FOR SHORTNESS OF BREATH (Patient taking differently: 2 Puffs every 6 hours as needed for Shortness of Breath. USE 2 INHALATIONS EVERY 6 HOURS AS NEEDED FOR SHORTNESS OF BREATH) 3 Each 1     No current facility-administered medications on file prior to visit.       Allergies, past medical history, past surgical history, family history, social history reviewed and updated    ROS:  All other systems reviewed and are negative except as stated in the HPI       Physical Exam:     /64 (BP Location: Left arm, Patient Position: Sitting, BP Cuff Size: Adult)   Pulse 80   Temp 36.7 °C (98 °F) (Temporal)   Resp 14   Ht 1.651 m (5' 5\")   Wt 70.8 kg (156 lb)   SpO2 96%   BMI 25.96 kg/m²   General: Normal appearing. No distress.  Pulmonary: Clear to ausculation.  Normal effort.   Cardiovascular: Regular rate and rhythm    Assessment and Plan:     67 y.o. male with the following issues.    1. Type 2 diabetes mellitus with retinopathy of both eyes, without long-term current use of insulin, macular edema presence unspecified, unspecified retinopathy severity (HCC)  - Dulaglutide (TRULICITY) 3 MG/0.5ML Solution Pen-injector; INJECT 1 SYRINGE SUBCUTANEOUSLY ONCE A WEEK  Dispense: 4 mL; Refill: 3    2. 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  Dispense: 360 Tablet; Refill: 3  - Empagliflozin (JARDIANCE) 25 MG Tab; Take 1 Tablet by mouth every day.  Dispense: 90 Tablet; Refill: 1  - Dulaglutide (TRULICITY) 3 MG/0.5ML Solution Pen-injector; INJECT 1 SYRINGE SUBCUTANEOUSLY ONCE A WEEK  Dispense: 4 mL; Refill: 3    -Discussed diabetic diet in detail, educated regarding management of hypoglycemia, advised regular exercise, educated disease management and weight goals as well as feet care and frequent check of feet.  -Patient to check early morning fasting blood glucose with goal discussed.  - asked to have a BG log with daily fasting and 2 hr postprandial after biggest meal and bring to next visit or send through my chart  -Discussed side effects of medication. Patient confirmed understanding of information.    3. Hypertension associated with diabetes (HCC)  - lisinopril (PRINIVIL) 10 MG Tab; Take 1 Tablet by mouth every day.  Dispense: 90 Tablet; Refill: 3  Blood pressure well controlled    4. Mixed hyperlipidemia  Continue statin, denies any side effects  - pravastatin (PRAVACHOL) 20 MG Tab; Take 1 Tablet by mouth every evening.  Dispense: 90 Tablet; Refill: 3    Follow Up:      Return in about 3 months (around 1/26/2023) for diabetes, A1c.    Please note that this dictation was created using voice recognition software. I have made every reasonable attempt to correct obvious errors, but I expect that there are errors of grammar and possibly content that I did not discover before finalizing the note.    Signed by: Alvin Esquivel M.D.

## 2022-11-02 DIAGNOSIS — U07.1 COVID-19: ICD-10-CM

## 2022-11-03 RX ORDER — OMEPRAZOLE 20 MG/1
CAPSULE, DELAYED RELEASE ORAL
Qty: 30 CAPSULE | Refills: 0 | Status: SHIPPED | OUTPATIENT
Start: 2022-11-03 | End: 2023-01-04

## 2022-11-09 ENCOUNTER — DOCUMENTATION (OUTPATIENT)
Dept: HEALTH INFORMATION MANAGEMENT | Facility: OTHER | Age: 67
End: 2022-11-09
Payer: MEDICARE

## 2022-11-18 ENCOUNTER — PATIENT OUTREACH (OUTPATIENT)
Dept: HEALTH INFORMATION MANAGEMENT | Facility: OTHER | Age: 67
End: 2022-11-18

## 2022-11-18 ENCOUNTER — PATIENT OUTREACH (OUTPATIENT)
Dept: HEALTH INFORMATION MANAGEMENT | Facility: OTHER | Age: 67
End: 2022-11-18
Payer: MEDICARE

## 2022-11-18 DIAGNOSIS — E11.319 TYPE 2 DIABETES MELLITUS WITH RETINOPATHY OF BOTH EYES, WITHOUT LONG-TERM CURRENT USE OF INSULIN, MACULAR EDEMA PRESENCE UNSPECIFIED, UNSPECIFIED RETINOPATHY SEVERITY (HCC): ICD-10-CM

## 2022-11-18 DIAGNOSIS — I15.2 HYPERTENSION ASSOCIATED WITH DIABETES (HCC): ICD-10-CM

## 2022-11-18 DIAGNOSIS — E11.69 DYSLIPIDEMIA ASSOCIATED WITH TYPE 2 DIABETES MELLITUS (HCC): ICD-10-CM

## 2022-11-18 DIAGNOSIS — R80.9 TYPE 2 DIABETES MELLITUS WITH ALBUMINURIA (HCC): ICD-10-CM

## 2022-11-18 DIAGNOSIS — E11.59 HYPERTENSION ASSOCIATED WITH DIABETES (HCC): ICD-10-CM

## 2022-11-18 DIAGNOSIS — E11.29 TYPE 2 DIABETES MELLITUS WITH ALBUMINURIA (HCC): ICD-10-CM

## 2022-11-18 DIAGNOSIS — E78.5 DYSLIPIDEMIA ASSOCIATED WITH TYPE 2 DIABETES MELLITUS (HCC): ICD-10-CM

## 2022-11-18 PROCEDURE — 99490 CHRNC CARE MGMT STAFF 1ST 20: CPT | Performed by: INTERNAL MEDICINE

## 2022-11-18 NOTE — PROGRESS NOTES
Called pt for monthly CCM outreach. Jerome says he is doing well. Reports he has been taking Sugar Balance over the counter supplement and he contributes his current HgbA1c. Last HgbA1c was 6.3 on 10/26/22. Last few Bp readings in clinic 134/64 (10/26/22), 118/68 (9/8/22) and 118/64 (7/27/22). Discussed vaccines, Jerome is willing to take Tdap, marked his next appt for Tdap administration. Jerome had no other questions or concerns. Next outreach 12/16/22.

## 2022-11-21 NOTE — PROGRESS NOTES
"Called Jerome for his monthly [facility] outreach. Jerome reports he is doing ok. Reviewed chart and appts. Last few blood pressure readings in the chart have been 134/64 (10/26/22), 118/68 (9/8/22) and 118/64 (7/27/22). His last HgbA1c was 6.3% on 10/26/22. Asked Jerome what he contributed his low A1c score to? He said he has been taking Sugar Balance supplement for the last couple of months and believes that has reduced his HgbA1c. He has renewed his [employer] [facility] application for free Trulicity for 2023. His next pcp visit is on 1/25/23. Aksed about getting flu vaccine- Jerome replied, \"I don't do those\". Jerome also does not believe in getting the Covid vaccines. Asked if he wanted to update his Tdap? He agrees to get that vaccine- noted next pcp appt of need to administer. Jerome declined Shingrix vaccines also. No other questions or concerns. Next CCM outreach 12/16/22.  "

## 2022-12-19 ENCOUNTER — PATIENT OUTREACH (OUTPATIENT)
Dept: HEALTH INFORMATION MANAGEMENT | Facility: OTHER | Age: 67
End: 2022-12-19
Payer: MEDICARE

## 2022-12-19 DIAGNOSIS — R80.9 TYPE 2 DIABETES MELLITUS WITH ALBUMINURIA (HCC): Primary | ICD-10-CM

## 2022-12-19 DIAGNOSIS — E11.29 TYPE 2 DIABETES MELLITUS WITH ALBUMINURIA (HCC): Primary | ICD-10-CM

## 2022-12-19 DIAGNOSIS — I15.2 HYPERTENSION ASSOCIATED WITH DIABETES (HCC): ICD-10-CM

## 2022-12-19 DIAGNOSIS — E11.59 HYPERTENSION ASSOCIATED WITH DIABETES (HCC): ICD-10-CM

## 2022-12-19 DIAGNOSIS — R80.9 TYPE 2 DIABETES MELLITUS WITH ALBUMINURIA (HCC): ICD-10-CM

## 2022-12-19 DIAGNOSIS — E11.319 TYPE 2 DIABETES MELLITUS WITH RETINOPATHY OF BOTH EYES, WITHOUT LONG-TERM CURRENT USE OF INSULIN, MACULAR EDEMA PRESENCE UNSPECIFIED, UNSPECIFIED RETINOPATHY SEVERITY (HCC): ICD-10-CM

## 2022-12-19 DIAGNOSIS — E11.29 TYPE 2 DIABETES MELLITUS WITH ALBUMINURIA (HCC): ICD-10-CM

## 2022-12-19 DIAGNOSIS — B35.1 FUNGAL TOENAIL INFECTION: ICD-10-CM

## 2022-12-19 PROCEDURE — 99999 PR NO CHARGE: CPT | Performed by: INTERNAL MEDICINE

## 2022-12-19 NOTE — PROGRESS NOTES
"Received VM from pt with regards to diabetic foot care. Called pt back, Jerome is having a hard time \"seeing\" his feet well enough to cut his toe nails. He also reports his nails have thickened and it has become increasingly more difficult to trim them. Jerome is diabetic, last HgbA1c was 6.3 on 10/6/22. Will send referral request for Podiatry services to Dr. Esquivel. Told Jerome I would call him back once the referral has been processed.   "

## 2023-01-03 DIAGNOSIS — R80.9 TYPE 2 DIABETES MELLITUS WITH ALBUMINURIA (HCC): ICD-10-CM

## 2023-01-03 DIAGNOSIS — U07.1 COVID-19: ICD-10-CM

## 2023-01-03 DIAGNOSIS — E11.29 TYPE 2 DIABETES MELLITUS WITH ALBUMINURIA (HCC): ICD-10-CM

## 2023-01-04 RX ORDER — OMEPRAZOLE 20 MG/1
CAPSULE, DELAYED RELEASE ORAL
Qty: 30 CAPSULE | Refills: 0 | Status: SHIPPED | OUTPATIENT
Start: 2023-01-04 | End: 2023-05-05

## 2023-01-04 RX ORDER — EMPAGLIFLOZIN 25 MG/1
TABLET, FILM COATED ORAL
Qty: 30 TABLET | Refills: 0 | Status: SHIPPED | OUTPATIENT
Start: 2023-01-04 | End: 2023-09-28

## 2023-01-04 NOTE — TELEPHONE ENCOUNTER
Received request via: Pharmacy    Was the patient seen in the last year in this department? Yes    Does the patient have an active prescription (recently filled or refills available) for medication(s) requested? No    Does the patient have residential Plus and need 100 day supply (blood pressure, diabetes and cholesterol meds only)? Patient does not have SCP

## 2023-01-04 NOTE — TELEPHONE ENCOUNTER
Received request via: Pharmacy    Was the patient seen in the last year in this department? Yes    Does the patient have an active prescription (recently filled or refills available) for medication(s) requested? No    Does the patient have retirement Plus and need 100 day supply (blood pressure, diabetes and cholesterol meds only)? Medication is not for cholesterol, blood pressure or diabetes

## 2023-01-06 ENCOUNTER — PATIENT OUTREACH (OUTPATIENT)
Dept: HEALTH INFORMATION MANAGEMENT | Facility: OTHER | Age: 68
End: 2023-01-06
Payer: MEDICARE

## 2023-01-06 DIAGNOSIS — E11.29 TYPE 2 DIABETES MELLITUS WITH ALBUMINURIA (HCC): ICD-10-CM

## 2023-01-06 DIAGNOSIS — E11.59 HYPERTENSION ASSOCIATED WITH DIABETES (HCC): ICD-10-CM

## 2023-01-06 DIAGNOSIS — I15.2 HYPERTENSION ASSOCIATED WITH DIABETES (HCC): ICD-10-CM

## 2023-01-06 DIAGNOSIS — R80.9 TYPE 2 DIABETES MELLITUS WITH ALBUMINURIA (HCC): ICD-10-CM

## 2023-01-06 PROCEDURE — 99490 CHRNC CARE MGMT STAFF 1ST 20: CPT | Performed by: INTERNAL MEDICINE

## 2023-01-06 NOTE — PROGRESS NOTES
Jerome called reporting he went to have his hearings aids adjusted and was told his hearing had diminished by 20% from the last time he was tested/adjusted. Jerome receives these services from [facility] on [address]. Jerome had not been seen or had his hearing aids adjusted in 5 years. Jerome does not remember anyone telling him how important it is to have his hearing tested and to keep his hearing aids adjusted. Discussed the relationship between hearing loss and cognitive decline.     After disconnecting call, I reviewed chart for last AWV. Jerome had an AWV on 1/3/2022 by [facility]. Copied the Auditory Screening below. When Jerome comes in for his next pcp appt (1/    Interpretation:  > 1 At Risk  Zero  Auditory Screening (LSH):     1. Have you recognized or become aware that you have a hearing impairment? 0 - No   2. Have you been tested for hearing loss within the past 12 months? 0 - Yes   3. If 'yes' to question 1, have you addressed your hearing loss? 0 - Not Applicable   Total points:    0

## 2023-01-25 ENCOUNTER — OFFICE VISIT (OUTPATIENT)
Dept: MEDICAL GROUP | Facility: PHYSICIAN GROUP | Age: 68
End: 2023-01-25
Payer: MEDICARE

## 2023-01-25 VITALS
TEMPERATURE: 99.4 F | WEIGHT: 152 LBS | DIASTOLIC BLOOD PRESSURE: 76 MMHG | OXYGEN SATURATION: 98 % | SYSTOLIC BLOOD PRESSURE: 142 MMHG | RESPIRATION RATE: 14 BRPM | BODY MASS INDEX: 25.33 KG/M2 | HEIGHT: 65 IN | HEART RATE: 77 BPM

## 2023-01-25 DIAGNOSIS — B35.1 ONYCHOMYCOSIS OF GREAT TOE: ICD-10-CM

## 2023-01-25 DIAGNOSIS — H91.93 BILATERAL HEARING LOSS, UNSPECIFIED HEARING LOSS TYPE: ICD-10-CM

## 2023-01-25 DIAGNOSIS — E11.319 TYPE 2 DIABETES MELLITUS WITH RETINOPATHY OF BOTH EYES, WITHOUT LONG-TERM CURRENT USE OF INSULIN, MACULAR EDEMA PRESENCE UNSPECIFIED, UNSPECIFIED RETINOPATHY SEVERITY (HCC): ICD-10-CM

## 2023-01-25 PROCEDURE — 99214 OFFICE O/P EST MOD 30 MIN: CPT | Performed by: INTERNAL MEDICINE

## 2023-01-25 ASSESSMENT — FIBROSIS 4 INDEX: FIB4 SCORE: 0.96

## 2023-01-25 ASSESSMENT — PATIENT HEALTH QUESTIONNAIRE - PHQ9: CLINICAL INTERPRETATION OF PHQ2 SCORE: 0

## 2023-01-25 NOTE — PROGRESS NOTES
Established Patient    Chief Complaint   Patient presents with    Follow-Up    Hearing Problem       Subjective:     HPI:   Jerome presents today with the following.    Patient Active Problem List    Diagnosis Date Noted    Onychomycosis of great toe 01/25/2023    Dyslipidemia associated with type 2 diabetes mellitus ([facility]) 01/03/2022    Type 2 diabetes mellitus, without long-term current use of insulin ([facility]) 01/03/2022    Mild nonproliferative diabetic retinopathy of both eyes without macular edema associated with type 2 diabetes mellitus ([facility]) 01/03/2022    History of pulmonary embolus (PE) 01/03/2022    Disorder of arteries and arterioles ([facility]) 01/03/2022    Pulmonary embolism associated with COVID-19 ([facility]) 01/03/2022    Body mass index (BMI) of 24.0-24.9 in adult 01/03/2022    Benign prostatic hyperplasia without lower urinary tract symptoms 08/18/2021    Vitamin D deficiency 12/28/2020    Muscle cramps 11/02/2020    Hypertension associated with diabetes ([facility]) 03/09/2020    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 360 Tablet 3    glimepiride (AMARYL) 1 MG tablet Take 1 Tablet by mouth every morning. 90 Tablet 3    Dulaglutide (TRULICITY) 3 MG/0.5ML Solution Pen-injector INJECT 1 SYRINGE SUBCUTANEOUSLY ONCE A WEEK 4 mL 3    lisinopril (PRINIVIL) 10 MG Tab Take 1 Tablet by mouth every day. 90 Tablet 3    pravastatin (PRAVACHOL) 20 MG Tab Take 1 Tablet by mouth  "every evening. 90 Tablet 3    tamsulosin (FLOMAX) 0.4 MG capsule TAKE 1 CAPSULE BY MOUTH ONCE DAILY 30  MINUTES  AFTER  BREAKFAST 90 Capsule 0    latanoprost (XALATAN) 0.005 % Solution INSTILL 1 DROP INTO EACH EYE AT BEDTIME 7.5 mL 0    azithromycin (ZITHROMAX) 250 MG Tab Take 500 mg on first day, then 250 mg daily till finish 6 Tablet 0    montelukast (SINGULAIR) 10 MG Tab Take 1 Tablet by mouth every day. 30 Tablet 3    Ubiquinol 100 MG Cap Take 200 mg by mouth every day. 120 Capsule 3    ondansetron (ZOFRAN ODT) 4 MG TABLET DISPERSIBLE Take 1 Tablet by mouth every 6 hours as needed for Nausea (give PO if no IV route available). 10 Tablet 0    timolol (TIMOPTIC) 0.5 % Solution Administer 1 Drop into both eyes every day.      Cholecalciferol (VITAMIN D3) 2000 UNIT Cap Take 1 Capsule by mouth every day. 90 Capsule 3    albuterol 108 (90 Base) MCG/ACT Aero Soln inhalation aerosol USE 2 INHALATIONS EVERY 6 HOURS AS NEEDED FOR SHORTNESS OF BREATH (Patient taking differently: 2 Puffs every 6 hours as needed for Shortness of Breath. USE 2 INHALATIONS EVERY 6 HOURS AS NEEDED FOR SHORTNESS OF BREATH) 3 Each 1     No current facility-administered medications on file prior to visit.       Allergies, past medical history, past surgical history, family history, social history reviewed and updated    ROS:  All other systems reviewed and are negative except as stated in the HPI       Physical Exam:     BP (!) 142/76 (BP Location: Left arm, Patient Position: Sitting, BP Cuff Size: Adult)   Pulse 77   Temp 37.4 °C (99.4 °F) (Temporal)   Resp 14   Ht 1.651 m (5' 5\")   Wt 68.9 kg (152 lb)   SpO2 98%   BMI 25.29 kg/m²   General: Normal appearing. No distress.  Pulmonary: Clear to ausculation.  Normal effort.   Cardiovascular: Regular rate and rhythm    Assessment and Plan:     67 y.o. male with the following issues.    1. Bilateral hearing loss, unspecified hearing loss type  Would like referral to ENT for hearing evaluation, " he is currently seen by audiologist, he has also hearing aids Referral to ENT    2. Onychomycosis of great toe  would like to be seen by podiatrist for the above as well as has some visual issues not able to cut his nails properly   - Referral to Podiatry    3. Type 2 diabetes mellitus with retinopathy of both eyes, without long-term current use of insulin, macular edema presence unspecified, unspecified retinopathy severity (HCC)  Patient check blood sugar at home with normal range, he is    metformin 1000 twice daily, Jardiance 25 mg daily, Trulicity 3 mg weekly, glimepiride 1 mg daily, patient denied having symptoms related, he is currently also following up with ophthalmology for glaucoma    -Discussed diabetic diet in detail, educated regarding management of hypoglycemia, advised regular exercise, educated disease management and weight goals as well as feet care and frequent check of feet.  -Patient to check early morning fasting blood glucose with goal discussed.  - asked to have a BG log with daily fasting and 2 hr postprandial after biggest meal and bring to next visit or send through my chart  -Discussed side effects of medication. Patient confirmed understanding of information.    Follow Up:   3 months, A1c, diabetes    Please note that this dictation was created using voice recognition software. I have made every reasonable attempt to correct obvious errors, but I expect that there are errors of grammar and possibly content that I did not discover before finalizing the note.    Signed by: Alvin Esquivel M.D.

## 2023-02-07 ENCOUNTER — PATIENT OUTREACH (OUTPATIENT)
Dept: HEALTH INFORMATION MANAGEMENT | Facility: OTHER | Age: 68
End: 2023-02-07
Payer: MEDICARE

## 2023-02-07 DIAGNOSIS — I15.2 HYPERTENSION ASSOCIATED WITH DIABETES (HCC): ICD-10-CM

## 2023-02-07 DIAGNOSIS — R80.9 TYPE 2 DIABETES MELLITUS WITH ALBUMINURIA (HCC): ICD-10-CM

## 2023-02-07 DIAGNOSIS — E11.59 HYPERTENSION ASSOCIATED WITH DIABETES (HCC): ICD-10-CM

## 2023-02-07 DIAGNOSIS — E11.319 TYPE 2 DIABETES MELLITUS WITH RETINOPATHY OF BOTH EYES, WITHOUT LONG-TERM CURRENT USE OF INSULIN, MACULAR EDEMA PRESENCE UNSPECIFIED, UNSPECIFIED RETINOPATHY SEVERITY (HCC): ICD-10-CM

## 2023-02-07 DIAGNOSIS — E11.29 TYPE 2 DIABETES MELLITUS WITH ALBUMINURIA (HCC): ICD-10-CM

## 2023-02-07 PROCEDURE — 99999 PR NO CHARGE: CPT | Performed by: INTERNAL MEDICINE

## 2023-02-07 NOTE — PROGRESS NOTES
Jerome called back, we discussed scheduling recently completed referral to ENT. Gave Jerome the ENT provider's info: Axel Rutherford, [address], telephone [phone]. Jerome is also going to schedule with Podiatrist, Geronimo Angel [phone]. Jerome said he has been receiving dental work through a dentist he goes to [facility] with, he just found out his dentist will not accept the [facility] [place] dental plan. Jerome would like a list of dentists that accept the [facility] [place] plan- I told him we could mail a list. Will ask CRYSTAL Olvera to mail dental list. Jerome had no other questions, concerns. Next [facility] outreach 3/7/23.

## 2023-02-07 NOTE — PROGRESS NOTES
Jerome called and LVM over lunch asking for a return call. Called him back, no answer, LVM asking him to return my call.

## 2023-02-08 ENCOUNTER — PATIENT OUTREACH (OUTPATIENT)
Dept: HEALTH INFORMATION MANAGEMENT | Facility: OTHER | Age: 68
End: 2023-02-08
Payer: MEDICARE

## 2023-02-08 DIAGNOSIS — E11.29 TYPE 2 DIABETES MELLITUS WITH ALBUMINURIA (HCC): ICD-10-CM

## 2023-02-08 DIAGNOSIS — R80.9 TYPE 2 DIABETES MELLITUS WITH ALBUMINURIA (HCC): ICD-10-CM

## 2023-02-08 NOTE — PROGRESS NOTES
2/8/2023   Jerome completed his monthly outreach with Lovely Oliver RN, yesterday, 2/8/2023. During the phone call Jerome stated that he'd like a list of [facility] providers. This CHW mailed off a packet that included [facility] Providers.  PLAN: CHW will collaborate with CCM RN to assist with pt's health goals.

## 2023-02-09 DIAGNOSIS — J45.40 MODERATE PERSISTENT ASTHMA WITHOUT COMPLICATION: ICD-10-CM

## 2023-02-13 ENCOUNTER — TELEPHONE (OUTPATIENT)
Dept: HEALTH INFORMATION MANAGEMENT | Facility: OTHER | Age: 68
End: 2023-02-13
Payer: MEDICARE

## 2023-02-13 RX ORDER — ALBUTEROL SULFATE 90 UG/1
AEROSOL, METERED RESPIRATORY (INHALATION)
Qty: 3 EACH | Refills: 1 | Status: SHIPPED | OUTPATIENT
Start: 2023-02-13 | End: 2023-09-08

## 2023-02-13 NOTE — TELEPHONE ENCOUNTER
Jerome called asking about Albuterol inhaler- he said he spoke with a couple of people at the [facility] last week requesting a refill of med. Reviewed chart- let Jerome know the refill was sent this morning, received a receipt in response, [facility] [address] [facility]. Jerome said he would contact Alicia.

## 2023-03-17 ENCOUNTER — PATIENT OUTREACH (OUTPATIENT)
Dept: HEALTH INFORMATION MANAGEMENT | Facility: OTHER | Age: 68
End: 2023-03-17
Payer: MEDICARE

## 2023-03-17 DIAGNOSIS — I15.2 HYPERTENSION ASSOCIATED WITH DIABETES (HCC): ICD-10-CM

## 2023-03-17 DIAGNOSIS — E11.29 TYPE 2 DIABETES MELLITUS WITH ALBUMINURIA (HCC): ICD-10-CM

## 2023-03-17 DIAGNOSIS — R80.9 TYPE 2 DIABETES MELLITUS WITH ALBUMINURIA (HCC): ICD-10-CM

## 2023-03-17 DIAGNOSIS — E11.59 HYPERTENSION ASSOCIATED WITH DIABETES (HCC): ICD-10-CM

## 2023-03-17 NOTE — PROGRESS NOTES
Called Jerome for monthly CCM outreach. Jerome was able to establish with a dentiast after we sent him a dental resource list. He was also seen by Podiatry since my last outreach. Jerome stated he really liked the Podiatrist. His last HgbA1c was 6.3 on 10/26/22. He is still getting his Trulicity from the . No med changes since last call. No hospital admission or ED visits in the last year. His last few blood pressure readings in clinic: 142/76 (1/25/23), 132/64 (10/26/22) and 118/68 (9/8/22). Jerome's risk score has dropped from a 5 to a 2 in the last few months. Discussed removing Jerome from the CCM program due to how well Jerome has been handling his health. Jerome is in agreement. Offered Jerome the opportunity to be contacted over the next couple of months by the CHW to continue with health coaching? Jerome declined. Closing CCM program with health goals met.

## 2023-03-22 ENCOUNTER — OFFICE VISIT (OUTPATIENT)
Dept: MEDICAL GROUP | Facility: PHYSICIAN GROUP | Age: 68
End: 2023-03-22
Payer: MEDICARE

## 2023-03-22 VITALS
OXYGEN SATURATION: 100 % | DIASTOLIC BLOOD PRESSURE: 62 MMHG | HEART RATE: 76 BPM | BODY MASS INDEX: 26.82 KG/M2 | TEMPERATURE: 98.9 F | RESPIRATION RATE: 14 BRPM | HEIGHT: 65 IN | WEIGHT: 161 LBS | SYSTOLIC BLOOD PRESSURE: 142 MMHG

## 2023-03-22 DIAGNOSIS — R05.1 ACUTE COUGH: ICD-10-CM

## 2023-03-22 DIAGNOSIS — J45.40 MODERATE PERSISTENT ASTHMA WITHOUT COMPLICATION: ICD-10-CM

## 2023-03-22 DIAGNOSIS — E11.319 TYPE 2 DIABETES MELLITUS WITH RETINOPATHY OF BOTH EYES, WITHOUT LONG-TERM CURRENT USE OF INSULIN, MACULAR EDEMA PRESENCE UNSPECIFIED, UNSPECIFIED RETINOPATHY SEVERITY (HCC): ICD-10-CM

## 2023-03-22 LAB
HBA1C MFR BLD: 6.9 % (ref ?–5.8)
POCT INT CON NEG: NEGATIVE
POCT INT CON POS: POSITIVE

## 2023-03-22 PROCEDURE — 83036 HEMOGLOBIN GLYCOSYLATED A1C: CPT | Performed by: INTERNAL MEDICINE

## 2023-03-22 PROCEDURE — 99214 OFFICE O/P EST MOD 30 MIN: CPT | Performed by: INTERNAL MEDICINE

## 2023-03-22 ASSESSMENT — FIBROSIS 4 INDEX: FIB4 SCORE: 0.96

## 2023-03-22 NOTE — PROGRESS NOTES
Established Patient    Chief Complaint   Patient presents with    Follow-Up       Subjective:     HPI:   Jerome presents today with the following.    Patient Active Problem List    Diagnosis Date Noted    Onychomycosis of great toe 01/25/2023    Dyslipidemia associated with type 2 diabetes mellitus ([facility]) 01/03/2022    Type 2 diabetes mellitus, without long-term current use of insulin ([facility]) 01/03/2022    Mild nonproliferative diabetic retinopathy of both eyes without macular edema associated with type 2 diabetes mellitus ([facility]) 01/03/2022    History of pulmonary embolus (PE) 01/03/2022    Disorder of arteries and arterioles ([facility]) 01/03/2022    Pulmonary embolism associated with COVID-19 ([facility]) 01/03/2022    Body mass index (BMI) of 24.0-24.9 in adult 01/03/2022    Benign prostatic hyperplasia without lower urinary tract symptoms 08/18/2021    Vitamin D deficiency 12/28/2020    Muscle cramps 11/02/2020    Hypertension associated with diabetes ([facility]) 03/09/2020    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[facility]) 10/20/2016    Retinopathy of right eye 08/29/2016    Microalbuminuria 09/28/2015    Hyperlipidemia 06/19/2014    Asthma 06/19/2014    Bilateral hearing loss 06/19/2014    Wears hearing aid 06/19/2014       Current Outpatient Medications on File Prior to Visit   Medication Sig Dispense Refill    albuterol 108 (90 Base) MCG/ACT Aero Soln inhalation aerosol USE 2 INHALATIONS EVERY 6 HOURS AS NEEDED FOR SHORTNESS OF BREATH 3 Each 1    omeprazole (PRILOSEC) 20 MG delayed-release capsule Take 1 capsule by mouth once daily 30 Capsule 0    JARDIANCE 25 MG Tab Take 1 tablet by mouth once daily 30 Tablet 0    metFORMIN ER (GLUCOPHAGE XR) 500 MG TABLET SR 24 HR Take 2 Tablets by mouth 2 times a day. 360 Tablet 3    glimepiride (AMARYL) 1 MG tablet Take 1 Tablet by mouth every morning. 90 Tablet 3    Dulaglutide (TRULICITY) 3 MG/0.5ML Solution Pen-injector INJECT 1 SYRINGE SUBCUTANEOUSLY ONCE A WEEK 4 mL 3    lisinopril  "(PRINIVIL) 10 MG Tab Take 1 Tablet by mouth every day. 90 Tablet 3    pravastatin (PRAVACHOL) 20 MG Tab Take 1 Tablet by mouth every evening. 90 Tablet 3    tamsulosin (FLOMAX) 0.4 MG capsule TAKE 1 CAPSULE BY MOUTH ONCE DAILY 30  MINUTES  AFTER  BREAKFAST 90 Capsule 0    latanoprost (XALATAN) 0.005 % Solution INSTILL 1 DROP INTO EACH EYE AT BEDTIME 7.5 mL 0    montelukast (SINGULAIR) 10 MG Tab Take 1 Tablet by mouth every day. 30 Tablet 3    Ubiquinol 100 MG Cap Take 200 mg by mouth every day. 120 Capsule 3    ondansetron (ZOFRAN ODT) 4 MG TABLET DISPERSIBLE Take 1 Tablet by mouth every 6 hours as needed for Nausea (give PO if no IV route available). 10 Tablet 0    timolol (TIMOPTIC) 0.5 % Solution Administer 1 Drop into both eyes every day.      Cholecalciferol (VITAMIN D3) 2000 UNIT Cap Take 1 Capsule by mouth every day. 90 Capsule 3    azithromycin (ZITHROMAX) 250 MG Tab Take 500 mg on first day, then 250 mg daily till finish (Patient not taking: Reported on 3/22/2023) 6 Tablet 0     No current facility-administered medications on file prior to visit.       Allergies, past medical history, past surgical history, family history, social history reviewed and updated    ROS:  All other systems reviewed and are negative except as stated in the HPI       Physical Exam:     BP (!) 142/62 (BP Location: Right arm, Patient Position: Sitting, BP Cuff Size: Adult)   Pulse 76   Temp 37.2 °C (98.9 °F) (Temporal)   Resp 14   Ht 1.651 m (5' 5\")   Wt 73 kg (161 lb)   SpO2 100%   BMI 26.79 kg/m²   General: Normal appearing. No distress.  Pulmonary: Clear to ausculation.  Normal effort.   Cardiovascular: Regular rate and rhythm    Assessment and Plan:     67 y.o. male with the following issues.      3. Moderate persistent asthma without complication  1. Acute cough  This is going on for 1 week or so, its mostly dry, denies any shortness of breath or wheezing or exacerbation of asthma, denies any sore throat denies any runny " nose or sinus issues, denies any fever or chills, possibly some viral complication, he is inhaling albuterol 2-3 times a day as needed, educated regarding conservative management and over-the-counter cough syrup as well    2. Type 2 diabetes mellitus with retinopathy of both eyes, without long-term current use of insulin, macular edema presence unspecified, unspecified retinopathy severity (HCC)    - POCT Hemoglobin A1C> today A1c 6.9, little bit high from before, he has some access to sugar and carbs as well, educated regarding diabetic diet  metformin 1000 twice daily, Jardiance 25 mg daily, Trulicity 3 mg weekly, glimepiride 1 mg daily, patient denied having symptoms related    -Discussed diabetic diet in detail, educated regarding management of hypoglycemia, advised regular exercise, educated disease management and weight goals as well as feet care and frequent check of feet.  -Patient to check early morning fasting blood glucose with goal discussed.  - asked to have a BG log with daily fasting and 2 hr postprandial after biggest meal and bring to next visit or send through my chart  -Discussed side effects of medication. Patient confirmed understanding of information.    Follow Up:   3 months    Please note that this dictation was created using voice recognition software. I have made every reasonable attempt to correct obvious errors, but I expect that there are errors of grammar and possibly content that I did not discover before finalizing the note.    Signed by: Alvin Esquivel M.D.

## 2023-03-29 ENCOUNTER — TELEPHONE (OUTPATIENT)
Dept: MEDICAL GROUP | Facility: PHYSICIAN GROUP | Age: 68
End: 2023-03-29
Payer: MEDICARE

## 2023-03-29 DIAGNOSIS — R05.1 ACUTE COUGH: ICD-10-CM

## 2023-03-29 DIAGNOSIS — J45.40 MODERATE PERSISTENT ASTHMA WITHOUT COMPLICATION: ICD-10-CM

## 2023-03-29 RX ORDER — PREDNISONE 20 MG/1
TABLET ORAL
Qty: 10 TABLET | Refills: 0 | Status: SHIPPED | OUTPATIENT
Start: 2023-03-29 | End: 2023-09-28

## 2023-03-29 NOTE — TELEPHONE ENCOUNTER
Called Jerome and informed of steroid Rx, he is aware his blood glucose level will rise with steroids.

## 2023-03-29 NOTE — TELEPHONE ENCOUNTER
Jerome called, he reports that when he was in the office last week he discussed getting steroids with Dr. Esquivel for respiratory symptoms. He has been using the Mucinex DM, but it is not helping. He is needing to use his Albuterol inhaler multiple times a day.     Requesting steroid Rx be sent to [facility] today if possible.

## 2023-04-07 ENCOUNTER — HOSPITAL ENCOUNTER (OUTPATIENT)
Dept: RADIOLOGY | Facility: MEDICAL CENTER | Age: 68
End: 2023-04-07
Attending: PODIATRIST
Payer: MEDICARE

## 2023-04-07 DIAGNOSIS — I73.9 PERIPHERAL VASCULAR DISEASE, UNSPECIFIED (HCC): ICD-10-CM

## 2023-04-07 PROCEDURE — 93926 LOWER EXTREMITY STUDY: CPT | Mod: LT

## 2023-04-07 PROCEDURE — 93922 UPR/L XTREMITY ART 2 LEVELS: CPT

## 2023-04-12 ENCOUNTER — TELEPHONE (OUTPATIENT)
Dept: VASCULAR LAB | Facility: MEDICAL CENTER | Age: 68
End: 2023-04-12
Payer: MEDICARE

## 2023-04-12 NOTE — TELEPHONE ENCOUNTER
Called pt regarding missed pharmacotherapy appt - pt was indisposed at the time of my call. He requests c/b at a later time.    Stan Morales, LitoD, BCACP

## 2023-05-01 ENCOUNTER — TELEPHONE (OUTPATIENT)
Dept: VASCULAR LAB | Facility: MEDICAL CENTER | Age: 68
End: 2023-05-01
Payer: MEDICARE

## 2023-05-01 NOTE — TELEPHONE ENCOUNTER
Called pt regarding missed pharmacotherapy appt - no answer. Unable to LVM asking pt to c/b to reschedule.     Will f/u at a later time.    Stan Morales, LitoD, BCACP

## 2023-05-02 ENCOUNTER — TELEPHONE (OUTPATIENT)
Dept: HEALTH INFORMATION MANAGEMENT | Facility: OTHER | Age: 68
End: 2023-05-02
Payer: MEDICARE

## 2023-05-19 ENCOUNTER — TELEPHONE (OUTPATIENT)
Dept: HEALTH INFORMATION MANAGEMENT | Facility: OTHER | Age: 68
End: 2023-05-19
Payer: MEDICARE

## 2023-05-19 NOTE — TELEPHONE ENCOUNTER
Jerome called and [facility], returned his call. Jerome received letter stating Dr. Esquivel is no longer with [facility]. Jerome questioning who he should establish care with? Cancelled the 6/29 appt w/Alvin and scheduled a 6/29 appt w/ Thee.     Jerome also questioned credit card he received in mail from [facility]? Advised I think it has to do with the [facility] Catalog. Advised Jerome I will check-in w/[facility] and confirm what the card is for, I will call Jerome next week and update him. Jerome also asked why his spouse (also with [facility]) did not get a card? Told Jerome I would check on that also.

## 2023-05-22 ENCOUNTER — TELEPHONE (OUTPATIENT)
Dept: MEDICAL GROUP | Facility: PHYSICIAN GROUP | Age: 68
End: 2023-05-22
Payer: MEDICARE

## 2023-05-22 NOTE — TELEPHONE ENCOUNTER
Called Jerome and informed him the Mastercard sent by [facility] is being used as an incentive for pts to get the medical services they need to complete. A dollar amount will be added to the card if pts get their Colonoscopy, if they complete an AWE, etc...   Told Jerome I sent out a list of services that he can receive money for if he completes the services and abides by the guidelines. The mail should reach Jerome by the end of the week. Jerome was thankful for call.

## 2023-06-12 ENCOUNTER — TELEPHONE (OUTPATIENT)
Dept: URGENT CARE | Facility: PHYSICIAN GROUP | Age: 68
End: 2023-06-12
Payer: MEDICARE

## 2023-06-12 DIAGNOSIS — E11.29 TYPE 2 DIABETES MELLITUS WITH ALBUMINURIA (HCC): ICD-10-CM

## 2023-06-12 DIAGNOSIS — R80.9 TYPE 2 DIABETES MELLITUS WITH ALBUMINURIA (HCC): ICD-10-CM

## 2023-06-12 DIAGNOSIS — R39.9 LOWER URINARY TRACT SYMPTOMS (LUTS): ICD-10-CM

## 2023-06-12 RX ORDER — METFORMIN HYDROCHLORIDE 500 MG/1
1000 TABLET, EXTENDED RELEASE ORAL 2 TIMES DAILY
Qty: 360 TABLET | Refills: 3 | Status: SHIPPED | OUTPATIENT
Start: 2023-06-12

## 2023-06-12 RX ORDER — TAMSULOSIN HYDROCHLORIDE 0.4 MG/1
CAPSULE ORAL
Qty: 90 CAPSULE | Refills: 0 | Status: SHIPPED | OUTPATIENT
Start: 2023-06-12 | End: 2023-09-26 | Stop reason: SDUPTHER

## 2023-06-12 NOTE — TELEPHONE ENCOUNTER
Pt called he is needing a refill for his Metformin 500mg & Tamsulosin 0.4mg  as he is completely out. Pt has an appointment scheduled at the end of this month. Pt asked for a call once the refills are placed.

## 2023-06-19 ENCOUNTER — DOCUMENTATION (OUTPATIENT)
Dept: HEALTH INFORMATION MANAGEMENT | Facility: OTHER | Age: 68
End: 2023-06-19
Payer: MEDICARE

## 2023-06-29 ENCOUNTER — OFFICE VISIT (OUTPATIENT)
Dept: MEDICAL GROUP | Facility: PHYSICIAN GROUP | Age: 68
End: 2023-06-29
Payer: MEDICARE

## 2023-06-29 VITALS
DIASTOLIC BLOOD PRESSURE: 72 MMHG | HEIGHT: 65 IN | BODY MASS INDEX: 25.66 KG/M2 | HEART RATE: 75 BPM | TEMPERATURE: 97.1 F | SYSTOLIC BLOOD PRESSURE: 134 MMHG | RESPIRATION RATE: 14 BRPM | WEIGHT: 154 LBS

## 2023-06-29 DIAGNOSIS — E11.69 DYSLIPIDEMIA ASSOCIATED WITH TYPE 2 DIABETES MELLITUS (HCC): ICD-10-CM

## 2023-06-29 DIAGNOSIS — I15.2 HYPERTENSION ASSOCIATED WITH DIABETES (HCC): ICD-10-CM

## 2023-06-29 DIAGNOSIS — E11.319 TYPE 2 DIABETES MELLITUS WITH RETINOPATHY OF BOTH EYES, WITHOUT LONG-TERM CURRENT USE OF INSULIN, MACULAR EDEMA PRESENCE UNSPECIFIED, UNSPECIFIED RETINOPATHY SEVERITY (HCC): ICD-10-CM

## 2023-06-29 DIAGNOSIS — R80.9 TYPE 2 DIABETES MELLITUS WITH ALBUMINURIA (HCC): ICD-10-CM

## 2023-06-29 DIAGNOSIS — E78.5 DYSLIPIDEMIA ASSOCIATED WITH TYPE 2 DIABETES MELLITUS (HCC): ICD-10-CM

## 2023-06-29 DIAGNOSIS — E11.29 TYPE 2 DIABETES MELLITUS WITH ALBUMINURIA (HCC): ICD-10-CM

## 2023-06-29 DIAGNOSIS — D64.9 ANEMIA, UNSPECIFIED TYPE: ICD-10-CM

## 2023-06-29 DIAGNOSIS — Z12.5 PROSTATE CANCER SCREENING: ICD-10-CM

## 2023-06-29 DIAGNOSIS — E11.59 HYPERTENSION ASSOCIATED WITH DIABETES (HCC): ICD-10-CM

## 2023-06-29 DIAGNOSIS — E78.2 MIXED HYPERLIPIDEMIA: ICD-10-CM

## 2023-06-29 PROBLEM — I26.99 PULMONARY EMBOLISM ASSOCIATED WITH COVID-19 (HCC): Status: RESOLVED | Noted: 2022-01-03 | Resolved: 2023-06-29

## 2023-06-29 PROBLEM — U07.1 PULMONARY EMBOLISM ASSOCIATED WITH COVID-19 (HCC): Status: RESOLVED | Noted: 2022-01-03 | Resolved: 2023-06-29

## 2023-06-29 LAB
HBA1C MFR BLD: 6.8 % (ref ?–5.8)
POCT INT CON NEG: NEGATIVE
POCT INT CON POS: POSITIVE

## 2023-06-29 PROCEDURE — 99214 OFFICE O/P EST MOD 30 MIN: CPT | Performed by: FAMILY MEDICINE

## 2023-06-29 PROCEDURE — 3075F SYST BP GE 130 - 139MM HG: CPT | Performed by: FAMILY MEDICINE

## 2023-06-29 PROCEDURE — 83036 HEMOGLOBIN GLYCOSYLATED A1C: CPT | Performed by: FAMILY MEDICINE

## 2023-06-29 PROCEDURE — 3078F DIAST BP <80 MM HG: CPT | Performed by: FAMILY MEDICINE

## 2023-06-29 ASSESSMENT — FIBROSIS 4 INDEX: FIB4 SCORE: 0.96

## 2023-06-29 NOTE — PROGRESS NOTES
"CHIEF COMPLAINT / REASON FOR VISIT  Jerome Mendoza is a 67 y.o. male that presents today to establish care.    HISTORY OF PRESENT ILLNESS  No particular concerns    Has never had colonoscopy.  Has not noticed any gross blood or melena in stool.  Bowel movements have been regular.  He is not sure why he is anemic.      Hemoglobin A1c today is 6.8    Social History     Tobacco Use    Smoking status: Never    Smokeless tobacco: Never    Tobacco comments:     avoid all tobacco products   Vaping Use    Vaping Use: Never used   Substance Use Topics    Alcohol use: No     Alcohol/week: 0.0 oz    Drug use: No     OBJECTIVE    /72 (BP Location: Left arm, Patient Position: Sitting, BP Cuff Size: Adult)   Pulse 75   Temp 36.2 °C (97.1 °F) (Temporal)   Resp 14   Ht 1.651 m (5' 5\")   Wt 69.9 kg (154 lb)   BMI 25.63 kg/m²      PHYSICAL EXAM  Constitutional: Sitting comfortably, in no acute distress, responds to questions appropriately.  Head: Normocephalic  Eyes:  No conjunctival injection, no scleral icterus, PERRL  Ears: External ear canals clear, TMs pearly grey with visualized bony landmarks and crisp light reflex  Mouth: Oral mucosa moist. Good dentition  Throat: Oropharynx clear without erythema or tonsillar exudates  Neck: No cervical lymphadenopathy  Heart: Regular S1 S2, no murmurs, rub, or gallops  Lungs: Clear to auscultation bilaterally, no wheezes, rales, or rhonchi  Extremities: No lower extremity edema. 2+ symmetric radial pulses  Skin: Warm and dry, no rashes or lesions on face or exposed upper extremities    ASSESSMENT & PLAN  1. Type 2 diabetes mellitus with retinopathy of both eyes, without long-term current use of insulin, macular edema presence unspecified, unspecified retinopathy severity (HCC)  2. 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  After discussion decided to discontinue glimepiride.  We will continue metformin 1000 " g twice daily, Jardiance 25 mg daily, Trulicity 3 mg weekly.  Follow-up visit in 3 months with repeat A1c  - POCT A1C  - Diabetic Monofilament LE Exam  - Comp Metabolic Panel; Future  - HEMOGLOBIN A1C; Future  - MICROALBUMIN CREAT RATIO URINE; Future    3. Dyslipidemia associated with type 2 diabetes mellitus (HCC)  4. Mixed hyperlipidemia  Currently taking pravastatin 20 mg daily.  Last LDL-C 80  - Lipid Profile; Future    5. Hypertension associated with diabetes (HCC)  Marginally well controlled on lisinopril 10 mg daily, ideal goal less than 130/80    6. Anemia, unspecified type  Unexplained anemia, normocytic.  The labs for further evaluation.  He has never had a colonoscopy.  We discussed getting a colonoscopy if he does not have another etiology found  - CBC WITH DIFFERENTIAL; Future  - VIT B12,  FOLIC ACID  - IRON/TOTAL IRON BIND; Future  - VITAMIN D,25 HYDROXY (DEFICIENCY); Future  - HAPTOGLOBIN; Future  - RETICULOCYTES COUNT; Future    7. Prostate cancer screening  - PROSTATE SPECIFIC AG SCREENING; Future

## 2023-07-10 ENCOUNTER — DOCUMENTATION (OUTPATIENT)
Dept: HEALTH INFORMATION MANAGEMENT | Facility: OTHER | Age: 68
End: 2023-07-10
Payer: MEDICARE

## 2023-07-26 ENCOUNTER — TELEPHONE (OUTPATIENT)
Dept: MEDICAL GROUP | Facility: PHYSICIAN GROUP | Age: 68
End: 2023-07-26
Payer: MEDICARE

## 2023-07-26 NOTE — TELEPHONE ENCOUNTER
Jerome called and LVM, returned call. Jerome is wanting a supplemental dental insurance plan. Advised I am not knowledgeable on dental insurance plans, advised Jerome to have a friend/family member that is computer savvy get on the Internet and compare plans. Jerome also expressed thanks for recommendation to Dr. Kingston.

## 2023-09-07 ENCOUNTER — OFFICE VISIT (OUTPATIENT)
Dept: URGENT CARE | Facility: PHYSICIAN GROUP | Age: 68
End: 2023-09-07
Payer: MEDICARE

## 2023-09-07 VITALS
TEMPERATURE: 97.1 F | OXYGEN SATURATION: 96 % | SYSTOLIC BLOOD PRESSURE: 130 MMHG | HEIGHT: 65 IN | BODY MASS INDEX: 25.66 KG/M2 | HEART RATE: 90 BPM | RESPIRATION RATE: 16 BRPM | WEIGHT: 154 LBS | DIASTOLIC BLOOD PRESSURE: 84 MMHG

## 2023-09-07 DIAGNOSIS — J34.89 NASAL CONGESTION WITH RHINORRHEA: ICD-10-CM

## 2023-09-07 DIAGNOSIS — R06.02 SOB (SHORTNESS OF BREATH): ICD-10-CM

## 2023-09-07 DIAGNOSIS — J45.40 MODERATE PERSISTENT ASTHMA WITHOUT COMPLICATION: ICD-10-CM

## 2023-09-07 DIAGNOSIS — R05.1 ACUTE COUGH: ICD-10-CM

## 2023-09-07 DIAGNOSIS — R09.81 NASAL CONGESTION WITH RHINORRHEA: ICD-10-CM

## 2023-09-07 DIAGNOSIS — J45.41 MODERATE PERSISTENT ASTHMA WITH EXACERBATION: ICD-10-CM

## 2023-09-07 PROCEDURE — 3079F DIAST BP 80-89 MM HG: CPT | Performed by: NURSE PRACTITIONER

## 2023-09-07 PROCEDURE — 3075F SYST BP GE 130 - 139MM HG: CPT | Performed by: NURSE PRACTITIONER

## 2023-09-07 PROCEDURE — 99214 OFFICE O/P EST MOD 30 MIN: CPT | Performed by: NURSE PRACTITIONER

## 2023-09-07 RX ORDER — PREDNISONE 20 MG/1
20 TABLET ORAL DAILY
Qty: 5 TABLET | Refills: 0 | Status: SHIPPED | OUTPATIENT
Start: 2023-09-07 | End: 2023-09-12

## 2023-09-07 RX ORDER — ALBUTEROL SULFATE 90 UG/1
2 AEROSOL, METERED RESPIRATORY (INHALATION) EVERY 6 HOURS PRN
Qty: 8.5 G | Refills: 0 | Status: SHIPPED | OUTPATIENT
Start: 2023-09-07 | End: 2023-09-28 | Stop reason: SDUPTHER

## 2023-09-07 RX ORDER — FLUTICASONE PROPIONATE 50 MCG
2 SPRAY, SUSPENSION (ML) NASAL DAILY
Qty: 9.9 ML | Refills: 0 | Status: SHIPPED | OUTPATIENT
Start: 2023-09-07 | End: 2023-09-28 | Stop reason: SDUPTHER

## 2023-09-07 RX ORDER — MONTELUKAST SODIUM 10 MG/1
10 TABLET ORAL DAILY
Qty: 30 TABLET | Refills: 0 | Status: SHIPPED | OUTPATIENT
Start: 2023-09-07 | End: 2023-09-28 | Stop reason: SDUPTHER

## 2023-09-07 ASSESSMENT — ENCOUNTER SYMPTOMS
SINUS PAIN: 0
CARDIOVASCULAR NEGATIVE: 1
SPUTUM PRODUCTION: 0
DIARRHEA: 0
ABDOMINAL PAIN: 0
SHORTNESS OF BREATH: 1
SORE THROAT: 1
VOMITING: 0
NECK PAIN: 0
EYE DISCHARGE: 0
EYE REDNESS: 0
MYALGIAS: 0
HEADACHES: 0
DIZZINESS: 0
CONSTIPATION: 0
COUGH: 1
WHEEZING: 1
FEVER: 0
CHILLS: 0
NAUSEA: 0
WEAKNESS: 0

## 2023-09-07 ASSESSMENT — FIBROSIS 4 INDEX: FIB4 SCORE: 0.96

## 2023-09-07 NOTE — PROGRESS NOTES
Subjective     Jerome Mendoza is a 67 y.o. male who presents with Asthma            Asthma  He complains of cough, shortness of breath and wheezing. There is no sputum production. Associated symptoms include a sore throat. Pertinent negatives include no ear pain, fever, headaches, malaise/fatigue or myalgias. His past medical history is significant for asthma.    has been experiencing nasal congestion, runny nose, postnasal drainage, cough which is making his asthma worse x2 weeks.   has been overusing his albuterol due to bronchospasm, shortness of breath, wheeze.  Denies fever, malaise or body aches.   needs refill of albuterol.  Cough is worse at night as his congestion and postnasal drainage is worse.  Will use Afrin as needed.    PMH:  has a past medical history of Asthma (6/19/2014), Hearing loss of both ears (6/19/2014), Hyperlipidemia (6/19/2014), Lower urinary tract symptoms (LUTS) (11/17/2020), Type II or unspecified type diabetes mellitus without mention of complication, not stated as uncontrolled (6/19/2014), and Wears hearing aid (6/19/2014).  MEDS:   Current Outpatient Medications:     albuterol 108 (90 Base) MCG/ACT Aero Soln inhalation aerosol, Inhale 2 Puffs every 6 hours as needed for Shortness of Breath., Disp: 8.5 g, Rfl: 0    predniSONE (DELTASONE) 20 MG Tab, Take 1 Tablet by mouth every day for 5 days., Disp: 5 Tablet, Rfl: 0    montelukast (SINGULAIR) 10 MG Tab, Take 1 Tablet by mouth every day., Disp: 30 Tablet, Rfl: 0    fluticasone (FLONASE) 50 MCG/ACT nasal spray, Administer 2 Sprays into affected nostril(S) every day., Disp: 9.9 mL, Rfl: 0    tamsulosin (FLOMAX) 0.4 MG capsule, TAKE 1 CAPSULE BY MOUTH ONCE DAILY 30  MINUTES  AFTER  BREAKFAST, Disp: 90 Capsule, Rfl: 0    metFORMIN ER (GLUCOPHAGE XR) 500 MG TABLET SR 24 HR, Take 2 Tablets by mouth 2 times a day., Disp: 360 Tablet, Rfl: 3    omeprazole (PRILOSEC) 20 MG delayed-release capsule, Take 1 capsule by mouth  once daily, Disp: 90 Capsule, Rfl: 3    predniSONE (DELTASONE) 20 MG Tab, Take 40 mg once a day for 5 days, Disp: 10 Tablet, Rfl: 0    albuterol 108 (90 Base) MCG/ACT Aero Soln inhalation aerosol, USE 2 INHALATIONS EVERY 6 HOURS AS NEEDED FOR SHORTNESS OF BREATH, Disp: 3 Each, Rfl: 1    JARDIANCE 25 MG Tab, Take 1 tablet by mouth once daily, Disp: 30 Tablet, Rfl: 0    Dulaglutide (TRULICITY) 3 MG/0.5ML Solution Pen-injector, INJECT 1 SYRINGE SUBCUTANEOUSLY ONCE A WEEK, Disp: 4 mL, Rfl: 3    lisinopril (PRINIVIL) 10 MG Tab, Take 1 Tablet by mouth every day., Disp: 90 Tablet, Rfl: 3    pravastatin (PRAVACHOL) 20 MG Tab, Take 1 Tablet by mouth every evening., Disp: 90 Tablet, Rfl: 3    latanoprost (XALATAN) 0.005 % Solution, INSTILL 1 DROP INTO EACH EYE AT BEDTIME, Disp: 7.5 mL, Rfl: 0    azithromycin (ZITHROMAX) 250 MG Tab, Take 500 mg on first day, then 250 mg daily till finish, Disp: 6 Tablet, Rfl: 0    montelukast (SINGULAIR) 10 MG Tab, Take 1 Tablet by mouth every day., Disp: 30 Tablet, Rfl: 3    Ubiquinol 100 MG Cap, Take 200 mg by mouth every day., Disp: 120 Capsule, Rfl: 3    ondansetron (ZOFRAN ODT) 4 MG TABLET DISPERSIBLE, Take 1 Tablet by mouth every 6 hours as needed for Nausea (give PO if no IV route available)., Disp: 10 Tablet, Rfl: 0    timolol (TIMOPTIC) 0.5 % Solution, Administer 1 Drop into both eyes every day., Disp: , Rfl:     Cholecalciferol (VITAMIN D3) 2000 UNIT Cap, Take 1 Capsule by mouth every day. (Patient not taking: Reported on 6/29/2023), Disp: 90 Capsule, Rfl: 3  ALLERGIES:   Allergies   Allergen Reactions    Penicillin G Swelling     SURGHX:   Past Surgical History:   Procedure Laterality Date    TONSILLECTOMY AND ADENOIDECTOMY      VASECTOMY       SOCHX:  reports that he has never smoked. He has never used smokeless tobacco. He reports that he does not drink alcohol and does not use drugs.  FH: Family history was reviewed, no pertinent findings to report      Review of Systems  "  Constitutional:  Negative for chills, fever and malaise/fatigue.   HENT:  Positive for congestion and sore throat. Negative for ear pain and sinus pain.    Eyes:  Negative for discharge and redness.   Respiratory:  Positive for cough, shortness of breath and wheezing. Negative for sputum production.    Cardiovascular: Negative.    Gastrointestinal:  Negative for abdominal pain, constipation, diarrhea, nausea and vomiting.   Musculoskeletal:  Negative for myalgias and neck pain.   Skin:  Negative for itching and rash.   Neurological:  Negative for dizziness, weakness and headaches.   Endo/Heme/Allergies:  Positive for environmental allergies.   All other systems reviewed and are negative.             Objective     /84 (BP Location: Left arm, Patient Position: Sitting, BP Cuff Size: Adult)   Pulse 90   Temp 36.2 °C (97.1 °F) (Temporal)   Resp 16   Ht 1.651 m (5' 5\")   Wt 69.9 kg (154 lb)   SpO2 96%   BMI 25.63 kg/m²      Physical Exam  Vitals reviewed.   Constitutional:       General: He is awake. He is not in acute distress.     Appearance: Normal appearance. He is well-developed. He is not ill-appearing, toxic-appearing or diaphoretic.   HENT:      Head: Normocephalic.      Right Ear: Ear canal and external ear normal. A middle ear effusion is present.      Left Ear: Ear canal and external ear normal. A middle ear effusion is present.      Nose: Mucosal edema, congestion and rhinorrhea present.      Right Turbinates: Swollen.   Cardiovascular:      Rate and Rhythm: Normal rate.   Pulmonary:      Effort: Pulmonary effort is normal.      Breath sounds: Normal air entry. Wheezing present.   Skin:     General: Skin is warm and dry.   Neurological:      Mental Status: He is alert and oriented to person, place, and time.   Psychiatric:         Attention and Perception: Attention normal.         Mood and Affect: Mood normal.         Speech: Speech normal.         Behavior: Behavior normal. Behavior is " cooperative.                             Assessment & Plan        1. Nasal congestion with rhinorrhea    - montelukast (SINGULAIR) 10 MG Tab; Take 1 Tablet by mouth every day.  Dispense: 30 Tablet; Refill: 0  - fluticasone (FLONASE) 50 MCG/ACT nasal spray; Administer 2 Sprays into affected nostril(S) every day.  Dispense: 9.9 mL; Refill: 0    2. Acute cough      3. SOB (shortness of breath)    - albuterol 108 (90 Base) MCG/ACT Aero Soln inhalation aerosol; Inhale 2 Puffs every 6 hours as needed for Shortness of Breath.  Dispense: 8.5 g; Refill: 0    4. Moderate persistent asthma with exacerbation    - albuterol 108 (90 Base) MCG/ACT Aero Soln inhalation aerosol; Inhale 2 Puffs every 6 hours as needed for Shortness of Breath.  Dispense: 8.5 g; Refill: 0  - predniSONE (DELTASONE) 20 MG Tab; Take 1 Tablet by mouth every day for 5 days.  Dispense: 5 Tablet; Refill: 0  - montelukast (SINGULAIR) 10 MG Tab; Take 1 Tablet by mouth every day.  Dispense: 30 Tablet; Refill: 0    -Discontinue Afrin   -Maintain hydration/water intake  -May use over the counter Ibuprofen/Tylenol as needed for any fever  -May use humidifier/vaporizer at home as needed for dry cough/nasal passages  -Gargle salt water or throat lozenges as needed for throat irritation/dry cough  -Get rest  -May use daily longer acting antihistamine as needed (no decongestant) for any post nasal drainage   -May use saline nasal spray/Flonase as needed to flush any nasal congestion/post nasal drainage   -Monitor for fevers, worse cough, phlegm, shortness of breath, wheeze, chest tightness- need re-evaluation

## 2023-09-08 RX ORDER — ALBUTEROL SULFATE 90 UG/1
AEROSOL, METERED RESPIRATORY (INHALATION)
Qty: 27 G | Refills: 0 | Status: SHIPPED | OUTPATIENT
Start: 2023-09-08 | End: 2023-11-22

## 2023-09-15 ENCOUNTER — TELEPHONE (OUTPATIENT)
Dept: URGENT CARE | Facility: PHYSICIAN GROUP | Age: 68
End: 2023-09-15
Payer: MEDICARE

## 2023-09-15 NOTE — TELEPHONE ENCOUNTER
----- Message from JULIAN Shrsetha sent at 9/15/2023  7:53 AM PDT -----  Regarding: RE: Medication Request  Please have him follow up in primary care or return to urgent care. It has been 7 days since he was seen in urgent care. Especially if his asthma is not controlled.     Desirae  ----- Message -----  From: Bia Carrillo  Sent: 9/14/2023   7:48 PM PDT  To: JULIAN Shrestha  Subject: Medication Request                               Kenneth Merrill,     Patient called today requesting if you can send him the inhaler you had told him that you wanted to prescribe him, patient doesn't remember what the medication is called but he would like the medication to be sent to [facility] on [address].    Thank you!

## 2023-09-15 NOTE — TELEPHONE ENCOUNTER
I did inform the patient to follow up with primary or urgent care, no other questions or concerns.

## 2023-09-18 ENCOUNTER — HOSPITAL ENCOUNTER (OUTPATIENT)
Dept: LAB | Facility: MEDICAL CENTER | Age: 68
End: 2023-09-18
Attending: FAMILY MEDICINE
Payer: MEDICARE

## 2023-09-18 DIAGNOSIS — E11.319 TYPE 2 DIABETES MELLITUS WITH RETINOPATHY OF BOTH EYES, WITHOUT LONG-TERM CURRENT USE OF INSULIN, MACULAR EDEMA PRESENCE UNSPECIFIED, UNSPECIFIED RETINOPATHY SEVERITY (HCC): ICD-10-CM

## 2023-09-18 DIAGNOSIS — E78.2 MIXED HYPERLIPIDEMIA: ICD-10-CM

## 2023-09-18 DIAGNOSIS — Z12.5 PROSTATE CANCER SCREENING: ICD-10-CM

## 2023-09-18 DIAGNOSIS — D64.9 ANEMIA, UNSPECIFIED TYPE: ICD-10-CM

## 2023-09-18 LAB
25(OH)D3 SERPL-MCNC: 21 NG/ML (ref 30–100)
ALBUMIN SERPL BCP-MCNC: 4.1 G/DL (ref 3.2–4.9)
ALBUMIN/GLOB SERPL: 1.6 G/DL
ALP SERPL-CCNC: 50 U/L (ref 30–99)
ALT SERPL-CCNC: 11 U/L (ref 2–50)
ANION GAP SERPL CALC-SCNC: 10 MMOL/L (ref 7–16)
AST SERPL-CCNC: 10 U/L (ref 12–45)
BASOPHILS # BLD AUTO: 1.1 % (ref 0–1.8)
BASOPHILS # BLD: 0.07 K/UL (ref 0–0.12)
BILIRUB SERPL-MCNC: 0.4 MG/DL (ref 0.1–1.5)
BUN SERPL-MCNC: 8 MG/DL (ref 8–22)
CALCIUM ALBUM COR SERPL-MCNC: 9.5 MG/DL (ref 8.5–10.5)
CALCIUM SERPL-MCNC: 9.6 MG/DL (ref 8.5–10.5)
CHLORIDE SERPL-SCNC: 104 MMOL/L (ref 96–112)
CHOLEST SERPL-MCNC: 157 MG/DL (ref 100–199)
CO2 SERPL-SCNC: 24 MMOL/L (ref 20–33)
CREAT SERPL-MCNC: 0.87 MG/DL (ref 0.5–1.4)
CREAT UR-MCNC: 45.58 MG/DL
EOSINOPHIL # BLD AUTO: 0.73 K/UL (ref 0–0.51)
EOSINOPHIL NFR BLD: 11.2 % (ref 0–6.9)
ERYTHROCYTE [DISTWIDTH] IN BLOOD BY AUTOMATED COUNT: 42.5 FL (ref 35.9–50)
EST. AVERAGE GLUCOSE BLD GHB EST-MCNC: 169 MG/DL
FASTING STATUS PATIENT QL REPORTED: NORMAL
FOLATE SERPL-MCNC: 15.5 NG/ML
GFR SERPLBLD CREATININE-BSD FMLA CKD-EPI: 93 ML/MIN/1.73 M 2
GLOBULIN SER CALC-MCNC: 2.5 G/DL (ref 1.9–3.5)
GLUCOSE SERPL-MCNC: 216 MG/DL (ref 65–99)
HBA1C MFR BLD: 7.5 % (ref 4–5.6)
HCT VFR BLD AUTO: 37.5 % (ref 42–52)
HDLC SERPL-MCNC: 41 MG/DL
HGB BLD-MCNC: 12.8 G/DL (ref 14–18)
HGB RETIC QN AUTO: 36.2 PG/CELL (ref 29–35)
IMM GRANULOCYTES # BLD AUTO: 0.02 K/UL (ref 0–0.11)
IMM GRANULOCYTES NFR BLD AUTO: 0.3 % (ref 0–0.9)
IMM RETICS NFR: 10.7 % (ref 2.6–16.1)
IRON SATN MFR SERPL: 37 % (ref 15–55)
IRON SERPL-MCNC: 108 UG/DL (ref 50–180)
LDLC SERPL CALC-MCNC: 78 MG/DL
LYMPHOCYTES # BLD AUTO: 1.28 K/UL (ref 1–4.8)
LYMPHOCYTES NFR BLD: 19.7 % (ref 22–41)
MCH RBC QN AUTO: 32.6 PG (ref 27–33)
MCHC RBC AUTO-ENTMCNC: 34.1 G/DL (ref 32.3–36.5)
MCV RBC AUTO: 95.4 FL (ref 81.4–97.8)
MICROALBUMIN UR-MCNC: 28.6 MG/DL
MICROALBUMIN/CREAT UR: 627 MG/G (ref 0–30)
MONOCYTES # BLD AUTO: 0.42 K/UL (ref 0–0.85)
MONOCYTES NFR BLD AUTO: 6.5 % (ref 0–13.4)
NEUTROPHILS # BLD AUTO: 3.98 K/UL (ref 1.82–7.42)
NEUTROPHILS NFR BLD: 61.2 % (ref 44–72)
NRBC # BLD AUTO: 0 K/UL
NRBC BLD-RTO: 0 /100 WBC (ref 0–0.2)
PLATELET # BLD AUTO: 225 K/UL (ref 164–446)
PMV BLD AUTO: 10 FL (ref 9–12.9)
POTASSIUM SERPL-SCNC: 5 MMOL/L (ref 3.6–5.5)
PROT SERPL-MCNC: 6.6 G/DL (ref 6–8.2)
PSA SERPL-MCNC: 0.9 NG/ML (ref 0–4)
RBC # BLD AUTO: 3.93 M/UL (ref 4.7–6.1)
RETICS # AUTO: 0.07 M/UL (ref 0.04–0.12)
RETICS/RBC NFR: 1.7 % (ref 0.8–2.6)
SODIUM SERPL-SCNC: 138 MMOL/L (ref 135–145)
TIBC SERPL-MCNC: 292 UG/DL (ref 250–450)
TRIGL SERPL-MCNC: 191 MG/DL (ref 0–149)
UIBC SERPL-MCNC: 184 UG/DL (ref 110–370)
VIT B12 SERPL-MCNC: 330 PG/ML (ref 211–911)
WBC # BLD AUTO: 6.5 K/UL (ref 4.8–10.8)

## 2023-09-18 PROCEDURE — 80053 COMPREHEN METABOLIC PANEL: CPT

## 2023-09-18 PROCEDURE — 82607 VITAMIN B-12: CPT

## 2023-09-18 PROCEDURE — 82746 ASSAY OF FOLIC ACID SERUM: CPT

## 2023-09-18 PROCEDURE — 83036 HEMOGLOBIN GLYCOSYLATED A1C: CPT

## 2023-09-18 PROCEDURE — 85025 COMPLETE CBC W/AUTO DIFF WBC: CPT

## 2023-09-18 PROCEDURE — 36415 COLL VENOUS BLD VENIPUNCTURE: CPT

## 2023-09-18 PROCEDURE — 80061 LIPID PANEL: CPT

## 2023-09-18 PROCEDURE — 83010 ASSAY OF HAPTOGLOBIN QUANT: CPT

## 2023-09-18 PROCEDURE — 82043 UR ALBUMIN QUANTITATIVE: CPT

## 2023-09-18 PROCEDURE — 82306 VITAMIN D 25 HYDROXY: CPT

## 2023-09-18 PROCEDURE — 85046 RETICYTE/HGB CONCENTRATE: CPT

## 2023-09-18 PROCEDURE — 82570 ASSAY OF URINE CREATININE: CPT

## 2023-09-18 PROCEDURE — 83550 IRON BINDING TEST: CPT

## 2023-09-18 PROCEDURE — 83540 ASSAY OF IRON: CPT

## 2023-09-18 PROCEDURE — 84153 ASSAY OF PSA TOTAL: CPT

## 2023-09-20 LAB — HAPTOGLOB SERPL-MCNC: 154 MG/DL (ref 30–200)

## 2023-09-21 DIAGNOSIS — R39.9 LOWER URINARY TRACT SYMPTOMS (LUTS): ICD-10-CM

## 2023-09-26 DIAGNOSIS — R39.9 LOWER URINARY TRACT SYMPTOMS (LUTS): ICD-10-CM

## 2023-09-26 RX ORDER — TAMSULOSIN HYDROCHLORIDE 0.4 MG/1
CAPSULE ORAL
Qty: 90 CAPSULE | Refills: 3 | Status: SHIPPED | OUTPATIENT
Start: 2023-09-26

## 2023-09-26 NOTE — TELEPHONE ENCOUNTER
Received request via: Patient    Was the patient seen in the last year in this department? Yes    Does the patient have an active prescription (recently filled or refills available) for medication(s) requested? No    Does the patient have [facility] and need 100 day supply (blood pressure, diabetes and cholesterol meds only)? Medication is not for cholesterol, blood pressure or diabetes

## 2023-09-28 ENCOUNTER — OFFICE VISIT (OUTPATIENT)
Dept: MEDICAL GROUP | Facility: PHYSICIAN GROUP | Age: 68
End: 2023-09-28
Payer: MEDICARE

## 2023-09-28 VITALS
BODY MASS INDEX: 25.99 KG/M2 | RESPIRATION RATE: 14 BRPM | HEIGHT: 65 IN | SYSTOLIC BLOOD PRESSURE: 122 MMHG | WEIGHT: 156 LBS | DIASTOLIC BLOOD PRESSURE: 62 MMHG | OXYGEN SATURATION: 97 % | HEART RATE: 76 BPM | TEMPERATURE: 98 F

## 2023-09-28 DIAGNOSIS — R80.9 TYPE 2 DIABETES MELLITUS WITH ALBUMINURIA (HCC): ICD-10-CM

## 2023-09-28 DIAGNOSIS — J34.89 NASAL CONGESTION WITH RHINORRHEA: ICD-10-CM

## 2023-09-28 DIAGNOSIS — J45.20 MILD INTERMITTENT ASTHMA WITHOUT COMPLICATION: ICD-10-CM

## 2023-09-28 DIAGNOSIS — R09.81 NASAL CONGESTION WITH RHINORRHEA: ICD-10-CM

## 2023-09-28 DIAGNOSIS — E55.9 VITAMIN D DEFICIENCY: ICD-10-CM

## 2023-09-28 DIAGNOSIS — Z12.11 COLON CANCER SCREENING: ICD-10-CM

## 2023-09-28 DIAGNOSIS — E11.29 TYPE 2 DIABETES MELLITUS WITH ALBUMINURIA (HCC): ICD-10-CM

## 2023-09-28 PROCEDURE — 99214 OFFICE O/P EST MOD 30 MIN: CPT | Performed by: FAMILY MEDICINE

## 2023-09-28 PROCEDURE — 3074F SYST BP LT 130 MM HG: CPT | Performed by: FAMILY MEDICINE

## 2023-09-28 PROCEDURE — 3078F DIAST BP <80 MM HG: CPT | Performed by: FAMILY MEDICINE

## 2023-09-28 RX ORDER — FLUTICASONE PROPIONATE 50 MCG
2 SPRAY, SUSPENSION (ML) NASAL DAILY
Qty: 36 ML | Refills: 3 | Status: SHIPPED | OUTPATIENT
Start: 2023-09-28

## 2023-09-28 RX ORDER — GLIMEPIRIDE 1 MG/1
1 TABLET ORAL EVERY MORNING
Qty: 90 TABLET | Refills: 3 | Status: SHIPPED | OUTPATIENT
Start: 2023-09-28

## 2023-09-28 RX ORDER — FLUTICASONE PROPIONATE 50 MCG
2 SPRAY, SUSPENSION (ML) NASAL DAILY
Qty: 16 ML | Refills: 3 | Status: SHIPPED | OUTPATIENT
Start: 2023-09-28 | End: 2023-09-28

## 2023-09-28 RX ORDER — ALBUTEROL SULFATE 90 UG/1
1-2 AEROSOL, METERED RESPIRATORY (INHALATION) EVERY 4 HOURS PRN
Qty: 18 G | Refills: 3 | Status: SHIPPED | OUTPATIENT
Start: 2023-09-28

## 2023-09-28 RX ORDER — MONTELUKAST SODIUM 10 MG/1
10 TABLET ORAL DAILY
Qty: 90 TABLET | Refills: 3 | Status: SHIPPED | OUTPATIENT
Start: 2023-09-28

## 2023-09-28 ASSESSMENT — FIBROSIS 4 INDEX: FIB4 SCORE: 0.92

## 2023-09-28 NOTE — PROGRESS NOTES
"CHIEF COMPLAINT / REASON FOR VISIT  Jerome Mendoza is a 67 y.o. male that presents today for lab follow-up    HISTORY OF PRESENT ILLNESS  Still does not want to do colonoscopy.  Has not seen blood in stool    He stopped taking Jardiance due to cost    OBJECTIVE     /62 (BP Location: Right arm, Patient Position: Sitting, BP Cuff Size: Adult)   Pulse 76   Temp 36.7 °C (98 °F) (Temporal)   Resp 14   Ht 1.651 m (5' 5\")   Wt 70.8 kg (156 lb)   SpO2 97%   BMI 25.96 kg/m²      PHYSICAL EXAM  Constitutional: Sitting comfortably, in no acute distress, responds to questions appropriately.  Skin: Warm and dry, no rashes or lesions on face or exposed upper extremities    ASSESSMENT & PLAN  1. 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 Stopped jardiance due to cost. Will re-initiate glimepiride at 1 mg daily which he previously tolerated without complication. Follow up in 3 months with repeat POC A1c in office  - glimepiride (AMARYL) 1 MG tablet; Take 1 Tablet by mouth every morning.  Dispense: 90 Tablet; Refill: 3    2. Colon cancer screening  Declines colonoscopy but agrees to cologuard.  - COLOGUARD (FIT DNA)    3. Vitamin D deficiency  Untreated, recommend treatment with vitamin D supplement over the counter.    4. Mild intermittent asthma without complication  Chronic, well controlled, seemingly allergic in nature given the correlation with other seasonal allergies.  - montelukast (SINGULAIR) 10 MG Tab; Take 1 Tablet by mouth every day.  Dispense: 90 Tablet; Refill: 3  - albuterol 108 (90 Base) MCG/ACT Aero Soln inhalation aerosol; Inhale 1-2 Puffs every four hours as needed for Shortness of Breath.  Dispense: 18 g; Refill: 3    5. Nasal congestion with rhinorrhea  Chronic allergic rhinitis, well controlled with fluticasone nasal spray and montelukast. Continue current therapy  - montelukast (SINGULAIR) 10 MG Tab; Take 1 Tablet " by mouth every day.  Dispense: 90 Tablet; Refill: 3  - fluticasone (FLONASE) 50 MCG/ACT nasal spray; Administer 2 Sprays into affected nostril(S) every day.  Dispense: 36 mL; Refill: 3         Patient

## 2023-11-07 DIAGNOSIS — E78.2 MIXED HYPERLIPIDEMIA: ICD-10-CM

## 2023-11-08 RX ORDER — PRAVASTATIN SODIUM 20 MG
20 TABLET ORAL EVERY EVENING
Qty: 100 TABLET | Refills: 3 | Status: SHIPPED | OUTPATIENT
Start: 2023-11-08

## 2023-11-22 ENCOUNTER — HOSPITAL ENCOUNTER (EMERGENCY)
Facility: MEDICAL CENTER | Age: 68
End: 2023-11-22
Attending: EMERGENCY MEDICINE
Payer: MEDICARE

## 2023-11-22 VITALS
HEIGHT: 65 IN | RESPIRATION RATE: 18 BRPM | OXYGEN SATURATION: 96 % | TEMPERATURE: 98.5 F | SYSTOLIC BLOOD PRESSURE: 203 MMHG | WEIGHT: 155.2 LBS | BODY MASS INDEX: 25.86 KG/M2 | DIASTOLIC BLOOD PRESSURE: 95 MMHG | HEART RATE: 77 BPM

## 2023-11-22 DIAGNOSIS — H20.052 HYPOPYON OF LEFT EYE: ICD-10-CM

## 2023-11-22 PROCEDURE — 96375 TX/PRO/DX INJ NEW DRUG ADDON: CPT

## 2023-11-22 PROCEDURE — 96374 THER/PROPH/DIAG INJ IV PUSH: CPT

## 2023-11-22 PROCEDURE — 700105 HCHG RX REV CODE 258: Performed by: EMERGENCY MEDICINE

## 2023-11-22 PROCEDURE — 700102 HCHG RX REV CODE 250 W/ 637 OVERRIDE(OP): Performed by: EMERGENCY MEDICINE

## 2023-11-22 PROCEDURE — 700101 HCHG RX REV CODE 250: Performed by: EMERGENCY MEDICINE

## 2023-11-22 PROCEDURE — 700111 HCHG RX REV CODE 636 W/ 250 OVERRIDE (IP): Performed by: EMERGENCY MEDICINE

## 2023-11-22 PROCEDURE — 99285 EMERGENCY DEPT VISIT HI MDM: CPT

## 2023-11-22 PROCEDURE — A9270 NON-COVERED ITEM OR SERVICE: HCPCS | Performed by: EMERGENCY MEDICINE

## 2023-11-22 RX ORDER — SODIUM CHLORIDE, SODIUM LACTATE, POTASSIUM CHLORIDE, CALCIUM CHLORIDE 600; 310; 30; 20 MG/100ML; MG/100ML; MG/100ML; MG/100ML
1000 INJECTION, SOLUTION INTRAVENOUS ONCE
Status: COMPLETED | OUTPATIENT
Start: 2023-11-22 | End: 2023-11-22

## 2023-11-22 RX ORDER — ACETAMINOPHEN 325 MG/1
650 TABLET ORAL ONCE
Status: COMPLETED | OUTPATIENT
Start: 2023-11-22 | End: 2023-11-22

## 2023-11-22 RX ORDER — ONDANSETRON 2 MG/ML
4 INJECTION INTRAMUSCULAR; INTRAVENOUS ONCE
Status: COMPLETED | OUTPATIENT
Start: 2023-11-22 | End: 2023-11-22

## 2023-11-22 RX ORDER — HYDROMORPHONE HYDROCHLORIDE 1 MG/ML
0.5 INJECTION, SOLUTION INTRAMUSCULAR; INTRAVENOUS; SUBCUTANEOUS ONCE
Status: COMPLETED | OUTPATIENT
Start: 2023-11-22 | End: 2023-11-22

## 2023-11-22 RX ORDER — MOXIFLOXACIN 5 MG/ML
1 SOLUTION/ DROPS OPHTHALMIC
COMMUNITY

## 2023-11-22 RX ORDER — ACETAZOLAMIDE 500 MG/5ML
500 INJECTION, POWDER, LYOPHILIZED, FOR SOLUTION INTRAVENOUS ONCE
Status: COMPLETED | OUTPATIENT
Start: 2023-11-22 | End: 2023-11-22

## 2023-11-22 RX ORDER — IBUPROFEN 200 MG
400 TABLET ORAL EVERY 6 HOURS PRN
Status: SHIPPED | COMMUNITY
End: 2024-01-03

## 2023-11-22 RX ORDER — DORZOLAMIDE HYDROCHLORIDE AND TIMOLOL MALEATE 20; 5 MG/ML; MG/ML
1 SOLUTION/ DROPS OPHTHALMIC 2 TIMES DAILY
Qty: 10 ML | Refills: 0 | Status: SHIPPED | OUTPATIENT
Start: 2023-11-22 | End: 2023-12-22

## 2023-11-22 RX ORDER — OXYCODONE HYDROCHLORIDE AND ACETAMINOPHEN 5; 325 MG/1; MG/1
1 TABLET ORAL ONCE
Status: COMPLETED | OUTPATIENT
Start: 2023-11-22 | End: 2023-11-22

## 2023-11-22 RX ORDER — POLYMYXIN B SULFATE AND TRIMETHOPRIM 1; 10000 MG/ML; [USP'U]/ML
1 SOLUTION OPHTHALMIC
COMMUNITY

## 2023-11-22 RX ADMIN — OXYCODONE AND ACETAMINOPHEN 1 TABLET: 5; 325 TABLET ORAL at 15:38

## 2023-11-22 RX ADMIN — SODIUM CHLORIDE, POTASSIUM CHLORIDE, SODIUM LACTATE AND CALCIUM CHLORIDE 1000 ML: 600; 310; 30; 20 INJECTION, SOLUTION INTRAVENOUS at 17:20

## 2023-11-22 RX ADMIN — ONDANSETRON 4 MG: 2 INJECTION INTRAMUSCULAR; INTRAVENOUS at 17:16

## 2023-11-22 RX ADMIN — DEXAMETHASONE SODIUM PHOSPHATE 400 MCG: 10 INJECTION, SOLUTION INTRAMUSCULAR; INTRAVENOUS at 16:25

## 2023-11-22 RX ADMIN — ACETAMINOPHEN 650 MG: 325 TABLET, FILM COATED ORAL at 14:18

## 2023-11-22 RX ADMIN — HYDROMORPHONE HYDROCHLORIDE 0.5 MG: 1 INJECTION, SOLUTION INTRAMUSCULAR; INTRAVENOUS; SUBCUTANEOUS at 17:17

## 2023-11-22 RX ADMIN — VANCOMYCIN HYDROCHLORIDE 1 MG: 500 INJECTION, POWDER, LYOPHILIZED, FOR SOLUTION INTRAVENOUS at 16:25

## 2023-11-22 RX ADMIN — CEFTAZIDIME 2 MG: 1 INJECTION, POWDER, FOR SOLUTION INTRAMUSCULAR; INTRAVENOUS at 16:24

## 2023-11-22 RX ADMIN — ACETAZOLAMIDE 500 MG: 500 INJECTION, POWDER, LYOPHILIZED, FOR SOLUTION INTRAVENOUS at 17:52

## 2023-11-22 ASSESSMENT — PAIN DESCRIPTION - PAIN TYPE: TYPE: ACUTE PAIN

## 2023-11-22 ASSESSMENT — FIBROSIS 4 INDEX: FIB4 SCORE: .938035294241931271

## 2023-11-22 NOTE — ED NOTES
Pharmacy Medication Reconciliation      ~Medication reconciliation updated and complete per patient at bedside & patient home pharmacy   ~Allergies have been verified and updated   ~No oral ABX within the last 30 days  ~Patient home pharmacy :  [facility]      ~Anticoagulants (rivaroxaban, apixaban, edoxaban, dabigatran, warfarin, enoxaparin) taken in the last 14 days? No

## 2023-11-22 NOTE — ED NOTES
"Chief Complaint   Patient presents with    Sent by MD [facility]    Eye Pain     Left eye implant, patient reports it is infected and eye doctor sent to ED for \"injection\"     BP (!) 141/95   Pulse 92   Temp 36.7 °C (98.1 °F) (Temporal)   Resp 18   Ht 1.651 m (5' 5\")   Wt 70.4 kg (155 lb 3.3 oz)   SpO2 95%   BMI 25.83 kg/m²     Ambulatory to triage for above, charge RN aware of patient arrival  "

## 2023-11-22 NOTE — ED NOTES
Pt noted to be hypertensive and when asked if in pain, pt stated his eye was hurting.  ERP updated and orders received.

## 2023-11-22 NOTE — ED PROVIDER NOTES
"CHIEF COMPLAINT  Chief Complaint   Patient presents with    [facility]    [facility]     Left eye implant, patient reports it is infected and eye doctor sent to ED for \"injection\"       LIMITATION TO HISTORY   Select: none    HPI    Jerome Mendoza is a 68 y.o. male who presents to the Emergency Department inning of left eye infection required for injection.  Patient has been seen by Dr. Cortez of [facility] apparently had a procedure done on the left eye and it got infected.  Patient was seen in their office today and will require an intraocular injection but they did not apparently have the medication so the patient was sent to the emergency department for initial evaluation and contact of their office.  The patient presents presents here with the above complaints.  Patient just states that he has significantly diminished vision in the left eye.  Patient denies any fevers chills.    OUTSIDE HISTORIAN(S):  Select: None    EXTERNAL RECORDS REVIEWED  Select: Other patient does have a history of diabetes last hemoglobin A1c was 7.52 months ago in September      PAST MEDICAL HISTORY  Past Medical History:   Diagnosis Date    Asthma 6/19/2014    Hearing loss of both ears 6/19/2014    Hyperlipidemia 6/19/2014    Lower urinary tract symptoms (LUTS) 11/17/2020    Type II or unspecified type diabetes mellitus without mention of complication, not stated as uncontrolled 6/19/2014    Wears hearing aid 6/19/2014     .    SURGICAL HISTORY  Past Surgical History:   Procedure Laterality Date    TONSILLECTOMY AND ADENOIDECTOMY      VASECTOMY           FAMILY HISTORY  Family History   Problem Relation Age of Onset    Asthma Mother     Diabetes Father     Psychiatric Illness Father         dementia    Heart Disease Neg Hx     Stroke Neg Hx     Cancer Neg Hx           SOCIAL HISTORY  Social History     Socioeconomic History    Marital status:      Spouse name: Not on file    Number of " children: Not on file    Years of education: Not on file    Highest education level: Not on file   Occupational History    Not on file   Tobacco Use    Smoking status: Never    Smokeless tobacco: Never    Tobacco comments:     avoid all tobacco products   Vaping Use    Vaping Use: Never used   Substance and Sexual Activity    Alcohol use: No     Alcohol/week: 0.0 oz    Drug use: No    Sexual activity: Yes     Partners: Female   Other Topics Concern    Not on file   Social History Narrative    Not on file     Social Determinants of Health     Financial Resource Strain: Low Risk  (8/3/2022)    Overall Financial Resource Strain (CARDIA)     Difficulty of Paying Living Expenses: Not very hard   Food Insecurity: No Food Insecurity (8/3/2022)    Hunger Vital Sign     Worried About Running Out of Food in the Last Year: Never true     Ran Out of Food in the Last Year: Never true   Transportation Needs: No Transportation Needs (8/3/2022)    PRAPARE - Transportation     Lack of Transportation (Medical): No     Lack of Transportation (Non-Medical): No   Physical Activity: Insufficiently Active (8/3/2022)    Exercise Vital Sign     Days of Exercise per Week: 3 days     Minutes of Exercise per Session: 20 min   Stress: No Stress Concern Present (8/3/2022)    Citizen of [place] [employer] - Occupational Stress Questionnaire     Feeling of Stress : Not at all   Social Connections: Socially Integrated (8/3/2022)    Social Connection and Isolation Panel [NHANES]     Frequency of Communication with Friends and Family: More than three times a week     Frequency of Social Gatherings with Friends and Family: More than three times a week     Attends Adventism Services: More than 4 times per year     Active Member of Clubs or Organizations: Yes     Attends Club or Organization Meetings: 1 to 4 times per year     Marital Status:    Intimate Partner Violence: Not At Risk (8/3/2022)    Humiliation, Afraid, Rape, and Kick  questionnaire     Fear of Current or Ex-Partner: No     Emotionally Abused: No     Physically Abused: No     Sexually Abused: No   Housing Stability: Low Risk  (8/3/2022)    Housing Stability Vital Sign     Unable to Pay for Housing in the Last Year: No     Number of Places Lived in the Last Year: 1     Unstable Housing in the Last Year: No         CURRENT MEDICATIONS  No current facility-administered medications on file prior to encounter.     Current Outpatient Medications on File Prior to Encounter   Medication Sig Dispense Refill    pravastatin (PRAVACHOL) 20 MG Tab TAKE 1 TABLET BY MOUTH ONCE DAILY IN THE EVENING 100 Tablet 3    lisinopril (PRINIVIL) 10 MG Tab Take 1 tablet by mouth once daily 100 Tablet 3    Dulaglutide (TRULICITY) 3 MG/0.5ML Solution Pen-injector INJECT 3 MG (0.5 ML) UNDER THE SKIN ONCE A WEEK 8 mL 3    glimepiride (AMARYL) 1 MG tablet Take 1 Tablet by mouth every morning. 90 Tablet 3    montelukast (SINGULAIR) 10 MG Tab Take 1 Tablet by mouth every day. 90 Tablet 3    albuterol 108 (90 Base) MCG/ACT Aero Soln inhalation aerosol Inhale 1-2 Puffs every four hours as needed for Shortness of Breath. 18 g 3    fluticasone (FLONASE) 50 MCG/ACT nasal spray Administer 2 Sprays into affected nostril(S) every day. 36 mL 3    tamsulosin (FLOMAX) 0.4 MG capsule TAKE 1 CAPSULE BY MOUTH ONCE DAILY 30  MINUTES  AFTER  BREAKFAST 90 Capsule 3    albuterol 108 (90 Base) MCG/ACT Aero Soln inhalation aerosol INHALE 2 PUFFS BY MOUTH EVERY 6 HOURS AS NEEDED FOR SHORTNESS OF BREATH 27 g 0    metFORMIN ER (GLUCOPHAGE XR) 500 MG TABLET SR 24 HR Take 2 Tablets by mouth 2 times a day. 360 Tablet 3    omeprazole (PRILOSEC) 20 MG delayed-release capsule Take 1 capsule by mouth once daily 90 Capsule 3    latanoprost (XALATAN) 0.005 % Solution INSTILL 1 DROP INTO EACH EYE AT BEDTIME 7.5 mL 0    azithromycin (ZITHROMAX) 250 MG Tab Take 500 mg on first day, then 250 mg daily till finish 6 Tablet 0    Ubiquinol 100 MG Cap  "Take 200 mg by mouth every day. 120 Capsule 3    ondansetron (ZOFRAN ODT) 4 MG TABLET DISPERSIBLE Take 1 Tablet by mouth every 6 hours as needed for Nausea (give PO if no IV route available). 10 Tablet 0    timolol (TIMOPTIC) 0.5 % Solution Administer 1 Drop into both eyes every day.      Cholecalciferol (VITAMIN D3) 2000 UNIT Cap Take 1 Capsule by mouth every day. 90 Capsule 3           ALLERGIES  Allergies   Allergen Reactions    Penicillin G Swelling       PHYSICAL EXAM  VITAL SIGNS:BP (!) 177/94   Pulse 80   Temp 36.7 °C (98.1 °F) (Temporal)   Resp 16   Ht 1.651 m (5' 5\")   Wt 70.4 kg (155 lb 3.3 oz)   SpO2 95%   BMI 25.83 kg/m²     Constitutional: Well-developed no acute distress   HENT: Normocephalic, Atraumatic, Bilateral external ears normal.  Eyes: Left eye the patient does have a hypopyon.  There is a purulent drainage coming from the eye itself.  Sclera is injected.  Neck: Supple.  Nontender midline  Cardiovascular: Regular rate and rhythm without murmurs gallops or rubs.   Thorax & Lungs: No respiratory distress. Breathing comfortably. Lungs are clear to auscultation bilaterally, there are no wheezes no rales. Chest wall is nontender.  Abdomen: Soft, non distended, non tender   Skin: Warm, Dry, No erythema,   Back: No tenderness, No CVA tenderness.  Psychiatric: Affect normal, Judgment normal, Mood normal.       DIAGNOSTIC STUDIES / PROCEDURES    LABS        RADIOLOGY      COURSE & MEDICAL DECISION MAKING    ED COURSE:    ED Observation Status? Yes;I am placing the patient in to an observation status due to a diagnostic uncertainty as well as therapeutic intensity. Patient placed in observation status at 12:04 PM 11/22/2023    Observation plan is as follows: Does have a significant eye infection to the left eye.  Reached out to Dr. Reno who requested to have the patient observed in the emergency department until about 334 when they are done in their clinic so they can do an injection in the eye.  " The patient remain on observation until the patient is discharged.    INTERVENTIONS BY ME:  Medications   vancomycin 1 mg/0.1 mL intraocular injection 1 mg (has no administration in time range)   cefTAZidime intraocular 2 mg/0.1 mL (Fortaz Io) intraocular inj 2 mg (has no administration in time range)   dexamethasone 400 mcg/0.1 mL (Decadron) intraocular injection 400 mcg (has no administration in time range)         Rechecks is resting comfortably.  I will sign the patient out to partner for further disposition.          INITIAL ASSESSMENT, COURSE AND PLAN  Care Narrative: Patient presents because of an infection in the eye he does have a hypopyon of the left eye.  I spoke with Dr. Reno of [facility] who is ordered for the antibiotics however, they just cannot get over here until after their clinic which ends at 330 so the patient remain on observation until evaluated by Dr. Reno and treated by Dr. Rush               ADDITIONAL PROBLEM LIST  1.  Recent procedure the left eye  DISPOSITION AND DISCUSSIONS  Will have an injection done in the emergency department most likely be discharged home to follow-up with Dr. Reno    FINAL DIAGNOSIS  1. Hypopyon of left eye        Electronically signed by: Shakir Longoria M.D.,1:25 PM 11/22/23

## 2023-11-22 NOTE — ED NOTES
Rounded on pt. Pt asleep in [facility] with even rise and fall of chest visible. Call light within reach.

## 2023-11-22 NOTE — ED NOTES
[facility] called and advised that they will be here within the next hour. Patient updated on POC, thanked for patience.

## 2023-11-22 NOTE — ED NOTES
Optho called and advised that they will not be here until after 330. Patient updated and aware of POC.  MD advising patient can eat and drink, patient provided with applesauce and crackers.

## 2023-11-23 NOTE — ED NOTES
AVS discussed with pt. MD Acharya at bedside to discuss pain medication with patient- MD recommends tylenol and ibuprofen. Pt verbalizes understanding of f/u instructions and prescriptions. Pt ambulatory with steady gait to lobby for pickup

## 2023-11-23 NOTE — CONSULTS
.Ophthalmology Consult    Chief Complaint:    HPI: Jerome Mendoza is a  68 y.o. 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 Here for tap and inject with intravitreal vancomycin, ceftazidime, and dexamethasone.     POH: No surgery/laser  1)  PMH/PSH:  Past Medical History:   Diagnosis Date    Asthma 6/19/2014    Hearing loss of both ears 6/19/2014    Hyperlipidemia 6/19/2014    Lower urinary tract symptoms (LUTS) 11/17/2020    Type II or unspecified type diabetes mellitus without mention of complication, not stated as uncontrolled 6/19/2014    Wears hearing aid 6/19/2014     Past Surgical History:   Procedure Laterality Date    TONSILLECTOMY AND ADENOIDECTOMY      VASECTOMY       FH: Negative for blindness or glaucoma  SH: -Tobacco, -EtOH, -drugs    GTT: None  MEDS:  No current facility-administered medications on file prior to encounter.     Current Outpatient Medications on File Prior to Encounter   Medication Sig Dispense Refill    polymixin-trimethoprim (POLYTRIM) 22725-1.1 UNIT/ML-% Solution 1 Drop every hour. Apply to affected eye while awake      moxifloxacin (VIGAMOX) 0.5 % Solution 1 Drop every hour. Apply to affected eye while awake      Ascorbic Acid (VITAMIN C PO) Take 1 Tablet by mouth every morning.      Omega-3 Fatty Acids (OMEGA-3 PO) Take 1 Capsule by mouth every morning.      VITAMIN D PO Take 1 Tablet by mouth every morning.      ibuprofen (MOTRIN) 200 MG Tab Take 400 mg by mouth every 6 hours as needed for Mild Pain.      pravastatin (PRAVACHOL) 20 MG Tab TAKE 1 TABLET BY MOUTH ONCE DAILY IN THE EVENING 100 Tablet 3    lisinopril (PRINIVIL) 10 MG Tab Take 1 tablet by mouth once daily 100 Tablet 3    Dulaglutide (TRULICITY) 3 MG/0.5ML Solution Pen-injector INJECT 3 MG (0.5 ML) UNDER THE SKIN ONCE A WEEK 8 mL 3    glimepiride (AMARYL) 1 MG tablet Take 1 Tablet by mouth every morning. 90 Tablet 3    montelukast (SINGULAIR) 10 MG Tab Take 1 Tablet by mouth every day. 90  Tablet 3    albuterol 108 (90 Base) MCG/ACT Aero Soln inhalation aerosol Inhale 1-2 Puffs every four hours as needed for Shortness of Breath. 18 g 3    fluticasone (FLONASE) 50 MCG/ACT nasal spray Administer 2 Sprays into affected nostril(S) every day. 36 mL 3    tamsulosin (FLOMAX) 0.4 MG capsule TAKE 1 CAPSULE BY MOUTH ONCE DAILY 30  MINUTES  AFTER  BREAKFAST 90 Capsule 3    metFORMIN ER (GLUCOPHAGE XR) 500 MG TABLET SR 24 HR Take 2 Tablets by mouth 2 times a day. 360 Tablet 3     ALL:  Allergies   Allergen Reactions    Penicillin G Swelling         Exam:   OD- Right Eye OS- Left Eye   VAsc 20/_ at near  20/_ at near   External WNL WNL   Pupils 4 mm, 2+LR, round, brisk, no rAPD 4 mm, 2+LR, round, brisk, no rAPD   EOM Full, ortho Full, ortho   VF FT3SC FT3SC   IOP  12 24     Bedside penlight exam:   OD- Right Eye OS- Left Eye   Lids/Lashes/Lacrimal WNL Erythema   Conj/Sclera White, quiet 3+ injection   Cornea Clear 1+ edema/ haze   Anterior Chamber Deep, formed Deep, 4+ cell/ fibrin, tube   Iris WNL WNL   Lens PCIOL PCIOL     Dilated Fundus Exam: 2.5% phenylephrine and 1% tropicamide    OD- Right Eye OS- Left Eye   Vitreous Quiet Poor view   Disc No edema Poor view   Macula Flat Poor view   Vessels WNL Poor view   Periphery WNL Poor view     A/P:   #Endophthalmitis, left eye  - S/p tap and inject with vancomycin, ceftazidime, dexamethasone  - Unable to obtain tap via vitreous or AC (likely due to fibrin)  Recommendations:  - Continue topical Moxifloxacin, polytrim q1h  - Follow up in clinic on 11/27    Follow up: Clinic on 11/27 in clinic    Sander Hurley MD  Ophthalmology/ Retina  [facility]    PROCEDURE NOTE FOR INTRAVITREAL VANCOMYCIN, CEFTAZIDIME ,DEXAMETHASONE Left eye;         Procedure Date:          11/22/2023  Patient:                       Jerome Mendoza     The procedure was discussed at length with the patient. The patient was made aware of risks including, but not limited to: (1) the  injection may not work; (2) Vitreous hemorrhage; (3) Retinal detachment; Endophthalmitis.     After proper and informed consent was obtained from the patient, the patient was taken to the minor procedure room and the eye prepped with topical 5% Betadine and anesthetic.      Prior to injection patient was alert and oriented x 3. Subconjunctival Lidocaine injection in both eyes was given for anesthesia.      A lid speculum was placed in the left eye      A vitreous tap was attempted however, was unable to obtain a sample.     Injections were performed Infero-temporally 4 mm from the limbus, and to confirm adequate anesthesia of the injection site. Intravitreal VANCOMYCIN (1mg/0.1mL), CEFTAZIDIME (4mg/ 0.1mL), and DEXAMETHASONE (400mcg/0.1mL) was injected in the left eye. Vancomycin and ceftazidime was also inject subconjunctivally in the superotemporal quadrant. No complications were associated with the procedure. 500mg of IV diamox was given following the procedure.

## 2023-11-23 NOTE — ED NOTES
Assumed care of patient. Medicated per MAR for pain. Pt remains hypertensive >200 systolic. MD Acharya notified. Pt reports some improvement in pain 5/10

## 2023-11-23 NOTE — ED PROVIDER NOTES
Find out to me.    Patient was seen by [facility].    After the procedure patient felt nauseous had pain.    At this point it was decided to put an IV    At this point patient required urgent IV placement.  And therefore we did not wait for the nurse I was able to pay peripheral IV in the left wrist.  It flushed well    At this point the patient was reassessed he is feeling diaphoretic sweaty and pain.  Plan  Liter 1 LR 1 L  0.5 mg IV  Zofran 4 mg IV  Diamox 500 mg IV by ophthalmologist.    The patient has been well his blood pressure been trending up which have noted but this point I do not think the patient has hypertensive emergency allowed to follow-up with his primary care doctor regarding his blood pressure.    This patient was signed out to me waiting for CRI so states they did not did the procedure the patient is uncomfortable we are mainly managing his comfort.  Prescription was ordered as per the ophthalmologist.  Follow-up with them.

## 2023-11-28 ENCOUNTER — HOSPITAL ENCOUNTER (OUTPATIENT)
Facility: MEDICAL CENTER | Age: 68
End: 2023-11-28
Attending: OPHTHALMOLOGY | Admitting: OPHTHALMOLOGY
Payer: MEDICARE

## 2023-11-28 ENCOUNTER — ANESTHESIA EVENT (OUTPATIENT)
Dept: SURGERY | Facility: MEDICAL CENTER | Age: 68
End: 2023-11-28
Payer: MEDICARE

## 2023-11-28 ENCOUNTER — ANESTHESIA (OUTPATIENT)
Dept: SURGERY | Facility: MEDICAL CENTER | Age: 68
End: 2023-11-28
Payer: MEDICARE

## 2023-11-28 VITALS
OXYGEN SATURATION: 98 % | BODY MASS INDEX: 25.01 KG/M2 | DIASTOLIC BLOOD PRESSURE: 80 MMHG | HEART RATE: 82 BPM | HEIGHT: 65 IN | TEMPERATURE: 97.8 F | RESPIRATION RATE: 20 BRPM | SYSTOLIC BLOOD PRESSURE: 170 MMHG | WEIGHT: 150.13 LBS

## 2023-11-28 LAB
ANION GAP SERPL CALC-SCNC: 12 MMOL/L (ref 7–16)
BUN SERPL-MCNC: 13 MG/DL (ref 8–22)
CALCIUM SERPL-MCNC: 8.8 MG/DL (ref 8.5–10.5)
CHLORIDE SERPL-SCNC: 105 MMOL/L (ref 96–112)
CO2 SERPL-SCNC: 21 MMOL/L (ref 20–33)
CREAT SERPL-MCNC: 0.75 MG/DL (ref 0.5–1.4)
GFR SERPLBLD CREATININE-BSD FMLA CKD-EPI: 97 ML/MIN/1.73 M 2
GLUCOSE BLD STRIP.AUTO-MCNC: 124 MG/DL (ref 65–99)
GLUCOSE BLD STRIP.AUTO-MCNC: 160 MG/DL (ref 65–99)
GLUCOSE SERPL-MCNC: 131 MG/DL (ref 65–99)
POTASSIUM SERPL-SCNC: 4.5 MMOL/L (ref 3.6–5.5)
SODIUM SERPL-SCNC: 138 MMOL/L (ref 135–145)

## 2023-11-28 PROCEDURE — 87205 SMEAR GRAM STAIN: CPT

## 2023-11-28 PROCEDURE — 160046 HCHG PACU - 1ST 60 MINS PHASE II: Performed by: OPHTHALMOLOGY

## 2023-11-28 PROCEDURE — 160009 HCHG ANES TIME/MIN: Performed by: OPHTHALMOLOGY

## 2023-11-28 PROCEDURE — 700111 HCHG RX REV CODE 636 W/ 250 OVERRIDE (IP): Mod: JZ | Performed by: STUDENT IN AN ORGANIZED HEALTH CARE EDUCATION/TRAINING PROGRAM

## 2023-11-28 PROCEDURE — 160029 HCHG SURGERY MINUTES - 1ST 30 MINS LEVEL 4: Performed by: OPHTHALMOLOGY

## 2023-11-28 PROCEDURE — 700105 HCHG RX REV CODE 258: Performed by: OPHTHALMOLOGY

## 2023-11-28 PROCEDURE — A9270 NON-COVERED ITEM OR SERVICE: HCPCS | Performed by: STUDENT IN AN ORGANIZED HEALTH CARE EDUCATION/TRAINING PROGRAM

## 2023-11-28 PROCEDURE — 36415 COLL VENOUS BLD VENIPUNCTURE: CPT

## 2023-11-28 PROCEDURE — 87070 CULTURE OTHR SPECIMN AEROBIC: CPT

## 2023-11-28 PROCEDURE — 160035 HCHG PACU - 1ST 60 MINS PHASE I: Performed by: OPHTHALMOLOGY

## 2023-11-28 PROCEDURE — 160002 HCHG RECOVERY MINUTES (STAT): Performed by: OPHTHALMOLOGY

## 2023-11-28 PROCEDURE — 160048 HCHG OR STATISTICAL LEVEL 1-5: Performed by: OPHTHALMOLOGY

## 2023-11-28 PROCEDURE — 700101 HCHG RX REV CODE 250: Performed by: STUDENT IN AN ORGANIZED HEALTH CARE EDUCATION/TRAINING PROGRAM

## 2023-11-28 PROCEDURE — 82962 GLUCOSE BLOOD TEST: CPT

## 2023-11-28 PROCEDURE — 700101 HCHG RX REV CODE 250: Performed by: OPHTHALMOLOGY

## 2023-11-28 PROCEDURE — 700111 HCHG RX REV CODE 636 W/ 250 OVERRIDE (IP): Performed by: OPHTHALMOLOGY

## 2023-11-28 PROCEDURE — 87075 CULTR BACTERIA EXCEPT BLOOD: CPT

## 2023-11-28 PROCEDURE — 700102 HCHG RX REV CODE 250 W/ 637 OVERRIDE(OP): Performed by: STUDENT IN AN ORGANIZED HEALTH CARE EDUCATION/TRAINING PROGRAM

## 2023-11-28 PROCEDURE — 700101 HCHG RX REV CODE 250

## 2023-11-28 PROCEDURE — 93005 ELECTROCARDIOGRAM TRACING: CPT | Performed by: OPHTHALMOLOGY

## 2023-11-28 PROCEDURE — 160041 HCHG SURGERY MINUTES - EA ADDL 1 MIN LEVEL 4: Performed by: OPHTHALMOLOGY

## 2023-11-28 PROCEDURE — 80048 BASIC METABOLIC PNL TOTAL CA: CPT

## 2023-11-28 PROCEDURE — 87077 CULTURE AEROBIC IDENTIFY: CPT | Mod: 91

## 2023-11-28 PROCEDURE — 160025 RECOVERY II MINUTES (STATS): Performed by: OPHTHALMOLOGY

## 2023-11-28 RX ORDER — HYDROMORPHONE HYDROCHLORIDE 1 MG/ML
0.2 INJECTION, SOLUTION INTRAMUSCULAR; INTRAVENOUS; SUBCUTANEOUS
Status: DISCONTINUED | OUTPATIENT
Start: 2023-11-28 | End: 2023-11-28 | Stop reason: HOSPADM

## 2023-11-28 RX ORDER — CYCLOPENTOLATE HYDROCHLORIDE 10 MG/ML
SOLUTION/ DROPS OPHTHALMIC
Status: COMPLETED
Start: 2023-11-28 | End: 2023-11-28

## 2023-11-28 RX ORDER — DIPHENHYDRAMINE HYDROCHLORIDE 50 MG/ML
12.5 INJECTION INTRAMUSCULAR; INTRAVENOUS
Status: DISCONTINUED | OUTPATIENT
Start: 2023-11-28 | End: 2023-11-28 | Stop reason: HOSPADM

## 2023-11-28 RX ORDER — HALOPERIDOL 5 MG/ML
1 INJECTION INTRAMUSCULAR
Status: DISCONTINUED | OUTPATIENT
Start: 2023-11-28 | End: 2023-11-28 | Stop reason: HOSPADM

## 2023-11-28 RX ORDER — DEXAMETHASONE SODIUM PHOSPHATE 4 MG/ML
INJECTION, SOLUTION INTRA-ARTICULAR; INTRALESIONAL; INTRAMUSCULAR; INTRAVENOUS; SOFT TISSUE PRN
Status: DISCONTINUED | OUTPATIENT
Start: 2023-11-28 | End: 2023-11-28 | Stop reason: SURG

## 2023-11-28 RX ORDER — ATROPINE SULFATE 10 MG/ML
SOLUTION/ DROPS OPHTHALMIC
Status: DISCONTINUED
Start: 2023-11-28 | End: 2023-11-28 | Stop reason: HOSPADM

## 2023-11-28 RX ORDER — BALANCED SALT SOLUTION ENRICHED WITH BICARBONATE, DEXTROSE, AND GLUTATHIONE
KIT INTRAOCULAR
Status: DISCONTINUED
Start: 2023-11-28 | End: 2023-11-28 | Stop reason: HOSPADM

## 2023-11-28 RX ORDER — NEOMYCIN SULFATE, POLYMYXIN B SULFATE, AND DEXAMETHASONE 3.5; 10000; 1 MG/G; [USP'U]/G; MG/G
OINTMENT OPHTHALMIC
Status: DISCONTINUED | OUTPATIENT
Start: 2023-11-28 | End: 2023-11-28 | Stop reason: HOSPADM

## 2023-11-28 RX ORDER — CEFAZOLIN SODIUM 1 G/3ML
INJECTION, POWDER, FOR SOLUTION INTRAMUSCULAR; INTRAVENOUS PRN
Status: DISCONTINUED | OUTPATIENT
Start: 2023-11-28 | End: 2023-11-28 | Stop reason: SURG

## 2023-11-28 RX ORDER — SODIUM CHLORIDE, SODIUM LACTATE, POTASSIUM CHLORIDE, CALCIUM CHLORIDE 600; 310; 30; 20 MG/100ML; MG/100ML; MG/100ML; MG/100ML
INJECTION, SOLUTION INTRAVENOUS CONTINUOUS
Status: DISCONTINUED | OUTPATIENT
Start: 2023-11-28 | End: 2023-11-28 | Stop reason: HOSPADM

## 2023-11-28 RX ORDER — DEXTROSE MONOHYDRATE 25 G/50ML
25 INJECTION, SOLUTION INTRAVENOUS
Status: DISCONTINUED | OUTPATIENT
Start: 2023-11-28 | End: 2023-11-28 | Stop reason: HOSPADM

## 2023-11-28 RX ORDER — TROPICAMIDE 10 MG/ML
SOLUTION/ DROPS OPHTHALMIC
Status: COMPLETED
Start: 2023-11-28 | End: 2023-11-28

## 2023-11-28 RX ORDER — PHENYLEPHRINE HYDROCHLORIDE 10 MG/ML
INJECTION, SOLUTION INTRAMUSCULAR; INTRAVENOUS; SUBCUTANEOUS PRN
Status: DISCONTINUED | OUTPATIENT
Start: 2023-11-28 | End: 2023-11-28 | Stop reason: SURG

## 2023-11-28 RX ORDER — LIDOCAINE HYDROCHLORIDE 20 MG/ML
INJECTION, SOLUTION EPIDURAL; INFILTRATION; INTRACAUDAL; PERINEURAL PRN
Status: DISCONTINUED | OUTPATIENT
Start: 2023-11-28 | End: 2023-11-28 | Stop reason: SURG

## 2023-11-28 RX ORDER — TIMOLOL MALEATE 5 MG/ML
SOLUTION/ DROPS OPHTHALMIC
Status: DISCONTINUED | OUTPATIENT
Start: 2023-11-28 | End: 2023-11-28 | Stop reason: HOSPADM

## 2023-11-28 RX ORDER — ONDANSETRON 2 MG/ML
INJECTION INTRAMUSCULAR; INTRAVENOUS PRN
Status: DISCONTINUED | OUTPATIENT
Start: 2023-11-28 | End: 2023-11-28 | Stop reason: SURG

## 2023-11-28 RX ORDER — METOPROLOL TARTRATE 1 MG/ML
1 INJECTION, SOLUTION INTRAVENOUS
Status: DISCONTINUED | OUTPATIENT
Start: 2023-11-28 | End: 2023-11-28 | Stop reason: HOSPADM

## 2023-11-28 RX ORDER — PHENYLEPHRINE HYDROCHLORIDE 25 MG/ML
SOLUTION/ DROPS OPHTHALMIC
Status: COMPLETED
Start: 2023-11-28 | End: 2023-11-28

## 2023-11-28 RX ORDER — LABETALOL HYDROCHLORIDE 5 MG/ML
5 INJECTION, SOLUTION INTRAVENOUS
Status: DISCONTINUED | OUTPATIENT
Start: 2023-11-28 | End: 2023-11-28 | Stop reason: HOSPADM

## 2023-11-28 RX ORDER — HYDRALAZINE HYDROCHLORIDE 20 MG/ML
5 INJECTION INTRAMUSCULAR; INTRAVENOUS
Status: DISCONTINUED | OUTPATIENT
Start: 2023-11-28 | End: 2023-11-28 | Stop reason: HOSPADM

## 2023-11-28 RX ORDER — DEXAMETHASONE SODIUM PHOSPHATE 4 MG/ML
INJECTION, SOLUTION INTRA-ARTICULAR; INTRALESIONAL; INTRAMUSCULAR; INTRAVENOUS; SOFT TISSUE
Status: DISCONTINUED | OUTPATIENT
Start: 2023-11-28 | End: 2023-11-28 | Stop reason: HOSPADM

## 2023-11-28 RX ORDER — FLURBIPROFEN SODIUM 0.3 MG/ML
SOLUTION/ DROPS OPHTHALMIC
Status: COMPLETED
Start: 2023-11-28 | End: 2023-11-28

## 2023-11-28 RX ORDER — HYDROMORPHONE HYDROCHLORIDE 1 MG/ML
0.4 INJECTION, SOLUTION INTRAMUSCULAR; INTRAVENOUS; SUBCUTANEOUS
Status: DISCONTINUED | OUTPATIENT
Start: 2023-11-28 | End: 2023-11-28 | Stop reason: HOSPADM

## 2023-11-28 RX ORDER — EPHEDRINE SULFATE 50 MG/ML
5 INJECTION, SOLUTION INTRAVENOUS
Status: DISCONTINUED | OUTPATIENT
Start: 2023-11-28 | End: 2023-11-28 | Stop reason: HOSPADM

## 2023-11-28 RX ORDER — LIDOCAINE HYDROCHLORIDE 40 MG/ML
SOLUTION TOPICAL PRN
Status: DISCONTINUED | OUTPATIENT
Start: 2023-11-28 | End: 2023-11-28 | Stop reason: SURG

## 2023-11-28 RX ORDER — BALANCED SALT SOLUTION ENRICHED WITH BICARBONATE, DEXTROSE, AND GLUTATHIONE
KIT INTRAOCULAR
Status: DISCONTINUED | OUTPATIENT
Start: 2023-11-28 | End: 2023-11-28 | Stop reason: HOSPADM

## 2023-11-28 RX ORDER — ONDANSETRON 2 MG/ML
4 INJECTION INTRAMUSCULAR; INTRAVENOUS
Status: DISCONTINUED | OUTPATIENT
Start: 2023-11-28 | End: 2023-11-28 | Stop reason: HOSPADM

## 2023-11-28 RX ORDER — LABETALOL HYDROCHLORIDE 5 MG/ML
INJECTION, SOLUTION INTRAVENOUS PRN
Status: DISCONTINUED | OUTPATIENT
Start: 2023-11-28 | End: 2023-11-28 | Stop reason: SURG

## 2023-11-28 RX ORDER — MOXIFLOXACIN 5 MG/ML
SOLUTION/ DROPS OPHTHALMIC
Status: COMPLETED
Start: 2023-11-28 | End: 2023-11-28

## 2023-11-28 RX ORDER — HYDROMORPHONE HYDROCHLORIDE 1 MG/ML
0.1 INJECTION, SOLUTION INTRAMUSCULAR; INTRAVENOUS; SUBCUTANEOUS
Status: DISCONTINUED | OUTPATIENT
Start: 2023-11-28 | End: 2023-11-28 | Stop reason: HOSPADM

## 2023-11-28 RX ORDER — OXYCODONE HCL 5 MG/5 ML
10 SOLUTION, ORAL ORAL
Status: COMPLETED | OUTPATIENT
Start: 2023-11-28 | End: 2023-11-28

## 2023-11-28 RX ORDER — OXYCODONE HCL 5 MG/5 ML
5 SOLUTION, ORAL ORAL
Status: COMPLETED | OUTPATIENT
Start: 2023-11-28 | End: 2023-11-28

## 2023-11-28 RX ORDER — BALANCED SALT SOLUTION 6.4; .75; .48; .3; 3.9; 1.7 MG/ML; MG/ML; MG/ML; MG/ML; MG/ML; MG/ML
SOLUTION OPHTHALMIC
Status: DISCONTINUED | OUTPATIENT
Start: 2023-11-28 | End: 2023-11-28 | Stop reason: HOSPADM

## 2023-11-28 RX ORDER — ATROPINE SULFATE 10 MG/ML
SOLUTION/ DROPS OPHTHALMIC
Status: DISCONTINUED | OUTPATIENT
Start: 2023-11-28 | End: 2023-11-28 | Stop reason: HOSPADM

## 2023-11-28 RX ADMIN — FLURBIPROFEN SODIUM 3 DROP: 0.3 SOLUTION/ DROPS OPHTHALMIC at 17:02

## 2023-11-28 RX ADMIN — DEXAMETHASONE SODIUM PHOSPHATE 8 MG: 4 INJECTION INTRA-ARTICULAR; INTRALESIONAL; INTRAMUSCULAR; INTRAVENOUS; SOFT TISSUE at 18:35

## 2023-11-28 RX ADMIN — SODIUM CHLORIDE, POTASSIUM CHLORIDE, SODIUM LACTATE AND CALCIUM CHLORIDE: 600; 310; 30; 20 INJECTION, SOLUTION INTRAVENOUS at 17:08

## 2023-11-28 RX ADMIN — TROPICAMIDE 3 DROP: 10 SOLUTION/ DROPS OPHTHALMIC at 17:02

## 2023-11-28 RX ADMIN — PROPOFOL 100 MG: 10 INJECTION, EMULSION INTRAVENOUS at 18:33

## 2023-11-28 RX ADMIN — FENTANYL CITRATE 50 MCG: 50 INJECTION, SOLUTION INTRAMUSCULAR; INTRAVENOUS at 18:33

## 2023-11-28 RX ADMIN — ONDANSETRON 4 MG: 2 INJECTION INTRAMUSCULAR; INTRAVENOUS at 18:35

## 2023-11-28 RX ADMIN — FENTANYL CITRATE 50 MCG: 50 INJECTION, SOLUTION INTRAMUSCULAR; INTRAVENOUS at 19:50

## 2023-11-28 RX ADMIN — FENTANYL CITRATE 50 MCG: 50 INJECTION, SOLUTION INTRAMUSCULAR; INTRAVENOUS at 19:44

## 2023-11-28 RX ADMIN — SUGAMMADEX 200 MG: 100 INJECTION, SOLUTION INTRAVENOUS at 19:55

## 2023-11-28 RX ADMIN — LABETALOL HYDROCHLORIDE 5 MG: 5 INJECTION, SOLUTION INTRAVENOUS at 18:48

## 2023-11-28 RX ADMIN — PHENYLEPHRINE HYDROCHLORIDE 3 DROP: 25 SOLUTION/ DROPS OPHTHALMIC at 17:03

## 2023-11-28 RX ADMIN — LIDOCAINE HYDROCHLORIDE 100 MG: 20 INJECTION, SOLUTION EPIDURAL; INFILTRATION; INTRACAUDAL at 18:34

## 2023-11-28 RX ADMIN — CEFAZOLIN 2 G: 1 INJECTION, POWDER, FOR SOLUTION INTRAMUSCULAR; INTRAVENOUS at 19:03

## 2023-11-28 RX ADMIN — CYCLOPENTOLATE HYDROCHLORIDE 3 DROP: 10 SOLUTION OPHTHALMIC at 17:01

## 2023-11-28 RX ADMIN — PHENYLEPHRINE HYDROCHLORIDE 100 MCG: 10 INJECTION INTRAVENOUS at 18:41

## 2023-11-28 RX ADMIN — FENTANYL CITRATE 50 MCG: 50 INJECTION, SOLUTION INTRAMUSCULAR; INTRAVENOUS at 19:05

## 2023-11-28 RX ADMIN — LIDOCAINE HYDROCHLORIDE 4 ML: 40 SOLUTION TOPICAL at 18:35

## 2023-11-28 RX ADMIN — LABETALOL HYDROCHLORIDE 5 MG: 5 INJECTION, SOLUTION INTRAVENOUS at 19:05

## 2023-11-28 RX ADMIN — PHENYLEPHRINE HYDROCHLORIDE 100 MCG: 10 INJECTION INTRAVENOUS at 18:58

## 2023-11-28 RX ADMIN — ROCURONIUM BROMIDE 20 MG: 10 INJECTION, SOLUTION INTRAVENOUS at 19:32

## 2023-11-28 RX ADMIN — MOXIFLOXACIN 3 DROP: 5 SOLUTION/ DROPS OPHTHALMIC at 17:03

## 2023-11-28 RX ADMIN — LABETALOL HYDROCHLORIDE 5 MG: 5 INJECTION, SOLUTION INTRAVENOUS at 19:11

## 2023-11-28 RX ADMIN — OXYCODONE HYDROCHLORIDE 5 MG: 5 SOLUTION ORAL at 20:25

## 2023-11-28 RX ADMIN — LABETALOL HYDROCHLORIDE 5 MG: 5 INJECTION, SOLUTION INTRAVENOUS at 19:32

## 2023-11-28 RX ADMIN — PROPOFOL 50 MG: 10 INJECTION, EMULSION INTRAVENOUS at 18:47

## 2023-11-28 ASSESSMENT — PAIN DESCRIPTION - PAIN TYPE
TYPE: SURGICAL PAIN

## 2023-11-28 ASSESSMENT — PAIN SCALES - GENERAL: PAIN_LEVEL: 6

## 2023-11-28 ASSESSMENT — FIBROSIS 4 INDEX: FIB4 SCORE: .938035294241931271

## 2023-11-29 LAB
EKG IMPRESSION: NORMAL
GRAM STN SPEC: NORMAL
SIGNIFICANT IND 70042: NORMAL
SITE SITE: NORMAL
SOURCE SOURCE: NORMAL

## 2023-11-29 PROCEDURE — 93010 ELECTROCARDIOGRAM REPORT: CPT | Performed by: INTERNAL MEDICINE

## 2023-11-29 NOTE — ANESTHESIA TIME REPORT
Anesthesia Start and Stop Event Times       Date Time Event    11/28/2023 1530 Ready for Procedure     1830 Anesthesia Start     1958 Anesthesia Stop          Responsible Staff  11/28/23      Name Role Begin End    Shakir Bangura M.D. Anesth 1830 1958          Overtime Reason:  no overtime (within assigned shift)    Comments:

## 2023-11-29 NOTE — ANESTHESIA PREPROCEDURE EVALUATION
Case: [id number] Date/Time: 11/28/23 1607    Procedure: VITRECTOMY / ENDOLASER / MEMBRANE PEEL / POSSIBLE GAS OR OIL LEFT EYE    Pre-op diagnosis: ENDOPHTHALMITIS LEFT EYE    Location: [facility] ROOM 24 / SURGERY SAME DAY [facility]    Surgeons: Jalyn Cortez M.D.            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)   (positive) Type 2 diabetes mellitus, without long-term current use of insulin (HCC)       Physical Exam    Airway   Mallampati: II  TM distance: >3 FB  Neck ROM: full       Cardiovascular - normal exam  Rhythm: regular  Rate: normal  (-) murmur     Dental - normal exam           Pulmonary - normal exam  Breath sounds clear to auscultation     Abdominal    Neurological - normal exam                   Anesthesia Plan    ASA 3- EMERGENT   ASA physical status 3 criteria: diabetes - poorly controlled and hypertension - poorly controlledASA physical status emergent criteria: compromised vital organ, limb or tissue, acute deteriorating condition due to infection and acutely contaminated wound or identified infection source    Plan - general       Airway plan will be ETT          Induction: intravenous    Postoperative Plan: Postoperative administration of opioids is intended.    Pertinent diagnostic labs and testing reviewed    Informed Consent:    Anesthetic plan and risks discussed with patient.    Use of blood products discussed with: patient whom consented to blood products.

## 2023-11-29 NOTE — ANESTHESIA POSTPROCEDURE EVALUATION
Patient: Jerome Mendoza    Procedure Summary       Date: 11/28/23 Room / Location: [facility] ROOM 24 / SURGERY SAME DAY [facility]    Anesthesia Start: 1830 Anesthesia Stop: 1958    Procedure: VITRECTOMY, MEMBRANE PEEL, WASHOUT, LENS IMPLANT REMOVAL (Left: Eye) Diagnosis: (ENDOPHTHALMITIS LEFT EYE)    Surgeons: Jalyn Cortez M.D. Responsible Provider: Shakir Bangura M.D.    Anesthesia Type: general ASA Status: 3 - Emergent            Final Anesthesia Type: general  Last vitals  BP   Blood Pressure : (!) 170/80    Temp   37.5 °C (99.5 °F)    Pulse   80   Resp   12    SpO2   94 %      Anesthesia Post Evaluation    Patient location during evaluation: PACU  Patient participation: complete - patient participated  Level of consciousness: awake and alert  Pain score: 6    Airway patency: patent  Anesthetic complications: no  Cardiovascular status: hemodynamically stable  Respiratory status: acceptable  Hydration status: euvolemic    PONV: none          No notable events documented.     Nurse Pain Score: 6 (NPRS)

## 2023-11-29 NOTE — ANESTHESIA PROCEDURE NOTES
Airway    Date/Time: 11/28/2023 6:35 PM    Performed by: Shakir Bangura M.D.  Authorized by: Shakir Bangura M.D.    Location:  OR  Urgency:  Elective  Indications for Airway Management:  Anesthesia      Spontaneous Ventilation: absent    Sedation Level:  Deep  Preoxygenated: Yes    Patient Position:  Sniffing  Final Airway Type:  Endotracheal airway  Final Endotracheal Airway:  ETT  Cuffed: Yes    Technique Used for Successful ETT Placement:  Direct laryngoscopy    Insertion Site:  Oral  Blade Type:  Canales  Laryngoscope Blade/Videolaryngoscope Blade Size:  2  ETT Size (mm):  7.5  Measured from:  Teeth  ETT to Teeth (cm):  22  Placement Verified by: auscultation and capnometry    Cormack-Lehane Classification:  Grade I - full view of glottis  Number of Attempts at Approach:  1  Ventilation Between Attempts:  None  Number of Other Approaches Attempted:  0

## 2023-11-29 NOTE — OP REPORT
OPERATIVE NOTE    DATE: 11/28/2023    SURGEONS:  Jalyn Cortez MD; Sander Hurley MD       PROCEDURES:  A 23-gauge pars plana vitrectomy, anterior chamber washout, intraocular lens removal, injection of intravitreal vancomycin, ceftazidime, and dexamethasone     PREOPERATIVE DIAGNOSIS:  Endophthalmitis, left eye     POSTOPERATIVE DIAGNOSIS:  Same     ANESTHESIA:  General endotracheal.       FLUIDS:  See anesthesia note.       COMPLICATIONS:  None.       DETAILS OF THE PROCEDURE:  The correct eye was marked in the preoperative area.  Patient was taken to the operating room.  General anesthesia induced by anesthesiology and the patient was prepped and draped in the usual sterile ophthalmic fashion. The patient's eye was noted to have severe endophthalmitis with complete hypopyon. A nasal clear corneal incision was made and the infusion cannula was placed into the anterior chamber. An additional clear corneal incision was made superiorly. The anterior chamber was washed out using BSS solution and the vitrector. A thick purulent material was able to be removed from the inferior peripheral cornea and sent to micro for culture and gram stain. A temporal site was marked 3 mm from the limbus and a 23-gauge trocar was used in a beveled fashion to enter the vitreous cavity. The vitrector was inserted; however, was not able to be visualized. At this point, pars plana vitrectomy was deemed not safe to perform. Additional washout of the anterior chamber was performed with removal of significant purulent material noted to be emanating from the vitreous cavity. The artificial lens was visualized and removed from the anterior chamber through the superior corneal incision. The corneal incisions were sutured using 10-0 nylon sutures. Intravitreal injections of vancomycin, ceftazidime, and dexamethasone were injected into the vitreous cavity.   Vancomycin and ceftazidime were also injected subconjunctivally, particularly around  the existing glaucoma drainage device.  The drapes were then removed.  Patient's face was cleaned, ointment applied to the eye.  Eye was patched and shielded. The patient was taken to the recovery room in good condition.  There were no complications.

## 2023-11-29 NOTE — OR NURSING
1956 pt arrived from OR, report received. OPA in place on 4L mask. Dressing to Left eye CDI. No positioning requirements per report.     2018 patient awakens, denies pain and nausea at this time    2019 telephone updates to spouse     2022 patient tolerates oral intake, denies nausea    2025 patient co pain, medicated per EMAR    2045 dc instructions discussed with spouse, questions answered    2100 patient safely dc home at this time, vss, surgical site continues clean/dry/intact, all belongings with patient and accounted (including wedding ring, bilateral hearing aides, cell phone, and all clothing particles) patient escorted out via wheelchair, piv dc tip intact, safely dc to care of spouse

## 2023-12-01 ENCOUNTER — ANESTHESIA EVENT (OUTPATIENT)
Dept: SURGERY | Facility: MEDICAL CENTER | Age: 68
End: 2023-12-01
Payer: MEDICARE

## 2023-12-01 ENCOUNTER — HOSPITAL ENCOUNTER (OUTPATIENT)
Facility: MEDICAL CENTER | Age: 68
End: 2023-12-01
Attending: OPHTHALMOLOGY | Admitting: OPHTHALMOLOGY
Payer: MEDICARE

## 2023-12-01 ENCOUNTER — ANESTHESIA (OUTPATIENT)
Dept: SURGERY | Facility: MEDICAL CENTER | Age: 68
End: 2023-12-01
Payer: MEDICARE

## 2023-12-01 VITALS
TEMPERATURE: 97.9 F | SYSTOLIC BLOOD PRESSURE: 161 MMHG | HEART RATE: 79 BPM | RESPIRATION RATE: 16 BRPM | BODY MASS INDEX: 24.48 KG/M2 | HEIGHT: 66 IN | DIASTOLIC BLOOD PRESSURE: 73 MMHG | OXYGEN SATURATION: 95 % | WEIGHT: 152.34 LBS

## 2023-12-01 LAB
BACTERIA WND AEROBE CULT: NORMAL
GLUCOSE BLD STRIP.AUTO-MCNC: 162 MG/DL (ref 65–99)
GRAM STN SPEC: NORMAL
PATHOLOGY CONSULT NOTE: NORMAL
SIGNIFICANT IND 70042: NORMAL
SITE SITE: NORMAL
SOURCE SOURCE: NORMAL

## 2023-12-01 PROCEDURE — 700102 HCHG RX REV CODE 250 W/ 637 OVERRIDE(OP): Performed by: ANESTHESIOLOGY

## 2023-12-01 PROCEDURE — 160035 HCHG PACU - 1ST 60 MINS PHASE I: Performed by: OPHTHALMOLOGY

## 2023-12-01 PROCEDURE — 160025 RECOVERY II MINUTES (STATS): Performed by: OPHTHALMOLOGY

## 2023-12-01 PROCEDURE — 160009 HCHG ANES TIME/MIN: Performed by: OPHTHALMOLOGY

## 2023-12-01 PROCEDURE — 700111 HCHG RX REV CODE 636 W/ 250 OVERRIDE (IP): Mod: JZ | Performed by: ANESTHESIOLOGY

## 2023-12-01 PROCEDURE — 160048 HCHG OR STATISTICAL LEVEL 1-5: Performed by: OPHTHALMOLOGY

## 2023-12-01 PROCEDURE — 88307 TISSUE EXAM BY PATHOLOGIST: CPT

## 2023-12-01 PROCEDURE — 700101 HCHG RX REV CODE 250: Performed by: OPHTHALMOLOGY

## 2023-12-01 PROCEDURE — 82962 GLUCOSE BLOOD TEST: CPT

## 2023-12-01 PROCEDURE — 700105 HCHG RX REV CODE 258: Performed by: OPHTHALMOLOGY

## 2023-12-01 PROCEDURE — 700111 HCHG RX REV CODE 636 W/ 250 OVERRIDE (IP): Performed by: OPHTHALMOLOGY

## 2023-12-01 PROCEDURE — A9270 NON-COVERED ITEM OR SERVICE: HCPCS | Performed by: ANESTHESIOLOGY

## 2023-12-01 PROCEDURE — 160029 HCHG SURGERY MINUTES - 1ST 30 MINS LEVEL 4: Performed by: OPHTHALMOLOGY

## 2023-12-01 PROCEDURE — 160041 HCHG SURGERY MINUTES - EA ADDL 1 MIN LEVEL 4: Performed by: OPHTHALMOLOGY

## 2023-12-01 PROCEDURE — 160046 HCHG PACU - 1ST 60 MINS PHASE II: Performed by: OPHTHALMOLOGY

## 2023-12-01 PROCEDURE — 160002 HCHG RECOVERY MINUTES (STAT): Performed by: OPHTHALMOLOGY

## 2023-12-01 PROCEDURE — 160047 HCHG PACU  - EA ADDL 30 MINS PHASE II: Performed by: OPHTHALMOLOGY

## 2023-12-01 PROCEDURE — 700101 HCHG RX REV CODE 250: Performed by: ANESTHESIOLOGY

## 2023-12-01 DEVICE — IMPLANT OCULAR CONFORMER LARGE: Type: IMPLANTABLE DEVICE | Site: EYE | Status: FUNCTIONAL

## 2023-12-01 RX ORDER — LIDOCAINE HYDROCHLORIDE AND EPINEPHRINE BITARTRATE 20; .01 MG/ML; MG/ML
INJECTION, SOLUTION SUBCUTANEOUS
Status: DISCONTINUED
Start: 2023-12-01 | End: 2023-12-01 | Stop reason: HOSPADM

## 2023-12-01 RX ORDER — BUPIVACAINE HYDROCHLORIDE 5 MG/ML
INJECTION, SOLUTION EPIDURAL; INTRACAUDAL
Status: DISCONTINUED | OUTPATIENT
Start: 2023-12-01 | End: 2023-12-01 | Stop reason: HOSPADM

## 2023-12-01 RX ORDER — LABETALOL HYDROCHLORIDE 5 MG/ML
5 INJECTION, SOLUTION INTRAVENOUS
Status: DISCONTINUED | OUTPATIENT
Start: 2023-12-01 | End: 2023-12-01 | Stop reason: HOSPADM

## 2023-12-01 RX ORDER — HALOPERIDOL 5 MG/ML
1 INJECTION INTRAMUSCULAR
Status: DISCONTINUED | OUTPATIENT
Start: 2023-12-01 | End: 2023-12-01 | Stop reason: HOSPADM

## 2023-12-01 RX ORDER — DEXAMETHASONE SODIUM PHOSPHATE 4 MG/ML
INJECTION, SOLUTION INTRA-ARTICULAR; INTRALESIONAL; INTRAMUSCULAR; INTRAVENOUS; SOFT TISSUE PRN
Status: DISCONTINUED | OUTPATIENT
Start: 2023-12-01 | End: 2023-12-01 | Stop reason: SURG

## 2023-12-01 RX ORDER — HYDROMORPHONE HYDROCHLORIDE 1 MG/ML
0.2 INJECTION, SOLUTION INTRAMUSCULAR; INTRAVENOUS; SUBCUTANEOUS
Status: DISCONTINUED | OUTPATIENT
Start: 2023-12-01 | End: 2023-12-01 | Stop reason: HOSPADM

## 2023-12-01 RX ORDER — MIDAZOLAM HYDROCHLORIDE 1 MG/ML
1 INJECTION INTRAMUSCULAR; INTRAVENOUS
Status: DISCONTINUED | OUTPATIENT
Start: 2023-12-01 | End: 2023-12-01 | Stop reason: HOSPADM

## 2023-12-01 RX ORDER — ONDANSETRON 2 MG/ML
4 INJECTION INTRAMUSCULAR; INTRAVENOUS
Status: DISCONTINUED | OUTPATIENT
Start: 2023-12-01 | End: 2023-12-01 | Stop reason: HOSPADM

## 2023-12-01 RX ORDER — HYDROMORPHONE HYDROCHLORIDE 1 MG/ML
0.1 INJECTION, SOLUTION INTRAMUSCULAR; INTRAVENOUS; SUBCUTANEOUS
Status: DISCONTINUED | OUTPATIENT
Start: 2023-12-01 | End: 2023-12-01 | Stop reason: HOSPADM

## 2023-12-01 RX ORDER — MEPERIDINE HYDROCHLORIDE 25 MG/ML
12.5 INJECTION INTRAMUSCULAR; INTRAVENOUS; SUBCUTANEOUS
Status: DISCONTINUED | OUTPATIENT
Start: 2023-12-01 | End: 2023-12-01 | Stop reason: HOSPADM

## 2023-12-01 RX ORDER — OXYCODONE HCL 5 MG/5 ML
5 SOLUTION, ORAL ORAL
Status: COMPLETED | OUTPATIENT
Start: 2023-12-01 | End: 2023-12-01

## 2023-12-01 RX ORDER — DIPHENHYDRAMINE HYDROCHLORIDE 50 MG/ML
12.5 INJECTION INTRAMUSCULAR; INTRAVENOUS
Status: DISCONTINUED | OUTPATIENT
Start: 2023-12-01 | End: 2023-12-01 | Stop reason: HOSPADM

## 2023-12-01 RX ORDER — LIDOCAINE HYDROCHLORIDE 20 MG/ML
INJECTION, SOLUTION EPIDURAL; INFILTRATION; INTRACAUDAL; PERINEURAL PRN
Status: DISCONTINUED | OUTPATIENT
Start: 2023-12-01 | End: 2023-12-01 | Stop reason: SURG

## 2023-12-01 RX ORDER — PHENYLEPHRINE HCL IN 0.9% NACL 0.5 MG/5ML
SYRINGE (ML) INTRAVENOUS PRN
Status: DISCONTINUED | OUTPATIENT
Start: 2023-12-01 | End: 2023-12-01 | Stop reason: SURG

## 2023-12-01 RX ORDER — IPRATROPIUM BROMIDE AND ALBUTEROL SULFATE 2.5; .5 MG/3ML; MG/3ML
3 SOLUTION RESPIRATORY (INHALATION)
Status: DISCONTINUED | OUTPATIENT
Start: 2023-12-01 | End: 2023-12-01 | Stop reason: HOSPADM

## 2023-12-01 RX ORDER — OXYCODONE HCL 5 MG/5 ML
10 SOLUTION, ORAL ORAL
Status: COMPLETED | OUTPATIENT
Start: 2023-12-01 | End: 2023-12-01

## 2023-12-01 RX ORDER — EPHEDRINE SULFATE 50 MG/ML
5 INJECTION, SOLUTION INTRAVENOUS
Status: DISCONTINUED | OUTPATIENT
Start: 2023-12-01 | End: 2023-12-01 | Stop reason: HOSPADM

## 2023-12-01 RX ORDER — HYDRALAZINE HYDROCHLORIDE 20 MG/ML
5 INJECTION INTRAMUSCULAR; INTRAVENOUS
Status: DISCONTINUED | OUTPATIENT
Start: 2023-12-01 | End: 2023-12-01 | Stop reason: HOSPADM

## 2023-12-01 RX ORDER — SODIUM CHLORIDE, SODIUM LACTATE, POTASSIUM CHLORIDE, CALCIUM CHLORIDE 600; 310; 30; 20 MG/100ML; MG/100ML; MG/100ML; MG/100ML
INJECTION, SOLUTION INTRAVENOUS CONTINUOUS
Status: DISCONTINUED | OUTPATIENT
Start: 2023-12-01 | End: 2023-12-01 | Stop reason: HOSPADM

## 2023-12-01 RX ORDER — BUPIVACAINE HYDROCHLORIDE 5 MG/ML
INJECTION, SOLUTION EPIDURAL; INTRACAUDAL
Status: DISCONTINUED
Start: 2023-12-01 | End: 2023-12-01 | Stop reason: HOSPADM

## 2023-12-01 RX ORDER — LIDOCAINE HYDROCHLORIDE AND EPINEPHRINE BITARTRATE 20; .01 MG/ML; MG/ML
INJECTION, SOLUTION SUBCUTANEOUS
Status: DISCONTINUED | OUTPATIENT
Start: 2023-12-01 | End: 2023-12-01 | Stop reason: HOSPADM

## 2023-12-01 RX ORDER — GENTAMICIN SULFATE 3 MG/ML
SOLUTION/ DROPS OPHTHALMIC
Status: DISCONTINUED
Start: 2023-12-01 | End: 2023-12-01 | Stop reason: HOSPADM

## 2023-12-01 RX ORDER — GENTAMICIN SULFATE 40 MG/ML
INJECTION, SOLUTION INTRAMUSCULAR; INTRAVENOUS
Status: DISCONTINUED | OUTPATIENT
Start: 2023-12-01 | End: 2023-12-01 | Stop reason: HOSPADM

## 2023-12-01 RX ORDER — ONDANSETRON 2 MG/ML
INJECTION INTRAMUSCULAR; INTRAVENOUS PRN
Status: DISCONTINUED | OUTPATIENT
Start: 2023-12-01 | End: 2023-12-01 | Stop reason: SURG

## 2023-12-01 RX ORDER — CEFAZOLIN SODIUM 1 G/3ML
INJECTION, POWDER, FOR SOLUTION INTRAMUSCULAR; INTRAVENOUS PRN
Status: DISCONTINUED | OUTPATIENT
Start: 2023-12-01 | End: 2023-12-01 | Stop reason: SURG

## 2023-12-01 RX ORDER — SODIUM CHLORIDE, SODIUM LACTATE, POTASSIUM CHLORIDE, CALCIUM CHLORIDE 600; 310; 30; 20 MG/100ML; MG/100ML; MG/100ML; MG/100ML
INJECTION, SOLUTION INTRAVENOUS CONTINUOUS
Status: ACTIVE | OUTPATIENT
Start: 2023-12-01 | End: 2023-12-01

## 2023-12-01 RX ORDER — GENTAMICIN SULFATE 40 MG/ML
INJECTION, SOLUTION INTRAMUSCULAR; INTRAVENOUS
Status: DISCONTINUED
Start: 2023-12-01 | End: 2023-12-01 | Stop reason: HOSPADM

## 2023-12-01 RX ORDER — HYDROMORPHONE HYDROCHLORIDE 1 MG/ML
0.4 INJECTION, SOLUTION INTRAMUSCULAR; INTRAVENOUS; SUBCUTANEOUS
Status: DISCONTINUED | OUTPATIENT
Start: 2023-12-01 | End: 2023-12-01 | Stop reason: HOSPADM

## 2023-12-01 RX ORDER — ERYTHROMYCIN 5 MG/G
OINTMENT OPHTHALMIC
Status: DISCONTINUED
Start: 2023-12-01 | End: 2023-12-01 | Stop reason: HOSPADM

## 2023-12-01 RX ORDER — ERYTHROMYCIN 5 MG/G
OINTMENT OPHTHALMIC
Status: DISCONTINUED | OUTPATIENT
Start: 2023-12-01 | End: 2023-12-01 | Stop reason: HOSPADM

## 2023-12-01 RX ADMIN — SODIUM CHLORIDE, POTASSIUM CHLORIDE, SODIUM LACTATE AND CALCIUM CHLORIDE: 600; 310; 30; 20 INJECTION, SOLUTION INTRAVENOUS at 09:20

## 2023-12-01 RX ADMIN — Medication 100 MCG: at 11:16

## 2023-12-01 RX ADMIN — OXYCODONE HYDROCHLORIDE 5 MG: 5 SOLUTION ORAL at 13:51

## 2023-12-01 RX ADMIN — PROPOFOL 120 MG: 10 INJECTION, EMULSION INTRAVENOUS at 11:03

## 2023-12-01 RX ADMIN — FENTANYL CITRATE 25 MCG: 50 INJECTION, SOLUTION INTRAMUSCULAR; INTRAVENOUS at 13:11

## 2023-12-01 RX ADMIN — HYDRALAZINE HYDROCHLORIDE 5 MG: 20 INJECTION, SOLUTION INTRAMUSCULAR; INTRAVENOUS at 13:25

## 2023-12-01 RX ADMIN — FENTANYL CITRATE 25 MCG: 50 INJECTION, SOLUTION INTRAMUSCULAR; INTRAVENOUS at 13:51

## 2023-12-01 RX ADMIN — OXYCODONE HYDROCHLORIDE 5 MG: 5 SOLUTION ORAL at 13:11

## 2023-12-01 RX ADMIN — Medication 200 MCG: at 12:24

## 2023-12-01 RX ADMIN — LIDOCAINE HYDROCHLORIDE 100 MG: 20 INJECTION, SOLUTION EPIDURAL; INFILTRATION; INTRACAUDAL at 11:03

## 2023-12-01 RX ADMIN — FENTANYL CITRATE 50 MCG: 50 INJECTION, SOLUTION INTRAMUSCULAR; INTRAVENOUS at 11:24

## 2023-12-01 RX ADMIN — FENTANYL CITRATE 50 MCG: 50 INJECTION, SOLUTION INTRAMUSCULAR; INTRAVENOUS at 11:12

## 2023-12-01 RX ADMIN — FENTANYL CITRATE 50 MCG: 50 INJECTION, SOLUTION INTRAMUSCULAR; INTRAVENOUS at 11:34

## 2023-12-01 RX ADMIN — FENTANYL CITRATE 50 MCG: 50 INJECTION, SOLUTION INTRAMUSCULAR; INTRAVENOUS at 11:47

## 2023-12-01 RX ADMIN — ONDANSETRON 4 MG: 2 INJECTION INTRAMUSCULAR; INTRAVENOUS at 12:02

## 2023-12-01 RX ADMIN — FENTANYL CITRATE 50 MCG: 50 INJECTION, SOLUTION INTRAMUSCULAR; INTRAVENOUS at 11:26

## 2023-12-01 RX ADMIN — FENTANYL CITRATE 25 MCG: 50 INJECTION, SOLUTION INTRAMUSCULAR; INTRAVENOUS at 13:16

## 2023-12-01 RX ADMIN — CEFAZOLIN 2 G: 1 INJECTION, POWDER, FOR SOLUTION INTRAMUSCULAR; INTRAVENOUS at 11:03

## 2023-12-01 RX ADMIN — Medication 200 MCG: at 12:09

## 2023-12-01 RX ADMIN — HYDRALAZINE HYDROCHLORIDE 5 MG: 20 INJECTION, SOLUTION INTRAMUSCULAR; INTRAVENOUS at 13:20

## 2023-12-01 RX ADMIN — FENTANYL CITRATE 25 MCG: 50 INJECTION, SOLUTION INTRAMUSCULAR; INTRAVENOUS at 13:46

## 2023-12-01 RX ADMIN — Medication 200 MCG: at 12:19

## 2023-12-01 RX ADMIN — DEXAMETHASONE SODIUM PHOSPHATE 8 MG: 4 INJECTION INTRA-ARTICULAR; INTRALESIONAL; INTRAMUSCULAR; INTRAVENOUS; SOFT TISSUE at 12:02

## 2023-12-01 ASSESSMENT — PAIN DESCRIPTION - PAIN TYPE
TYPE: ACUTE PAIN
TYPE: SURGICAL PAIN

## 2023-12-01 ASSESSMENT — PAIN SCALES - GENERAL: PAIN_LEVEL: 0

## 2023-12-01 ASSESSMENT — FIBROSIS 4 INDEX: FIB4 SCORE: .938035294241931271

## 2023-12-01 NOTE — ANESTHESIA TIME REPORT
Anesthesia Start and Stop Event Times       Date Time Event    12/1/2023 0926 Ready for Procedure     1058 Anesthesia Start     1245 Anesthesia Stop          Responsible Staff  12/01/23      Name Role Begin End    Jesus Allison M.D. Anesth 1058 1245          Overtime Reason:  no overtime (within assigned shift)    Comments:

## 2023-12-01 NOTE — OR NURSING
1242 - Pt to PACU 5 from OR.  Bedside report from anesthesiologist and RN.  Attached to monitoring, VSS, breathing is calm and unlabored. LMA in place, 4L mask. No signs pain or nausea, dressings over eye are clean / dry / intact.    1245 - LMA removed, 4L mask remains in place.     1340 - Pt medicated for reported pain and elevated BP.  Wife updated over phone. Pt now more comfortable, having water and juice.  Room air.     1425 Pt stable to discharge.  Instructions given, patient and spouse verbalize understanding. She is 20 min away and will head down now. Pt is up and dressed and now in recliner. Stated that he feels better.    1440 - IV And armbands removed. VSS, room air. Taken via wheelchair to car.  Pt has all belongings with them including cold pack for home use. Pt voided prior to leaving.

## 2023-12-01 NOTE — ANESTHESIA PREPROCEDURE EVALUATION
Case: [id number] Date/Time: 12/01/23 0945    Procedure: LEFT ENUCLEATION, POSSIBLE IMPLANT, FROST SUTURES    Pre-op diagnosis: BLIND, PAINFUL    Location: [facility] ROOM 24 / SURGERY SAME DAY [facility]    Surgeons: Gustavo Castro M.D.            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)   (positive) Type 2 diabetes mellitus, without long-term current use of insulin (HCC)      Other   (positive) Bilateral hearing loss   (positive) Wears hearing aid       Physical Exam    Airway   Mallampati: II  TM distance: >3 FB  Neck ROM: full       Cardiovascular - normal exam  Rhythm: regular  Rate: normal  (-) murmur     Dental - normal exam  (+) upper dentures           Pulmonary - normal exam  Breath sounds clear to auscultation     Abdominal    Neurological - normal exam                   Anesthesia Plan    ASA 3   ASA physical status 3 criteria: hypertension - poorly controlled    Plan - general       Airway plan will be LMA          Induction: intravenous    Postoperative Plan: Postoperative administration of opioids is intended.    Pertinent diagnostic labs and testing reviewed    Informed Consent:    Anesthetic plan and risks discussed with patient.    Use of blood products discussed with: patient whom consented to blood products.

## 2023-12-01 NOTE — ANESTHESIA PROCEDURE NOTES
Airway    Date/Time: 12/1/2023 11:04 AM    Performed by: Jesus Allison M.D.  Authorized by: Jesus Allison M.D.    Location:  OR  Urgency:  Elective  Difficult Airway: No    Indications for Airway Management:  Anesthesia      Spontaneous Ventilation: absent    Sedation Level:  Deep  Preoxygenated: Yes    Mask Difficulty Assessment:  1 - vent by mask  Final Airway Type:  Supraglottic airway  Final Supraglottic Airway:  Flexible LMA    SGA Size:  4  Number of Attempts at Approach:  1

## 2023-12-01 NOTE — DISCHARGE INSTRUCTIONS
OCULOPLASTICS SURGERY POSTOP INSTRUCTIONS:  - No heavy lifting, bending, stooping, straining, or exercise for 2 weeks following surgery.   - Keep head elevated.  - Keep Left eye dressing clean and dry.  Do NOT remove the dressing.  It will be removed at post-op appt.  If edges come loose, you may reinforce it with more tape.    - Do not take any aspirin or NSAID (e.g., Motrin, ibuprofen, Advil, naproxen, etc) for 1 week after surgery.   - Please take only over-the-counter Tylenol (acetaminophen) as needed for pain.   - Please call Dr. Castro's office with any questions or concerns.  [phone].     Postop Medications: Prescriptions have all been sent to the patient's preferred pharmacy.    - Erythromycin ophthalmic ointment -- hold on to this, it will be used after the dressing is removed at postop appt.   - Keflex (cephalexin) 500 mg orally 2 times per day for one week starting later today.   - Hydrocodone/APAP  5/325 Take one every 6 hours as needed for severe pain. May take over the counter tylenol instead if pain is not severe.     If any questions arise, call your provider.  If your provider is not available, please feel free to call the Surgical Center at [phone].    MEDICATIONS: Resume taking daily medication.  Take prescribed pain medication with food.  If no medication is prescribed, you may take non-aspirin pain medication if needed.  PAIN MEDICATION CAN BE VERY CONSTIPATING.  Take a stool softener or laxative such as senokot, pericolace, or milk of magnesia if needed.    Last pain medication given: Oxycodone given at 1:15 pm. Can Take Hydrocodone / APAP after 7:15 pm.     What to Expect Post Anesthesia    Rest and take it easy for the first 24 hours.  A responsible adult is recommended to remain with you during that time.  It is normal to feel sleepy.  We encourage you to not do anything that requires balance, judgment or coordination.    FOR 24 HOURS DO NOT:  Drive, operate machinery or run  household appliances.  Drink beer or alcoholic beverages.  Make important decisions or sign legal documents.    To avoid nausea, slowly advance diet as tolerated, avoiding spicy or greasy foods for the first day.  Add more substantial food to your diet according to your provider's instructions.  Babies can be fed formula or breast milk as soon as they are hungry.  INCREASE FLUIDS AND FIBER TO AVOID CONSTIPATION.    MILD FLU-LIKE SYMPTOMS ARE NORMAL.  YOU MAY EXPERIENCE GENERALIZED MUSCLE ACHES, THROAT IRRITATION, HEADACHE AND/OR SOME NAUSEA.

## 2023-12-01 NOTE — OR SURGEON
Immediate Post OP Note    PreOp Diagnosis: Endoophthalmitis OS, s/p vitrectomy OS, s/p Glaucoma tube OS      PostOp Diagnosis: Same      Procedure(s):  LEFT ENUCLEATION, IMPLANT, FROST SUTURES - Wound Class: Clean Contaminated    Surgeon(s):  Gustavo Castro M.D.    Anesthesiologist/Type of Anesthesia:  Anesthesiologist: Jesus Allison M.D./General    Surgical Staff:  Circulator: Jami Fleming R.N.  Relief Circulator: Sana Brown R.N.  Scrub Person: Henry Gasca    Specimens removed if any:  ID Type Source Tests Collected by Time Destination   A : LEFT GLOBE Other Other PATHOLOGY SPECIMEN Gustavo Castro M.D. 12/1/2023 12:05 PM        Estimated Blood Loss: <5cc    Findings: Severe endophthalmitis with spillage of purulent material through the tube shunt into the peribulbar space.     Complications: None        12/1/2023 12:43 PM Gustavo Castro M.D.

## 2023-12-01 NOTE — OP REPORT
DATE OF OPERATION:  12/1/2023    ATTENDING SURGEON:  Gustavo Castro MD    ASSISTANT SURGEON:  None.    ANESTHESIA:  General with peribulbar block.    ANESTHESIOLOGIST:  MD Keegan    PREOPERATIVE DIAGNOSIS:     Endstage endophthalmitis OS.  No light perception vision OS.   S/p Vitrectomy OS for endophthalmitis  S/p Glaucoma drainage tube placement OS    POSTOPERATIVE DIAGNOSIS:  Same as preoperative diagnosis.     PROCEDURE PERFORMED:  1.  Enucleation without orbital implant, left eye.  2.  Mccann suture temporary closure of eyelids, left.    SPECIMENS:  left globe.    IMPLANTS: None    COMPLICATIONS:  None.    ESTIMATED BLOOD LOSS:  Less than 5 mL.    INDICATIONS FOR PROCEDURE:  This is a 68 y.o. male with a history of a severe endophthalmitis that failed all other therapy including recent vitrectomy.  The endoophthalmitis was thought to be due to an infection from his prior glaucoma tube shunt. He was urgently referred to my office for enucleation.    After a long discussion of risks, benefits and alternatives, the patient wished to proceed   and informed consent was signed.    PROCEDURE IN DETAIL:  Patient was brought to the operating room and laid   supine on the operating room table after induction of general anesthesia by   the anesthesia service.  A peribulbar block was then placed via transcutaneous   manner using sterile technique using a mixture of 50-50, 2% lidocaine mixed   with 0.25% Marcaine with epinephrine.  The contralateral eye was taped and   shielded.  The left periocular area was then prepped and draped in usual sterile   fashion.  A lid speculum was placed in the eye to retract the eyelids.  A   360-degree peritomy was performed with blunt Mayra scissors.  Blunt   dissection in the oblique quadrants was performed using blunt Mayra scissors   followed by Raymond tenotomy scissors.  Each of the rectus muscles were then   identified in succession using a muscle hook and then they were divided  from its attachment to the globe at its insertion.  Superior laterally there was a Ahmed valve present and this was removed.  Notably, there was purulent fluid emanating from the tube shunt location as likely the intraocular fluid was coming out of the eye via the tube shunt.  This contaminated the orbit.  There was significant scarring and inflammation around the glaucoma shunt.  The inferior oblique was then identified using a muscle hook and cauterized between 2 muscle hooks and then divided.  The superior oblique tendon was identified on a muscle hook and divided off of the globe.  The posterior tenon's attachments to the globe were gently dissected in a 360-degree manner using   Malleable retractors.  The optic nerve was then visualized and   palpated via a medial approach with a small Lashawn hemostat.  The hemostat was   then used to clamp the optic nerve for several minutes.  The clamp was then removed   and enucleation scissors were used to divide the optic nerve posterior to the   globe.  The globe was then dissected free of its remaining posterior attachments to the   orbit and passed off the field as specimen.  The orbit was packed with cotton tipped applicators for hemostasis for several minutes.  An orbital implant was not placed in th orbit given the degree of infection.  The orbit was then irrigated with   Gentamicin irrigation solution. The anterior tenon's layer was then closed over the implant with with multiple interrupted buried 5-0 Vicryl sutures.  The conjunctiva was   closed with a running 6-0 plain gut suture.  A 0.5% plain Marcaine was then   infiltrated into the posterior orbit in all 4 quadrants for postop analgesia   with a total volume infiltrated with 3 mL.  Antibiotic ointment was then   placed on the incision line of the conjunctiva and a large conformer was   placed.  A 4-0 silk suture was then brought through the upper and lower lid   gray line in a horizontal mattress fashion as a  frost suture to close the   eyelids over the conformer.  The drapes were taken down.  The Betadine was   cleansed off of the face and the eye was then pressure patched in the usual   manner.  Patient tolerated the procedure well and was extubated in the   operating room and taken to the PACU in stable condition.

## 2023-12-03 LAB
BACTERIA SPEC ANAEROBE CULT: NORMAL
SIGNIFICANT IND 70042: NORMAL
SITE SITE: NORMAL
SOURCE SOURCE: NORMAL

## 2023-12-11 ENCOUNTER — TELEPHONE (OUTPATIENT)
Dept: MEDICAL GROUP | Facility: PHYSICIAN GROUP | Age: 68
End: 2023-12-11
Payer: MEDICARE

## 2023-12-11 NOTE — TELEPHONE ENCOUNTER
Jerome called because he received a notification that [facility] in 2024 is changing their preferred mail order pharmacy. Jerome receives Trulicity in the mail so he called. His Trulicity is through the , so I placed a new PAP in today's outgoing mail. I instructed Jerome to complete the pt portion of the PAP and drop it off at the . I will fax the completed application once I receive it.

## 2023-12-20 RX ORDER — TAMSULOSIN HYDROCHLORIDE 0.4 MG/1
CAPSULE ORAL
Qty: 90 CAPSULE | Refills: 0 | OUTPATIENT
Start: 2023-12-20

## 2024-01-03 ENCOUNTER — OFFICE VISIT (OUTPATIENT)
Dept: MEDICAL GROUP | Facility: PHYSICIAN GROUP | Age: 69
End: 2024-01-03
Payer: MEDICARE

## 2024-01-03 VITALS
TEMPERATURE: 98.4 F | HEART RATE: 90 BPM | RESPIRATION RATE: 18 BRPM | DIASTOLIC BLOOD PRESSURE: 82 MMHG | BODY MASS INDEX: 25.55 KG/M2 | SYSTOLIC BLOOD PRESSURE: 144 MMHG | OXYGEN SATURATION: 96 % | WEIGHT: 159 LBS | HEIGHT: 66 IN

## 2024-01-03 DIAGNOSIS — E11.319 TYPE 2 DIABETES MELLITUS WITH RETINOPATHY OF BOTH EYES, WITHOUT LONG-TERM CURRENT USE OF INSULIN, MACULAR EDEMA PRESENCE UNSPECIFIED, UNSPECIFIED RETINOPATHY SEVERITY (HCC): ICD-10-CM

## 2024-01-03 DIAGNOSIS — E11.59 HYPERTENSION ASSOCIATED WITH DIABETES (HCC): ICD-10-CM

## 2024-01-03 DIAGNOSIS — I15.2 HYPERTENSION ASSOCIATED WITH DIABETES (HCC): ICD-10-CM

## 2024-01-03 DIAGNOSIS — E11.69 DYSLIPIDEMIA ASSOCIATED WITH TYPE 2 DIABETES MELLITUS (HCC): ICD-10-CM

## 2024-01-03 DIAGNOSIS — R80.9 TYPE 2 DIABETES MELLITUS WITH ALBUMINURIA (HCC): ICD-10-CM

## 2024-01-03 DIAGNOSIS — E11.3293 MILD NONPROLIFERATIVE DIABETIC RETINOPATHY OF BOTH EYES WITHOUT MACULAR EDEMA ASSOCIATED WITH TYPE 2 DIABETES MELLITUS (HCC): ICD-10-CM

## 2024-01-03 DIAGNOSIS — E78.5 DYSLIPIDEMIA ASSOCIATED WITH TYPE 2 DIABETES MELLITUS (HCC): ICD-10-CM

## 2024-01-03 DIAGNOSIS — E11.29 TYPE 2 DIABETES MELLITUS WITH ALBUMINURIA (HCC): ICD-10-CM

## 2024-01-03 PROBLEM — I77.9 DISORDER OF ARTERIES AND ARTERIOLES (HCC): Status: RESOLVED | Noted: 2022-01-03 | Resolved: 2024-01-03

## 2024-01-03 LAB
HBA1C MFR BLD: 7.1 % (ref ?–5.8)
POCT INT CON NEG: NEGATIVE
POCT INT CON POS: POSITIVE

## 2024-01-03 PROCEDURE — 83036 HEMOGLOBIN GLYCOSYLATED A1C: CPT | Performed by: FAMILY MEDICINE

## 2024-01-03 PROCEDURE — 99214 OFFICE O/P EST MOD 30 MIN: CPT | Performed by: FAMILY MEDICINE

## 2024-01-03 PROCEDURE — 3079F DIAST BP 80-89 MM HG: CPT | Performed by: FAMILY MEDICINE

## 2024-01-03 PROCEDURE — 3077F SYST BP >= 140 MM HG: CPT | Performed by: FAMILY MEDICINE

## 2024-01-03 RX ORDER — LISINOPRIL 20 MG/1
20 TABLET ORAL DAILY
Qty: 100 TABLET | Refills: 3 | Status: SHIPPED | OUTPATIENT
Start: 2024-01-03

## 2024-01-03 ASSESSMENT — FIBROSIS 4 INDEX: FIB4 SCORE: .938035294241931271

## 2024-01-03 NOTE — TELEPHONE ENCOUNTER
Called Jerome to inquire about the [facility] 2024 renewal application? Jerome forgot about it, he will try and find the application at home, complete it and drop it off. Advised to let me know if he cannot find the application.

## 2024-01-03 NOTE — PROGRESS NOTES
"CHIEF COMPLAINT / REASON FOR VISIT  Jerome Mendoza is a 68 y.o. male that presents today for diabetes follow up    HISTORY OF PRESENT ILLNESS  Had left eye removed due to endophthalmitis .    OBJECTIVE     BP (!) 144/82 (BP Location: Right arm, Patient Position: Sitting, BP Cuff Size: Adult)   Pulse 90   Temp 36.9 °C (98.4 °F) (Temporal)   Resp 18   Ht 1.676 m (5' 6\")   Wt 72.1 kg (159 lb)   SpO2 96%   BMI 25.66 kg/m²      PHYSICAL EXAM  Constitutional: Sitting comfortably, in no acute distress, responds to questions appropriately  Skin: Warm and dry, no rashes or lesions on face or exposed upper extremities    ASSESSMENT & PLAN  1. 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 Currently taking Trulicity 3 mg weekly, metformin 1000 mg twice daily, glimepiride 1 mg daily. He couldn't afford Jardiance. Given that he is borderline, he will just initiate lifestyle changes and we will recheck his A1c in 3 months in office.  Would consider increasing Trulicity to 4.5 mg at next visit if still uncontrolled.  - POCT  A1C    2. Type 2 diabetes mellitus with retinopathy of both eyes, without long-term current use of insulin, macular edema presence unspecified, unspecified retinopathy severity (HCC)  Chronic, stable, follows with ophthalmology for management of retinopathy    3. Mild nonproliferative diabetic retinopathy of both eyes without macular edema associated with type 2 diabetes mellitus (HCC)  Chronic, stable, follows with ophthalmology for management of retinopathy    4. Hypertension associated with diabetes (HCC)  Chronic, uncontrolled, will increase lisinopril to 20 mg daily.  Goal blood pressure less than 130/80.  Follow-up in 3 months    5. Dyslipidemia associated with type 2 diabetes mellitus (HCC)  Chronic, controlled, continue pravastatin 20 mg daily    Follow up in 3 months, repeat POC A1c in office        "

## 2024-01-22 ENCOUNTER — DOCUMENTATION (OUTPATIENT)
Dept: MEDICAL GROUP | Facility: PHYSICIAN GROUP | Age: 69
End: 2024-01-22
Payer: MEDICARE

## 2024-01-22 NOTE — PROGRESS NOTES
Received Jerome's [employer] 2024 application for Trulicity. Jerome did not sign one of the pages in the application. Placed application in today's outgoing mail for Jerome to sign and return.

## 2024-01-31 ENCOUNTER — DOCUMENTATION (OUTPATIENT)
Dept: MEDICAL GROUP | Facility: PHYSICIAN GROUP | Age: 69
End: 2024-01-31
Payer: MEDICARE

## 2024-01-31 NOTE — PROGRESS NOTES
Received [employer] application from Jerome, reviewed application for all signatures. Faxed application, received confirmation, scanned into [employer].

## 2024-02-08 ENCOUNTER — TELEPHONE (OUTPATIENT)
Dept: MEDICAL GROUP | Facility: PHYSICIAN GROUP | Age: 69
End: 2024-02-08
Payer: MEDICARE

## 2024-02-12 NOTE — TELEPHONE ENCOUNTER
Jerome called to report his Trulicity coverage through PAP was denied, he was unable to tell me why it was denied. Advised he call [employer] and speak with them- gave Jerome their number. He said he would call them and let me know if we needed to do anything further on our end.    Jerome also had questions regarding dental insurance coverage. Advised he needs to speak with [facility]. Jerome expressed he would like a more comprehensive insurance? Advised to contact the Medicare Store- gave Jerome their phone number to discuss insurance options. No other concerns or questions.

## 2024-02-13 NOTE — DISCHARGE INSTRUCTIONS
RETINAL DETACHMENT OR VITRECTOMY INSTRUCTIONS    These instructions are provided so that you can have the best chance for a successful recovery from your recent eye surgery. In general, they will remain in effect for at least two to three weeks following your operation.   Please call my office to see me within one week following your discharge from the hospital. You can make this appointment by calling my office. Call Monday through Friday from 8:00 am to 5:00 pm. For patients who live a great distance from my office, I may ask you to make arrangements with your local ophthalmologist for follow up care instead of returning here.     Medications:     There medications are to be used while you are awake. A good night's sleep is an important part of the healing process.     1.   Tobradex, one drop in the operated eye every 4 hours.     2.   Tylenol should be sufficient for any pain; if not, please call my office.     3.   Other medications as directed.     Eye Care:    After leaving the hospital, a patch over the operated eye is considered optional. If a patch is worn at home, it should be changed at least once daily. These patches can usually be purchased at your local drug store.   If the eyelids become stuck together because of discharge, gently wash them with a clean damp washcloth moistened with warm tap water. It is advisable to wash under the eye with a damp washcloth at least once a day; be careful not to touch or press on the eye itself.     Appearance and sensation in the operated eye:    1.   It is normal for the operated eye to remain somewhat red, swollen and slightly irritated for four to six weeks.     2.   It is expected that there will be an increased amount of tearing and mattering in the operated eye.     Return of eyesight:     1.   You final best vision will not be known until the eye and retina are completely healed, which takes at least two to three months and sometimes much longer.     2.    Your suprapubic catheter was replaced in the emergency department today.  Please follow-up with them as they instructed you.   Following this type of surgery, you may require a change in the prescription for your glasses or contact lenses. In general, checking for a new prescription is not done until at least two to three months following your surgery.     Physical Activities:    Things to Avoid:    1.   Aspirin  2.   Lifting or moving heavy objects (20 pounds or more)  3.   Rubbing the operated eye.   4.   Strenuous or jarring physical exertion such as running, jumping, aerobics and swimming.   5.   Driving a car.  6.   Garden or yard work.  7.   Wearing of contact lenses in the operated eye for 4-6 weeks.  8.   Returning to work or school; depending upon the nature of eye surgery and occupation, I will advise you to refrain from returning to school or work for anywhere from 2-8 weeks.   9.   Air travel unless otherwise instructed.   10. Reading unless otherwise instructed.     Permissible Activities:    1.   Watching television and using your eyes in moderation.   2.   Cooking and light housework.   3.   Riding in a car on paved roads.  4.   Showering, bathing and shaving; however, avoid getting water in your eyes.     Indications for calling my office:     1.   Increased swelling or redness of the eyelids.  2.   Increased persistent pain in the eye which is not relieved by Tylenol or which does not go away within 24 hours.   3.   Decreased vision.     What to Expect Post Anesthesia    Rest and take it easy for the first 24 hours.  A responsible adult is recommended to remain with you during that time.  It is normal to feel sleepy.  We encourage you to not do anything that requires balance, judgment or coordination.    FOR 24 HOURS DO NOT:  Drive, operate machinery or run household appliances.  Drink beer or alcoholic beverages.  Make important decisions or sign legal documents.    To avoid nausea, slowly advance diet as tolerated, avoiding spicy or greasy foods for the first day.  Add more substantial food to your diet according to your  provider's instructions. INCREASE FLUIDS AND FIBER TO AVOID CONSTIPATION.    MILD FLU-LIKE SYMPTOMS ARE NORMAL.  YOU MAY EXPERIENCE GENERALIZED MUSCLE ACHES, THROAT IRRITATION, HEADACHE AND/OR SOME NAUSEA.    If any questions arise, call your provider.  If your provider is not available, please feel free to call the Surgical Center at [phone].    MEDICATIONS: Resume taking daily medication.  Take prescribed pain medication with food.  If no medication is prescribed, you may take non-aspirin pain medication if needed.  PAIN MEDICATION CAN BE VERY CONSTIPATING.  Take a stool softener or laxative such as senokot, pericolace, or milk of magnesia if needed.    Last pain medication given at _OXY @ 8:15 PM_

## 2024-02-20 ENCOUNTER — TELEPHONE (OUTPATIENT)
Dept: MEDICAL GROUP | Facility: PHYSICIAN GROUP | Age: 69
End: 2024-02-20
Payer: MEDICARE

## 2024-02-21 NOTE — TELEPHONE ENCOUNTER
Caller Name: Jerome Mendoza    Call Back Number: [phone] (home)       How would the patient prefer to be contacted with a response: Phone call OK to leave a detailed message    Patient is calling looking to get a prescription for prednisone.  He states that inhalers are not working and that he is having issues regarding his breathing.  Patient has been prescribed prednisone in the past and which has helped

## 2024-02-21 NOTE — TELEPHONE ENCOUNTER
Phone Number Called: [phone] (home)       Call outcome: Spoke to patient regarding message below.    Message: Spoke to the patient regarding the message below.  Expressed available options for next opening.  Due to no available appointments patient would go to urgent care for evaluation.

## 2024-02-23 ENCOUNTER — TELEPHONE (OUTPATIENT)
Dept: MEDICAL GROUP | Facility: PHYSICIAN GROUP | Age: 69
End: 2024-02-23
Payer: MEDICARE

## 2024-02-23 NOTE — TELEPHONE ENCOUNTER
"Jerome called wanting assistance with completing a [employer] appeal. Jerome was receiving Trulicity form [employer] during 2023. In his 2024 application Jerome didn't understand the question below that he marked as \"yes\" and his 2024 application was denied.     They sent Jerome an appeal form, he is bringing it into the clinic today so we can discuss the process.                           "

## 2024-02-28 NOTE — TELEPHONE ENCOUNTER
Spoke with Tamia MAYEN in pharmacy regarding PAP. Faxed appeal to [employer]. Received confirmation fax, scanned into [employer].

## 2024-03-13 ENCOUNTER — OFFICE VISIT (OUTPATIENT)
Dept: MEDICAL GROUP | Facility: PHYSICIAN GROUP | Age: 69
End: 2024-03-13
Payer: MEDICARE

## 2024-03-13 VITALS
HEIGHT: 66 IN | RESPIRATION RATE: 20 BRPM | OXYGEN SATURATION: 96 % | DIASTOLIC BLOOD PRESSURE: 62 MMHG | SYSTOLIC BLOOD PRESSURE: 110 MMHG | HEART RATE: 67 BPM | TEMPERATURE: 97.3 F | WEIGHT: 161 LBS | BODY MASS INDEX: 25.88 KG/M2

## 2024-03-13 DIAGNOSIS — J45.40 MODERATE PERSISTENT ASTHMA WITHOUT COMPLICATION: ICD-10-CM

## 2024-03-13 DIAGNOSIS — I73.9 PERIPHERAL VASCULAR DISEASE, UNSPECIFIED (HCC): ICD-10-CM

## 2024-03-13 DIAGNOSIS — E11.319 TYPE 2 DIABETES MELLITUS WITH RETINOPATHY OF BOTH EYES, WITHOUT LONG-TERM CURRENT USE OF INSULIN, MACULAR EDEMA PRESENCE UNSPECIFIED, UNSPECIFIED RETINOPATHY SEVERITY (HCC): ICD-10-CM

## 2024-03-13 DIAGNOSIS — J45.901 MILD ASTHMA WITH EXACERBATION, UNSPECIFIED WHETHER PERSISTENT: ICD-10-CM

## 2024-03-13 PROCEDURE — 3078F DIAST BP <80 MM HG: CPT

## 2024-03-13 PROCEDURE — 3074F SYST BP LT 130 MM HG: CPT

## 2024-03-13 PROCEDURE — 99214 OFFICE O/P EST MOD 30 MIN: CPT

## 2024-03-13 RX ORDER — PREDNISONE 20 MG/1
20 TABLET ORAL DAILY
Qty: 5 TABLET | Refills: 0 | Status: SHIPPED | OUTPATIENT
Start: 2024-03-13 | End: 2024-03-18

## 2024-03-13 ASSESSMENT — PATIENT HEALTH QUESTIONNAIRE - PHQ9: CLINICAL INTERPRETATION OF PHQ2 SCORE: 0

## 2024-03-13 ASSESSMENT — FIBROSIS 4 INDEX: FIB4 SCORE: .938035294241931271

## 2024-03-13 NOTE — PROGRESS NOTES
"Subjective:     CC: Diagnoses of Moderate persistent asthma without complication, Peripheral vascular disease, unspecified (HCC), Mild asthma with exacerbation, unspecified whether persistent, and Type 2 diabetes mellitus with retinopathy of both eyes, without long-term current use of insulin, macular edema presence unspecified, unspecified retinopathy severity (HCC) were pertinent to this visit.    HPI:   Jerome presents today with    Problem   Peripheral Vascular Disease, Unspecified (Hcc)   Type 2 Diabetes Mellitus, Without Long-Term Current Use of Insulin (Hcc)   Asthma     ROS:  Review of Systems   All other systems reviewed and are negative.      Objective:     Exam:  /62 (BP Location: Right arm, Patient Position: Sitting, BP Cuff Size: Adult)   Pulse 67   Temp 36.3 °C (97.3 °F) (Temporal)   Resp 20   Ht 1.676 m (5' 6\")   Wt 73 kg (161 lb)   SpO2 96%   BMI 25.99 kg/m²  Body mass index is 25.99 kg/m².    Physical Exam  Vitals reviewed.   Constitutional:       General: He is not in acute distress.     Appearance: Normal appearance. He is not ill-appearing.   HENT:      Head: Normocephalic and atraumatic.   Cardiovascular:      Rate and Rhythm: Normal rate and regular rhythm.      Pulses: Normal pulses.      Heart sounds: Murmur heard.   Pulmonary:      Effort: Pulmonary effort is normal. No respiratory distress.      Breath sounds: Wheezing present.   Skin:     General: Skin is warm and dry.      Findings: No rash.   Neurological:      General: No focal deficit present.      Mental Status: He is alert and oriented to person, place, and time.   Psychiatric:         Mood and Affect: Mood normal.         Behavior: Behavior normal.       Assessment & Plan:     68 y.o. male with the following -     Problem List Items Addressed This Visit       Asthma     Chronic, recent flare with URI. Has been using albuterol, mucinex, montelukast daily, fluticasone nasal spray. Continues to have cough and increased " mucous production 3 weeks   Will provide oral steroid burst         Relevant Medications    predniSONE (DELTASONE) 20 MG Tab    Type 2 diabetes mellitus, without long-term current use of insulin ([facility])     7.1% A1c 1/3/24  Continue glimepiride 1 mg daily, metformin xr 1000 mg BID, dulaglutide 3 mg weekly    Consider resuming sglt-2, has stopped Jardiance due to cost         Peripheral vascular disease, unspecified (HCC)     Other Visit Diagnoses       Mild asthma with exacerbation, unspecified whether persistent        Relevant Medications    predniSONE (DELTASONE) 20 MG Tab          HCC Gap Form    Diagnosis to address: I73.9 - Peripheral vascular disease, unspecified (HCC)  Assessment and plan: Chronic, stable. Continue with current defined treatment plan: continue pravastatin 20 mg daily. Follow-up at least annually.  Last edited 03/13/24 15:24 PDT by MARNIE [email or web address].           Patient was educated in proper administration of medication(s) ordered today including safety, possible SE, risks, benefits, rationale and alternatives to therapy.   Supportive care, differential diagnoses, and indications for immediate follow-up discussed with patient.    Pathogenesis of diagnosis discussed including typical length and natural progression.    Instructed to return to clinic or nearest emergency department for any change in condition, further concerns, or worsening of symptoms.  Patient states understanding of the plan of care and discharge instructions.    Return if symptoms worsen or fail to improve.    Please note that this dictation was created using voice recognition software. I have made every reasonable attempt to correct obvious errors, but I expect that there are errors of grammar and possibly content that I did not discover before finalizing the note.

## 2024-03-13 NOTE — ASSESSMENT & PLAN NOTE
Chronic, recent flare with URI. Has been using albuterol, mucinex, montelukast daily, fluticasone nasal spray. Continues to have cough and increased mucous production 3 weeks   Will provide oral steroid burst

## 2024-03-13 NOTE — ASSESSMENT & PLAN NOTE
7.1% A1c 1/3/24  Continue glimepiride 1 mg daily, metformin xr 1000 mg BID, dulaglutide 3 mg weekly    Consider resuming sglt-2, has stopped Jardiance due to cost

## 2024-03-27 DIAGNOSIS — J45.20 MILD INTERMITTENT ASTHMA WITHOUT COMPLICATION: ICD-10-CM

## 2024-03-27 NOTE — TELEPHONE ENCOUNTER
Received request via: Patient    Was the patient seen in the last year in this department? Yes    Does the patient have an active prescription (recently filled or refills available) for medication(s) requested? No    Pharmacy Name: [facility]    Does the patient have USP Plus and need 100 day supply (blood pressure, diabetes and cholesterol meds only)? Medication is not for cholesterol, blood pressure or diabetes

## 2024-03-27 NOTE — TELEPHONE ENCOUNTER
VOICEMAIL  1. Caller Name: Jerome Mendoza                        Call Back Number: [phone] (home)       2. Message: Patient is calling looking to get a refill on his albuterol.  Patient reports that he went to the pharmacy today and they stated that there is no more refills.    3. Patient approves office to leave a detailed voicemail/MyChart message: yes

## 2024-03-27 NOTE — TELEPHONE ENCOUNTER
Received request via: Pharmacy    Was the patient seen in the last year in this department? Yes    Does the patient have an active prescription (recently filled or refills available) for medication(s) requested? No    Pharmacy Name: [facility]    Does the patient have FPC Plus and need 100 day supply (blood pressure, diabetes and cholesterol meds only)? Medication is not for cholesterol, blood pressure or diabetes

## 2024-03-28 RX ORDER — ALBUTEROL SULFATE 90 UG/1
1-2 AEROSOL, METERED RESPIRATORY (INHALATION) EVERY 4 HOURS PRN
Qty: 18 G | Refills: 11 | Status: SHIPPED | OUTPATIENT
Start: 2024-03-28

## 2024-03-28 NOTE — TELEPHONE ENCOUNTER
Phone Number Called: [phone] (home)       Call outcome: Spoke to patient regarding message below.    Message: pt notified.

## 2024-03-28 NOTE — TELEPHONE ENCOUNTER
VOICEMAIL  1. Caller Name: Jerome Mendoza                        Call Back Number: [phone] (home)       2. Message: Would like to get a update on his inhaler medication.    3. Patient approves office to leave a detailed voicemail/MyChart message: no

## 2024-03-28 NOTE — TELEPHONE ENCOUNTER
Phone Number Called: [phone] (home)       Call outcome: Spoke to patient regarding message below.    Message: Informed the patient that the provider was has been out of office.  Mention to him that the request has been resent to his in basket.  Patient reports that he will check back in a few hours to see if there is an update.

## 2024-03-29 RX ORDER — ALBUTEROL SULFATE 90 UG/1
1-2 AEROSOL, METERED RESPIRATORY (INHALATION) EVERY 4 HOURS PRN
Qty: 18 G | Refills: 0 | OUTPATIENT
Start: 2024-03-29

## 2024-04-04 ENCOUNTER — OFFICE VISIT (OUTPATIENT)
Dept: MEDICAL GROUP | Facility: PHYSICIAN GROUP | Age: 69
End: 2024-04-04
Payer: MEDICARE

## 2024-04-04 ENCOUNTER — TELEPHONE (OUTPATIENT)
Dept: HEALTH INFORMATION MANAGEMENT | Facility: OTHER | Age: 69
End: 2024-04-04

## 2024-04-04 VITALS
OXYGEN SATURATION: 98 % | WEIGHT: 160 LBS | RESPIRATION RATE: 14 BRPM | SYSTOLIC BLOOD PRESSURE: 122 MMHG | HEIGHT: 66 IN | DIASTOLIC BLOOD PRESSURE: 68 MMHG | HEART RATE: 80 BPM | BODY MASS INDEX: 25.71 KG/M2 | TEMPERATURE: 98.9 F

## 2024-04-04 DIAGNOSIS — R80.9 TYPE 2 DIABETES MELLITUS WITH ALBUMINURIA (HCC): ICD-10-CM

## 2024-04-04 DIAGNOSIS — E11.29 TYPE 2 DIABETES MELLITUS WITH ALBUMINURIA (HCC): ICD-10-CM

## 2024-04-04 DIAGNOSIS — J45.40 MODERATE PERSISTENT ASTHMA WITHOUT COMPLICATION: ICD-10-CM

## 2024-04-04 LAB
HBA1C MFR BLD: 7 % (ref ?–5.8)
POCT INT CON NEG: NEGATIVE
POCT INT CON POS: POSITIVE

## 2024-04-04 PROCEDURE — 83036 HEMOGLOBIN GLYCOSYLATED A1C: CPT | Performed by: FAMILY MEDICINE

## 2024-04-04 PROCEDURE — 99214 OFFICE O/P EST MOD 30 MIN: CPT | Performed by: FAMILY MEDICINE

## 2024-04-04 PROCEDURE — 3078F DIAST BP <80 MM HG: CPT | Performed by: FAMILY MEDICINE

## 2024-04-04 PROCEDURE — 3074F SYST BP LT 130 MM HG: CPT | Performed by: FAMILY MEDICINE

## 2024-04-04 RX ORDER — FLUTICASONE PROPIONATE AND SALMETEROL 250; 50 UG/1; UG/1
1 POWDER RESPIRATORY (INHALATION) EVERY 12 HOURS
Qty: 1 EACH | Refills: 11 | Status: SHIPPED | OUTPATIENT
Start: 2024-04-04

## 2024-04-04 ASSESSMENT — FIBROSIS 4 INDEX: FIB4 SCORE: .938035294241931271

## 2024-04-04 NOTE — PROGRESS NOTES
"CHIEF COMPLAINT / REASON FOR VISIT  Jerome Mendoza is a 68 y.o. male that presents today for   Chief Complaint   Patient presents with    Follow-Up     HISTORY OF PRESENT ILLNESS  Asthma uncontrolled, dyspnea and wheezing.  Having to use albuterol inhaler several times per day    Rowing 45 min a couple days per week.     OBJECTIVE     /68 (BP Location: Right arm, Patient Position: Sitting, BP Cuff Size: Adult)   Pulse 80   Temp 37.2 °C (98.9 °F) (Temporal)   Resp 14   Ht 1.676 m (5' 6\")   Wt 72.6 kg (160 lb)   SpO2 98%   BMI 25.82 kg/m²      PHYSICAL EXAM  Constitutional: Sitting comfortably, in no acute distress, responds to questions appropriately.  Skin: Warm and dry, no rashes or lesions on face or exposed upper extremities    ASSESSMENT & PLAN  1. 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  Continue Trulicity 3 mg weekly, glimepiride 1 mg daily, metformin 1000 mg twice daily.  Previously discontinued Jardiance due to cost of medication.  He will work on diet and exercise for better glycemic control.  Follow-up in 3 months with repeat A1c.  If needed in future could increase Trulicity dose to 4.5 mg  - POCT A1C    2. Moderate persistent asthma without complication  Chronic, uncontrolled.  Initiate maintenance inhaler therapy with Advair 250-50 mcg 1 puff twice daily.  Continue albuterol inhaler every 4 hours as needed  - fluticasone-salmeterol (ADVAIR) 250-50 MCG/ACT AEROSOL POWDER, BREATH ACTIVATED; Inhale 1 Puff every 12 hours.  Dispense: 1 Each; Refill: 11      "

## 2024-04-09 ENCOUNTER — PATIENT OUTREACH (OUTPATIENT)
Dept: MEDICAL GROUP | Facility: PHYSICIAN GROUP | Age: 69
End: 2024-04-09
Payer: MEDICARE

## 2024-04-09 NOTE — PROGRESS NOTES
Jerome called and [facility] asking for return call. Returned call, Jerome needs help reinserting his artificial eye. He arrives at clinic, he had washed the prosthetic and it was in a sandwich bag. Washed hands and inserted artificial. Jerome blinked a few times to ensure the prosthetic was seated properly, then thanked me for inserting it.

## 2024-05-27 DIAGNOSIS — R80.9 TYPE 2 DIABETES MELLITUS WITH ALBUMINURIA (HCC): ICD-10-CM

## 2024-05-27 DIAGNOSIS — E11.29 TYPE 2 DIABETES MELLITUS WITH ALBUMINURIA (HCC): ICD-10-CM

## 2024-05-29 ENCOUNTER — TELEPHONE (OUTPATIENT)
Dept: MEDICAL GROUP | Facility: PHYSICIAN GROUP | Age: 69
End: 2024-05-29
Payer: MEDICARE

## 2024-05-29 DIAGNOSIS — E11.29 TYPE 2 DIABETES MELLITUS WITH ALBUMINURIA (HCC): ICD-10-CM

## 2024-05-29 DIAGNOSIS — R80.9 TYPE 2 DIABETES MELLITUS WITH ALBUMINURIA (HCC): ICD-10-CM

## 2024-05-29 RX ORDER — METFORMIN HYDROCHLORIDE 500 MG/1
1000 TABLET, EXTENDED RELEASE ORAL 2 TIMES DAILY
Qty: 400 TABLET | Refills: 0 | OUTPATIENT
Start: 2024-05-29

## 2024-05-29 RX ORDER — METFORMIN HYDROCHLORIDE 500 MG/1
1000 TABLET, EXTENDED RELEASE ORAL 2 TIMES DAILY
Qty: 360 TABLET | Refills: 3 | Status: SHIPPED | OUTPATIENT
Start: 2024-05-29

## 2024-05-29 NOTE — TELEPHONE ENCOUNTER
Patient came in today requesting a refill on Metformin he is almost out and would like it filled before Dr gayle leaves. Please advise. Thank you

## 2024-06-10 ENCOUNTER — OFFICE VISIT (OUTPATIENT)
Dept: URGENT CARE | Facility: PHYSICIAN GROUP | Age: 69
End: 2024-06-10
Payer: MEDICARE

## 2024-06-10 VITALS
TEMPERATURE: 97.2 F | SYSTOLIC BLOOD PRESSURE: 118 MMHG | RESPIRATION RATE: 16 BRPM | WEIGHT: 161.8 LBS | DIASTOLIC BLOOD PRESSURE: 62 MMHG | HEART RATE: 94 BPM | HEIGHT: 66 IN | OXYGEN SATURATION: 97 % | BODY MASS INDEX: 26 KG/M2

## 2024-06-10 DIAGNOSIS — B02.9 HERPES ZOSTER WITHOUT COMPLICATION: ICD-10-CM

## 2024-06-10 PROCEDURE — 3078F DIAST BP <80 MM HG: CPT | Performed by: NURSE PRACTITIONER

## 2024-06-10 PROCEDURE — 99213 OFFICE O/P EST LOW 20 MIN: CPT | Performed by: NURSE PRACTITIONER

## 2024-06-10 PROCEDURE — 3074F SYST BP LT 130 MM HG: CPT | Performed by: NURSE PRACTITIONER

## 2024-06-10 ASSESSMENT — ENCOUNTER SYMPTOMS
CONSTITUTIONAL NEGATIVE: 1
FEVER: 0
CHILLS: 0

## 2024-06-10 ASSESSMENT — VISUAL ACUITY: OU: 1

## 2024-06-10 ASSESSMENT — FIBROSIS 4 INDEX: FIB4 SCORE: .938035294241931271

## 2024-06-10 NOTE — PROGRESS NOTES
Subjective:     Jerome Mendoza is a 68 y.o. male who presents for Rash (Shingle possible, sore R chin,x 1 week)       Rash  This is a new problem. The problem has been gradually worsening since onset. Pertinent negatives include no fever.     Patient reports 1 week ago, he started to develop what appeared to be fluid-filled lesions below his right shin and at his right lateral neck.  Mildly irritated and patient has been scratching at the lesions.  Otherwise, denies pain, fever, itching, or other symptoms.    Reports wife was recently treated for shingles.   reports being her primary caregiver.    Review of Systems   Constitutional: Negative.  Negative for chills, fever and malaise/fatigue.   Skin:  Positive for rash. Negative for itching.   All other systems reviewed and are negative.    Refer to HPI for additional details.    During this visit, appropriate PPE was worn, and hand hygiene was performed.    PMH:  has a past medical history of Asthma (6/19/2014), Hearing loss of both ears (6/19/2014), Hyperlipidemia (6/19/2014), Lower urinary tract symptoms (LUTS) (11/17/2020), Type II or unspecified type diabetes mellitus without mention of complication, not stated as uncontrolled (6/19/2014), and Wears hearing aid (6/19/2014).    MEDS:   Current Outpatient Medications:     metFORMIN ER (GLUCOPHAGE XR) 500 MG TABLET SR 24 HR, Take 2 Tablets by mouth 2 times a day., Disp: 360 Tablet, Rfl: 3    fluticasone-salmeterol (ADVAIR) 250-50 MCG/ACT AEROSOL POWDER, BREATH ACTIVATED, Inhale 1 Puff every 12 hours., Disp: 1 Each, Rfl: 11    albuterol 108 (90 Base) MCG/ACT Aero Soln inhalation aerosol, INHALE 1 TO 2 PUFFS BY MOUTH EVERY 4 HOURS AS NEEDED FOR SHORTNESS OF BREATH, Disp: 18 g, Rfl: 11    lisinopril (PRINIVIL) 20 MG Tab, Take 1 Tablet by mouth every day., Disp: 100 Tablet, Rfl: 3    polymixin-trimethoprim (POLYTRIM) 31448-3.1 UNIT/ML-% Solution, 1 Drop every hour. Apply to affected eye while awake, Disp: ,  Rfl:     moxifloxacin (VIGAMOX) 0.5 % Solution, 1 Drop every hour. Apply to affected eye while awake, Disp: , Rfl:     Ascorbic Acid (VITAMIN C PO), Take 1 Tablet by mouth every morning., Disp: , Rfl:     Omega-3 Fatty Acids (OMEGA-3 PO), Take 1 Capsule by mouth every morning., Disp: , Rfl:     VITAMIN D PO, Take 1 Tablet by mouth every morning., Disp: , Rfl:     pravastatin (PRAVACHOL) 20 MG Tab, TAKE 1 TABLET BY MOUTH ONCE DAILY IN THE EVENING, Disp: 100 Tablet, Rfl: 3    Dulaglutide (TRULICITY) 3 MG/0.5ML Solution Pen-injector, INJECT 3 MG (0.5 ML) UNDER THE SKIN ONCE A WEEK, Disp: 8 mL, Rfl: 3    glimepiride (AMARYL) 1 MG tablet, Take 1 Tablet by mouth every morning., Disp: 90 Tablet, Rfl: 3    montelukast (SINGULAIR) 10 MG Tab, Take 1 Tablet by mouth every day., Disp: 90 Tablet, Rfl: 3    fluticasone (FLONASE) 50 MCG/ACT nasal spray, Administer 2 Sprays into affected nostril(S) every day., Disp: 36 mL, Rfl: 3    tamsulosin (FLOMAX) 0.4 MG capsule, TAKE 1 CAPSULE BY MOUTH ONCE DAILY 30  MINUTES  AFTER  BREAKFAST, Disp: 90 Capsule, Rfl: 3    ALLERGIES:   Allergies   Allergen Reactions    Penicillin G Swelling     SURGHX:   Past Surgical History:   Procedure Laterality Date    EYE ENUCLEATION Left 12/1/2023    Procedure: LEFT ENUCLEATION, IMPLANT, FROST SUTURES;  Surgeon: Gustavo Castro M.D.;  Location: SURGERY SAME DAY [facility];  Service: Ophthalmology    VITRECTOMY POSTERIOR Left 11/28/2023    Procedure: VITRECTOMY, MEMBRANE PEEL, WASHOUT, LENS IMPLANT REMOVAL;  Surgeon: Jalyn Cortez M.D.;  Location: SURGERY SAME DAY [facility];  Service: Ophthalmology    TONSILLECTOMY AND ADENOIDECTOMY      VASECTOMY       SOCHX:  reports that he has never smoked. He has never used smokeless tobacco. He reports that he does not drink alcohol and does not use drugs.    FH: Per HPI as applicable/pertinent.      Objective:     /62 (BP Location: Right arm, Patient Position: Sitting, BP Cuff Size: Adult)   Pulse 94   Temp  "36.2 °C (97.2 °F) (Temporal)   Resp 16   Ht 1.676 m (5' 6\")   Wt 73.4 kg (161 lb 12.8 oz)   SpO2 97%   BMI 26.12 kg/m²     Physical Exam  Nursing note reviewed.   Constitutional:       General: He is not in acute distress.     Appearance: He is well-developed. He is not ill-appearing or toxic-appearing.   Eyes:      General: Vision grossly intact.   Cardiovascular:      Rate and Rhythm: Normal rate.   Pulmonary:      Effort: Pulmonary effort is normal. No respiratory distress.   Musculoskeletal:         General: No deformity. Normal range of motion.   Skin:     General: Skin is warm and dry.      Coloration: Skin is not pale.      Findings: Rash present. Rash is papular and vesicular. Rash is not crusting.      Comments: Erythematous papular, vesicular cluster at right submandibular region with erythematous/swollen base, similar lesion at right lateral neck   Neurological:      Mental Status: He is alert and oriented to person, place, and time.      Motor: No weakness.   Psychiatric:         Behavior: Behavior normal. Behavior is cooperative.       Assessment/Plan:     1. Herpes zoster without complication    Mild symptoms. Recommend conservative treatment. Defer oral antiviral. Advise basic wound care with mild soapy water (stop peroxide). Advise OTC antibiotic ointment. Advise to avoid touching lesions. Monitor. Follow up in 7 days if symptoms do not improve or sooner if symptoms change or worsen.     Differential diagnosis, natural history, supportive care, over-the-counter symptom management per 's instructions, close monitoring, and indications for immediate follow-up discussed.     All questions answered. Patient agrees with the plan of care.  "

## 2024-06-11 ENCOUNTER — OFFICE VISIT (OUTPATIENT)
Dept: URGENT CARE | Facility: PHYSICIAN GROUP | Age: 69
End: 2024-06-11
Payer: MEDICARE

## 2024-06-11 VITALS
HEART RATE: 94 BPM | HEIGHT: 66 IN | RESPIRATION RATE: 20 BRPM | OXYGEN SATURATION: 97 % | WEIGHT: 161 LBS | BODY MASS INDEX: 25.88 KG/M2 | TEMPERATURE: 97 F | SYSTOLIC BLOOD PRESSURE: 124 MMHG | DIASTOLIC BLOOD PRESSURE: 78 MMHG
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
## 2024-06-11 DIAGNOSIS — T14.8XXA ABRASION: ICD-10-CM

## 2024-06-11 PROCEDURE — 99214 OFFICE O/P EST MOD 30 MIN: CPT

## 2024-06-11 ASSESSMENT — FIBROSIS 4 INDEX: FIB4 SCORE: .938035294241931271
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
## 2024-07-25 ENCOUNTER — OFFICE VISIT (OUTPATIENT)
Dept: URGENT CARE | Facility: PHYSICIAN GROUP | Age: 69
End: 2024-07-25
Payer: MEDICARE

## 2024-07-25 VITALS
HEIGHT: 66 IN | SYSTOLIC BLOOD PRESSURE: 126 MMHG | TEMPERATURE: 98.4 F | RESPIRATION RATE: 18 BRPM | OXYGEN SATURATION: 99 % | DIASTOLIC BLOOD PRESSURE: 70 MMHG | WEIGHT: 160 LBS | HEART RATE: 90 BPM | BODY MASS INDEX: 25.71 KG/M2

## 2024-07-25 DIAGNOSIS — Z76.0 MEDICATION REFILL: ICD-10-CM

## 2024-07-25 DIAGNOSIS — R09.81 SINUS CONGESTION: ICD-10-CM

## 2024-07-25 PROCEDURE — 3078F DIAST BP <80 MM HG: CPT | Performed by: PHYSICIAN ASSISTANT

## 2024-07-25 PROCEDURE — 99213 OFFICE O/P EST LOW 20 MIN: CPT | Performed by: PHYSICIAN ASSISTANT

## 2024-07-25 PROCEDURE — 3074F SYST BP LT 130 MM HG: CPT | Performed by: PHYSICIAN ASSISTANT

## 2024-07-25 RX ORDER — TIMOLOL MALEATE 5 MG/ML
SOLUTION/ DROPS OPHTHALMIC
COMMUNITY
Start: 2024-06-27

## 2024-07-25 RX ORDER — FLUTICASONE PROPIONATE 50 MCG
2 SPRAY, SUSPENSION (ML) NASAL DAILY
Qty: 16 G | Refills: 3 | Status: SHIPPED | OUTPATIENT
Start: 2024-07-25

## 2024-07-25 RX ORDER — PREDNISOLONE ACETATE 10 MG/ML
SUSPENSION/ DROPS OPHTHALMIC
COMMUNITY
Start: 2024-07-18

## 2024-07-25 ASSESSMENT — ENCOUNTER SYMPTOMS
COUGH: 0
CHILLS: 0
FEVER: 0

## 2024-07-25 ASSESSMENT — FIBROSIS 4 INDEX: FIB4 SCORE: .938035294241931271

## 2024-08-07 DIAGNOSIS — R80.9 TYPE 2 DIABETES MELLITUS WITH ALBUMINURIA (HCC): ICD-10-CM

## 2024-08-07 DIAGNOSIS — E11.29 TYPE 2 DIABETES MELLITUS WITH ALBUMINURIA (HCC): ICD-10-CM

## 2024-08-07 RX ORDER — GLIMEPIRIDE 1 MG/1
1 TABLET ORAL EVERY MORNING
Qty: 90 TABLET | Refills: 0 | Status: SHIPPED | OUTPATIENT
Start: 2024-08-07

## 2024-09-20 ENCOUNTER — TELEPHONE (OUTPATIENT)
Dept: HEALTH INFORMATION MANAGEMENT | Facility: OTHER | Age: 69
End: 2024-09-20
Payer: MEDICARE

## 2024-10-03 DIAGNOSIS — R39.9 LOWER URINARY TRACT SYMPTOMS (LUTS): ICD-10-CM

## 2024-10-03 RX ORDER — TAMSULOSIN HYDROCHLORIDE 0.4 MG/1
CAPSULE ORAL
Qty: 100 CAPSULE | Refills: 0 | Status: SHIPPED | OUTPATIENT
Start: 2024-10-03

## 2024-11-05 ENCOUNTER — OFFICE VISIT (OUTPATIENT)
Dept: MEDICAL GROUP | Facility: PHYSICIAN GROUP | Age: 69
End: 2024-11-05
Payer: MEDICARE

## 2024-11-05 VITALS
DIASTOLIC BLOOD PRESSURE: 70 MMHG | HEART RATE: 71 BPM | OXYGEN SATURATION: 96 % | WEIGHT: 158.6 LBS | TEMPERATURE: 98.2 F | HEIGHT: 66 IN | BODY MASS INDEX: 25.49 KG/M2 | SYSTOLIC BLOOD PRESSURE: 122 MMHG

## 2024-11-05 DIAGNOSIS — Z12.5 ENCOUNTER FOR SCREENING FOR MALIGNANT NEOPLASM OF PROSTATE: ICD-10-CM

## 2024-11-05 DIAGNOSIS — R39.9 LOWER URINARY TRACT SYMPTOMS (LUTS): ICD-10-CM

## 2024-11-05 DIAGNOSIS — E78.2 MIXED HYPERLIPIDEMIA: ICD-10-CM

## 2024-11-05 DIAGNOSIS — R09.81 NASAL CONGESTION WITH RHINORRHEA: ICD-10-CM

## 2024-11-05 DIAGNOSIS — J34.89 NASAL CONGESTION WITH RHINORRHEA: ICD-10-CM

## 2024-11-05 DIAGNOSIS — R80.9 TYPE 2 DIABETES MELLITUS WITH ALBUMINURIA (HCC): ICD-10-CM

## 2024-11-05 DIAGNOSIS — Z13.0 SCREENING FOR DEFICIENCY ANEMIA: ICD-10-CM

## 2024-11-05 DIAGNOSIS — H35.00 RETINOPATHY OF RIGHT EYE: ICD-10-CM

## 2024-11-05 DIAGNOSIS — E11.319 TYPE 2 DIABETES MELLITUS WITH RETINOPATHY OF BOTH EYES, WITHOUT LONG-TERM CURRENT USE OF INSULIN, MACULAR EDEMA PRESENCE UNSPECIFIED, UNSPECIFIED RETINOPATHY SEVERITY (HCC): ICD-10-CM

## 2024-11-05 DIAGNOSIS — J45.20 MILD INTERMITTENT ASTHMA WITHOUT COMPLICATION: ICD-10-CM

## 2024-11-05 DIAGNOSIS — E11.29 TYPE 2 DIABETES MELLITUS WITH ALBUMINURIA (HCC): ICD-10-CM

## 2024-11-05 LAB
HBA1C MFR BLD: 6.4 % (ref ?–5.8)
POCT INT CON NEG: NEGATIVE
POCT INT CON POS: POSITIVE

## 2024-11-05 PROCEDURE — 3074F SYST BP LT 130 MM HG: CPT | Performed by: FAMILY MEDICINE

## 2024-11-05 PROCEDURE — 3078F DIAST BP <80 MM HG: CPT | Performed by: FAMILY MEDICINE

## 2024-11-05 PROCEDURE — 83036 HEMOGLOBIN GLYCOSYLATED A1C: CPT | Performed by: FAMILY MEDICINE

## 2024-11-05 PROCEDURE — 99214 OFFICE O/P EST MOD 30 MIN: CPT | Performed by: FAMILY MEDICINE

## 2024-11-05 RX ORDER — GLIMEPIRIDE 1 MG/1
1 TABLET ORAL EVERY MORNING
Qty: 90 TABLET | Refills: 0 | Status: SHIPPED | OUTPATIENT
Start: 2024-11-05

## 2024-11-05 RX ORDER — MONTELUKAST SODIUM 10 MG/1
10 TABLET ORAL DAILY
Qty: 90 TABLET | Refills: 3 | Status: SHIPPED | OUTPATIENT
Start: 2024-11-05

## 2024-11-05 RX ORDER — FLUTICASONE PROPIONATE 50 MCG
2 SPRAY, SUSPENSION (ML) NASAL DAILY
Qty: 36 ML | Refills: 3 | Status: SHIPPED | OUTPATIENT
Start: 2024-11-05

## 2024-11-05 RX ORDER — TAMSULOSIN HYDROCHLORIDE 0.4 MG/1
CAPSULE ORAL
Qty: 100 CAPSULE | Refills: 0 | Status: SHIPPED | OUTPATIENT
Start: 2024-11-05

## 2024-11-05 RX ORDER — PRAVASTATIN SODIUM 20 MG
20 TABLET ORAL EVERY EVENING
Qty: 100 TABLET | Refills: 3 | Status: SHIPPED | OUTPATIENT
Start: 2024-11-05

## 2024-11-05 ASSESSMENT — FIBROSIS 4 INDEX: FIB4 SCORE: 0.95

## 2024-11-06 NOTE — PROGRESS NOTES
"Verbal consent was acquired by the patient to use [employer] ambient listening note generation during this visit.    Subjective:     HPI:   History of Present Illness  The patient presents for evaluation of multiple medical concerns.    He is seeking a refill of his glimepiride prescription, which was previously not approved. He does not monitor his blood sugar levels at home but reports that they have been stable for some time. His lowest recorded A1c level was 6.3, achieved two years ago. He maintains a careful diet and is aware of the consequences of deviating from it. He takes metformin 1000 mg twice daily and has no history of thyroid issues. He has a prosthetic device, which was not lost due to diabetes, but rather a severe bacterial infection that damaged his implant. He continues to receive injections for diabetic retinopathy and also has glaucoma in one eye. He is under the care of an ophthalmologist, Dr. Calabrese, and receives injections every 2.5 to 3 months, with the next one scheduled for December 2024. He visits a podiatrist twice a year for toenail care due to his vision impairment and to prevent potential toe injuries. He has declined the influenza vaccine.    For long-term asthma management, he uses Advair and is unsure about the need for refills. He administers two sprays of nasal spray in each nostril daily and occasionally uses Ventolin, which he finds effective in relieving choking episodes.    He is on tamsulosin for prostate health and reports that it takes him 10 minutes to urinate, which he finds frustrating. His last PSA test was conducted in September 2023.      Objective:     Exam:  /70 (BP Location: Left arm, Patient Position: Sitting, BP Cuff Size: Adult)   Pulse 71   Temp 36.8 °C (98.2 °F) (Temporal)   Ht 1.676 m (5' 6\")   Wt 71.9 kg (158 lb 9.6 oz)   SpO2 96%   BMI 25.60 kg/m²  Body mass index is 25.6 kg/m².    Physical Exam  Vitals reviewed.   Constitutional:       " Appearance: Normal appearance.   HENT:      Head: Normocephalic and atraumatic.      Nose: Nose normal.   Cardiovascular:      Rate and Rhythm: Normal rate and regular rhythm.      Pulses: Normal pulses.      Heart sounds: Normal heart sounds. No murmur heard.  Pulmonary:      Effort: Pulmonary effort is normal.      Breath sounds: Normal breath sounds.   Abdominal:      General: Abdomen is flat.      Palpations: Abdomen is soft.   Skin:     General: Skin is warm and dry.   Neurological:      Mental Status: He is alert.   Psychiatric:         Mood and Affect: Mood normal.         Behavior: Behavior normal.             Results  Laboratory Studies  A1c is 7.    Assessment & Plan:     1. Lower urinary tract symptoms (LUTS)  tamsulosin (FLOMAX) 0.4 MG capsule      2. 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 Mixed hyperlipidemia  pravastatin (PRAVACHOL) 20 MG Tab    Lipid Profile      4. Nasal congestion with rhinorrhea  montelukast (SINGULAIR) 10 MG Tab    fluticasone (FLONASE) 50 MCG/ACT nasal spray      5. Mild intermittent asthma without complication  montelukast (SINGULAIR) 10 MG Tab      6. Retinopathy of right eye        7. Type 2 diabetes mellitus with retinopathy of both eyes, without long-term current use of insulin, macular edema presence unspecified, unspecified retinopathy severity (HCC)        8. Screening for deficiency anemia  CBC WITHOUT DIFFERENTIAL      9. Encounter for screening for malignant neoplasm of prostate  PROSTATE SPECIFIC AG SCREENING          Assessment & Plan  1. Type 2 Diabetes Mellitus.  His A1c level is currently at 7, which is within the acceptable range of under 8. The lowest recorded A1c was 6.3, two years ago. He is under the care of an ophthalmologist for diabetic retinopathy and glaucoma, and it is crucial to maintain this follow-up to preserve his  remaining good eye. He is also on Trulicity for diabetes management and has a sufficient supply of metformin, which was prescribed for a year in May 2024. He is also under the care of a podiatrist for toenail clipping, which is important to prevent potential injuries due to his vision impairment. All his medications will be refilled. He is advised to continue his follow-up with the ophthalmologist. Fasting labs will be ordered to be done before the next visit in January 2025. These labs will include cholesterol, kidney function, liver function, electrolytes, and CBC. A urine test will also be conducted to check for protein, which could indicate micro changes in the kidney due to high sugar levels. A diabetic foot exam will be conducted today. He is advised to continue seeing the podiatrist for toenail clipping.    2. Asthma.  He is currently on Advair for long-term asthma management and has a sufficient supply. He also uses a nasal spray, which he may be running out of, and Ventolin, which he has enough of until next year. The nasal spray will be refilled.    3. Prostate issues.  He is on tamsulosin for prostate issues and his last PSA test was conducted in September 2023. His PSA will be checked to ensure it remains within the normal range.    Follow-up  He will follow up in January 2025.      No follow-ups on file.    Please note that this dictation was created using voice recognition software. I have made every reasonable attempt to correct obvious errors, but I expect that there are errors of grammar and possibly content that I did not discover before finalizing the note.    Mary Asif MD  Family Medicine and Non - Operative Sports Medicine   [facility] [place]

## 2025-01-06 ENCOUNTER — HOSPITAL ENCOUNTER (OUTPATIENT)
Dept: LAB | Facility: MEDICAL CENTER | Age: 70
End: 2025-01-06
Attending: FAMILY MEDICINE
Payer: MEDICARE

## 2025-01-06 DIAGNOSIS — E11.29 TYPE 2 DIABETES MELLITUS WITH ALBUMINURIA (HCC): ICD-10-CM

## 2025-01-06 DIAGNOSIS — E78.2 MIXED HYPERLIPIDEMIA: ICD-10-CM

## 2025-01-06 DIAGNOSIS — Z12.5 ENCOUNTER FOR SCREENING FOR MALIGNANT NEOPLASM OF PROSTATE: ICD-10-CM

## 2025-01-06 DIAGNOSIS — Z13.0 SCREENING FOR DEFICIENCY ANEMIA: ICD-10-CM

## 2025-01-06 DIAGNOSIS — R80.9 TYPE 2 DIABETES MELLITUS WITH ALBUMINURIA (HCC): ICD-10-CM

## 2025-01-06 LAB
ALBUMIN SERPL BCP-MCNC: 3.9 G/DL (ref 3.2–4.9)
ALBUMIN/GLOB SERPL: 1.5 G/DL
ALP SERPL-CCNC: 51 U/L (ref 30–99)
ALT SERPL-CCNC: 13 U/L (ref 2–50)
ANION GAP SERPL CALC-SCNC: 11 MMOL/L (ref 7–16)
AST SERPL-CCNC: 11 U/L (ref 12–45)
BILIRUB SERPL-MCNC: 0.3 MG/DL (ref 0.1–1.5)
BUN SERPL-MCNC: 15 MG/DL (ref 8–22)
CALCIUM ALBUM COR SERPL-MCNC: 9.3 MG/DL (ref 8.5–10.5)
CALCIUM SERPL-MCNC: 9.2 MG/DL (ref 8.5–10.5)
CHLORIDE SERPL-SCNC: 103 MMOL/L (ref 96–112)
CHOLEST SERPL-MCNC: 140 MG/DL (ref 100–199)
CO2 SERPL-SCNC: 22 MMOL/L (ref 20–33)
CREAT SERPL-MCNC: 0.89 MG/DL (ref 0.5–1.4)
CREAT UR-MCNC: 34.64 MG/DL
ERYTHROCYTE [DISTWIDTH] IN BLOOD BY AUTOMATED COUNT: 44.7 FL (ref 35.9–50)
GFR SERPLBLD CREATININE-BSD FMLA CKD-EPI: 92 ML/MIN/1.73 M 2
GLOBULIN SER CALC-MCNC: 2.6 G/DL (ref 1.9–3.5)
GLUCOSE SERPL-MCNC: 155 MG/DL (ref 65–99)
HCT VFR BLD AUTO: 31.9 % (ref 42–52)
HDLC SERPL-MCNC: 32 MG/DL
HGB BLD-MCNC: 10.8 G/DL (ref 14–18)
LDLC SERPL CALC-MCNC: 74 MG/DL
MCH RBC QN AUTO: 32.5 PG (ref 27–33)
MCHC RBC AUTO-ENTMCNC: 33.9 G/DL (ref 32.3–36.5)
MCV RBC AUTO: 96.1 FL (ref 81.4–97.8)
MICROALBUMIN UR-MCNC: 18.8 MG/DL
MICROALBUMIN/CREAT UR: 543 MG/G (ref 0–30)
PLATELET # BLD AUTO: 250 K/UL (ref 164–446)
PMV BLD AUTO: 9.4 FL (ref 9–12.9)
POTASSIUM SERPL-SCNC: 4.8 MMOL/L (ref 3.6–5.5)
PROT SERPL-MCNC: 6.5 G/DL (ref 6–8.2)
PSA SERPL DL<=0.01 NG/ML-MCNC: 0.79 NG/ML (ref 0–4)
RBC # BLD AUTO: 3.32 M/UL (ref 4.7–6.1)
SODIUM SERPL-SCNC: 136 MMOL/L (ref 135–145)
TRIGL SERPL-MCNC: 171 MG/DL (ref 0–149)
WBC # BLD AUTO: 4.9 K/UL (ref 4.8–10.8)

## 2025-01-06 PROCEDURE — 80053 COMPREHEN METABOLIC PANEL: CPT

## 2025-01-06 PROCEDURE — 36415 COLL VENOUS BLD VENIPUNCTURE: CPT

## 2025-01-06 PROCEDURE — 82570 ASSAY OF URINE CREATININE: CPT

## 2025-01-06 PROCEDURE — 84153 ASSAY OF PSA TOTAL: CPT

## 2025-01-06 PROCEDURE — 80061 LIPID PANEL: CPT

## 2025-01-06 PROCEDURE — 85027 COMPLETE CBC AUTOMATED: CPT

## 2025-01-06 PROCEDURE — 82043 UR ALBUMIN QUANTITATIVE: CPT

## 2025-01-07 ENCOUNTER — OFFICE VISIT (OUTPATIENT)
Dept: MEDICAL GROUP | Facility: PHYSICIAN GROUP | Age: 70
End: 2025-01-07
Payer: MEDICARE

## 2025-01-07 VITALS
WEIGHT: 158.6 LBS | HEART RATE: 88 BPM | TEMPERATURE: 98.5 F | BODY MASS INDEX: 25.49 KG/M2 | DIASTOLIC BLOOD PRESSURE: 64 MMHG | SYSTOLIC BLOOD PRESSURE: 126 MMHG | HEIGHT: 66 IN | OXYGEN SATURATION: 98 %

## 2025-01-07 DIAGNOSIS — E11.69 DYSLIPIDEMIA ASSOCIATED WITH TYPE 2 DIABETES MELLITUS (HCC): ICD-10-CM

## 2025-01-07 DIAGNOSIS — E11.319 TYPE 2 DIABETES MELLITUS WITH RETINOPATHY OF BOTH EYES, WITHOUT LONG-TERM CURRENT USE OF INSULIN, MACULAR EDEMA PRESENCE UNSPECIFIED, UNSPECIFIED RETINOPATHY SEVERITY (HCC): ICD-10-CM

## 2025-01-07 DIAGNOSIS — E78.5 DYSLIPIDEMIA ASSOCIATED WITH TYPE 2 DIABETES MELLITUS (HCC): ICD-10-CM

## 2025-01-07 DIAGNOSIS — I15.2 HYPERTENSION ASSOCIATED WITH DIABETES (HCC): ICD-10-CM

## 2025-01-07 DIAGNOSIS — E11.59 HYPERTENSION ASSOCIATED WITH DIABETES (HCC): ICD-10-CM

## 2025-01-07 DIAGNOSIS — E11.29 TYPE 2 DIABETES MELLITUS WITH ALBUMINURIA (HCC): ICD-10-CM

## 2025-01-07 DIAGNOSIS — R80.9 TYPE 2 DIABETES MELLITUS WITH ALBUMINURIA (HCC): ICD-10-CM

## 2025-01-07 PROCEDURE — 3078F DIAST BP <80 MM HG: CPT | Performed by: FAMILY MEDICINE

## 2025-01-07 PROCEDURE — 3074F SYST BP LT 130 MM HG: CPT | Performed by: FAMILY MEDICINE

## 2025-01-07 PROCEDURE — 99214 OFFICE O/P EST MOD 30 MIN: CPT | Performed by: FAMILY MEDICINE

## 2025-01-07 ASSESSMENT — PATIENT HEALTH QUESTIONNAIRE - PHQ9: CLINICAL INTERPRETATION OF PHQ2 SCORE: 0

## 2025-01-07 ASSESSMENT — FIBROSIS 4 INDEX: FIB4 SCORE: 0.87

## 2025-01-07 NOTE — PROGRESS NOTES
Verbal consent was acquired by the patient to use [employer] ambient listening note generation during this visit.    Subjective:     HPI:   History of Present Illness  The patient is a 69-year-old male presenting for the establishment of care as a new patient.    His medical history is significant for vitamin D deficiency, asthma, benign prostatic hyperplasia (BPH), obesity, hyperlipidemia, hypertension, microalbuminuria, diabetic retinopathy, peripheral vascular disease, type 2 diabetes mellitus, and a history of pulmonary embolism. His current medications include pravastatin, montelukast (Singulair), metformin, lisinopril, glimepiride, fluticasone/salmeterol (Advair), fluticasone nasal spray (Flonase), dulaglutide (Trulicity), vitamin C, and albuterol. Recent laboratory tests have been conducted, and he is due for colorectal cancer screening, Tdap, and shingles vaccines, which will be addressed during this visit. The patient reports experiencing urinary retention, with micturition taking approximately 10 minutes in the absence of tamsulosin (Flomax). He inquires whether this symptom is indicative of prostatic hypertrophy. His diabetes management has been effective, with stable HbA1c levels; however, he notes elevated fasting blood glucose levels in the morning, despite fasting for 20 hours. He adheres to a consistent diet and engages in physical activity, specifically rowing, for one hour 3 to 4 times per week. He receives quarterly intravitreal injections for diabetic retinopathy and glaucoma under the care of Dr. Madden. He denies any bleeding from the gastrointestinal, genitourinary, or respiratory tracts. The patient expresses concerns regarding the potential adverse effects of statins. He has declined the tetanus vaccine due to a lack of trust in the healthcare system. Additionally, he reports experiencing mild nasal congestion.    SOCIAL HISTORY  He does not smoke, drink alcohol, or use  "drugs.    MEDICATIONS  Current: pravastatin, Singulair, metformin, lisinopril, glimepiride, Advair, Flonase, Trulicity, vitamin C, albuterol    Health Maintenance: Completed    Objective:     Exam:  /64 (BP Location: Left arm, Patient Position: Sitting, BP Cuff Size: Adult)   Pulse 88   Temp 36.9 °C (98.5 °F) (Temporal)   Ht 1.676 m (5' 6\")   Wt 71.9 kg (158 lb 9.6 oz)   SpO2 98%   BMI 25.60 kg/m²  Body mass index is 25.6 kg/m².    Physical Exam  Vitals reviewed.   Constitutional:       Appearance: Normal appearance.   HENT:      Head: Normocephalic and atraumatic.      Nose: Nose normal.   Cardiovascular:      Rate and Rhythm: Normal rate and regular rhythm.      Pulses: Normal pulses.      Heart sounds: Normal heart sounds. No murmur heard.  Pulmonary:      Effort: Pulmonary effort is normal.      Breath sounds: Normal breath sounds.   Abdominal:      General: Abdomen is flat.      Palpations: Abdomen is soft.   Skin:     General: Skin is warm and dry.   Neurological:      Mental Status: He is alert.   Psychiatric:         Mood and Affect: Mood normal.         Behavior: Behavior normal.             Results  Laboratory Studies  Kidney function is 92. Prostate appears normal. Protein detected in urine. Glucose was slightly high. A1c was 6.4. Hemoglobin is slightly low. Cholesterol levels are good, triglycerides are slightly high but improved.    Assessment & Plan:     1. Dyslipidemia associated with type 2 diabetes mellitus ([facility])        2. 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[facility])        3. Hypertension associated with diabetes ([facility])        4. Type 2 diabetes mellitus with retinopathy of both eyes, without long-term current use of insulin, macular edema presence unspecified, unspecified retinopathy severity ([facility])            Assessment & Plan  1. Benign prostatic hyperplasia.  His prostate is enlarged but not cancerous. He will continue using Flomax as it has made a difference in his symptoms.    2. " Diabetes mellitus type 2.  His glucose levels were slightly elevated, but his A1c was 6.4, which is better than it was previously. He is advised to avoid excessive consumption of bread, pasta, rice, and sugary foods. He will continue his current medications: Trulicity, glimepiride, and metformin. A repeat A1c test will be conducted in 1 month. He is advised to monitor for symptoms of hypoglycemia and manage them appropriately.    3. Anemia of chronic disease.  His hemoglobin levels are slightly low, but there is no evidence of bleeding. He is advised to monitor for symptoms such as lightheadedness, dizziness, fatigue, chest pain, or shortness of breath. No changes to his current management plan are necessary at this time.    4. Hyperlipidemia.  His cholesterol levels are within normal limits, although his triglycerides are slightly elevated. He will continue his current statin therapy. Yearly monitoring of his lipid profile will be maintained.    5. Hypertension.  His blood pressure is well-controlled. He will continue his current antihypertensive medications without any changes.    6. Microalbuminuria.  He is spilling protein into his urine, which is a known complication of his diabetes. His condition has improved compared to a year ago. He will continue his current medication regimen.    7. Diabetic retinopathy.  He receives injections every 3 months for diabetic retinopathy and glaucoma. He will continue seeing his eye doctor, Dr. Madden, every 3 months.    8. Health maintenance.  He is due for colorectal cancer screening, Tdap, and shingles vaccines. He has declined the Tdap vaccine but is advised to consider it, especially before the arrival of his great-grandchild in June. He is also advised to get the shingles vaccine. A stool test for colorectal cancer screening is recommended every 3 years, but he has declined it at this time.    Follow-up  The patient is scheduled for a follow-up visit in 2 months.    HCC  Gap Form    Diagnosis to address: E11.69, E78.5 - Dyslipidemia associated with type 2 diabetes mellitus ([facility])  Assessment and plan: Chronic, stable. Continue with current defined treatment plan: Continue pravastatin 20 mg daily.  Lipid panel yearly.  Diet and exercise recommended. Follow-up at least annually.  Diagnosis: E11.3293 - Mild nonproliferative diabetic retinopathy of both eyes without macular edema associated with type 2 diabetes mellitus ([facility])  Assessment and plan: Chronic, stable. Continue with current defined treatment plan: Continue regular follow-up with ophthalmology every 3 months.  Injections and treatment as recommended.  Recommended continued close regulation of A1c to prevent progression of retinopathy.  Continue current diabetic treatment plan as well-controlled at this time.  Follow-up at least annually.  Diagnosis: E11.29, R80.9 - 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[facility])  Assessment and plan: Chronic, stable. Continue with current defined treatment plan: Plan with 1 mg glimepiride daily 1000 mg of metformin twice daily and Trulicity 3 mg per 0.5 mL, 0.5 mL injection q. 7 days.  A1c's every 3 to 6 months.  Most recent A1c 6.4.  Urine microalbumin yearly.  Continue lisinopril 20 mg daily. GFR 92.  Follow-up at least annually.  Diagnosis: E11.59, I15.2 - Hypertension associated with diabetes ([facility])  Assessment and plan: Chronic, stable. Continue with current defined treatment plan: Blood pressure within normal limits at 120s over 60s.  Continue lisinopril 20 mg daily. Follow-up at least annually.  Diagnosis: E11.319 - Type 2 diabetes mellitus with retinopathy of both eyes, without long-term current use of insulin, macular edema presence unspecified, unspecified retinopathy severity (HCC)  Assessment and plan: Chronic, stable. Continue with current defined treatment plan: Plan with 1 mg glimepiride daily 1000 mg of metformin twice daily and Trulicity 3 mg per 0.5 mL, 0.5 mL injection q. 7  days.  A1c's every 3 to 6 months.  Most recent A1c 6.4.  Urine microalbumin yearly.  Continue lisinopril 20 mg daily. GFR 92.  Follow-up at least annually.    Last edited 01/07/25 09:07 PST by Mary Asif M.D.         Return in about 2 months (around 3/7/2025) for F/U DM, get A1c.    Please note that this dictation was created using voice recognition software. I have made every reasonable attempt to correct obvious errors, but I expect that there are errors of grammar and possibly content that I did not discover before finalizing the note.    Mary Asif MD  Family Medicine and Non - Operative Sports Medicine   [facility]

## 2025-01-16 DIAGNOSIS — E11.59 HYPERTENSION ASSOCIATED WITH DIABETES (HCC): ICD-10-CM

## 2025-01-16 DIAGNOSIS — I15.2 HYPERTENSION ASSOCIATED WITH DIABETES (HCC): ICD-10-CM

## 2025-01-16 RX ORDER — LISINOPRIL 20 MG/1
20 TABLET ORAL DAILY
Qty: 100 TABLET | Refills: 0 | Status: SHIPPED | OUTPATIENT
Start: 2025-01-16

## 2025-01-16 NOTE — TELEPHONE ENCOUNTER
Received request via: Pharmacy    Was the patient seen in the last year in this department? Yes    Does the patient have an active prescription (recently filled or refills available) for medication(s) requested? No    Pharmacy Name: [facility]    Does the patient have correction Plus and need 100-day supply? (This applies to ALL medications) Yes, quantity updated to 100 days

## 2025-02-03 DIAGNOSIS — E11.29 TYPE 2 DIABETES MELLITUS WITH ALBUMINURIA (HCC): ICD-10-CM

## 2025-02-03 DIAGNOSIS — R80.9 TYPE 2 DIABETES MELLITUS WITH ALBUMINURIA (HCC): ICD-10-CM

## 2025-02-03 RX ORDER — GLIMEPIRIDE 1 MG/1
1 TABLET ORAL EVERY MORNING
Qty: 90 TABLET | Refills: 0 | Status: SHIPPED | OUTPATIENT
Start: 2025-02-03

## 2025-02-03 NOTE — TELEPHONE ENCOUNTER
Received request via: Patient    Was the patient seen in the last year in this department? Yes    Does the patient have an active prescription (recently filled or refills available) for medication(s) requested? No    Pharmacy Name: [id number] - [facility], [place] [address]     Does the patient have long-term Plus and need 100-day supply? (This applies to ALL medications) Yes, quantity updated to 100 days

## 2025-02-06 ENCOUNTER — DOCUMENTATION (OUTPATIENT)
Dept: MEDICAL GROUP | Facility: PHYSICIAN GROUP | Age: 70
End: 2025-02-06
Payer: MEDICARE

## 2025-02-06 NOTE — PROGRESS NOTES
Jerome dropped off a letter from [employer], the letter is notifying Jerome of need to re-enroll in their program. Per letter his current enrollment ends on 2/26/2025. Scanned letter into [employer]. Jerome is no longer enrolled on my caseload. Sending request for PAP assistance to Kimberly Shah.

## 2025-02-18 ENCOUNTER — TELEPHONE (OUTPATIENT)
Dept: MEDICAL GROUP | Facility: PHYSICIAN GROUP | Age: 70
End: 2025-02-18

## 2025-02-19 NOTE — TELEPHONE ENCOUNTER
Can John from [facility] [employer] called in regards to the PT and his PCP. They are wanting to get him est with a provider closer to his home and Can is needing an application filled out by Dr Asif. If someone can please reach out to Can at [phone] to touch base that would be appreciated.

## 2025-02-19 NOTE — TELEPHONE ENCOUNTER
Spoke with Can HUNT, member advocate at [facility] pt. Pt needs financial assistance for trulicity. Pt is going to the urgent care in [place] to start the process, income data, and insurance. I will look for these documents in  over the next few days and finish the process by sending the required info to the .

## 2025-02-26 DIAGNOSIS — E11.319 TYPE 2 DIABETES MELLITUS WITH RETINOPATHY OF BOTH EYES, WITHOUT LONG-TERM CURRENT USE OF INSULIN, MACULAR EDEMA PRESENCE UNSPECIFIED, UNSPECIFIED RETINOPATHY SEVERITY (HCC): ICD-10-CM

## 2025-02-26 DIAGNOSIS — R80.9 TYPE 2 DIABETES MELLITUS WITH ALBUMINURIA (HCC): ICD-10-CM

## 2025-02-26 DIAGNOSIS — E11.29 TYPE 2 DIABETES MELLITUS WITH ALBUMINURIA (HCC): ICD-10-CM

## 2025-02-26 RX ORDER — DULAGLUTIDE 3 MG/.5ML
3 INJECTION, SOLUTION SUBCUTANEOUS
Qty: 2 ML | Refills: 3 | Status: SHIPPED
Start: 2025-02-26

## 2025-02-28 PROBLEM — Z59.89 ON ECONOMIC ASSISTANCE PROGRAM: Status: ACTIVE | Noted: 2025-02-28

## 2025-03-19 DIAGNOSIS — E11.29 TYPE 2 DIABETES MELLITUS WITH ALBUMINURIA (HCC): ICD-10-CM

## 2025-03-19 DIAGNOSIS — R80.9 TYPE 2 DIABETES MELLITUS WITH ALBUMINURIA (HCC): ICD-10-CM

## 2025-03-19 DIAGNOSIS — E11.319 TYPE 2 DIABETES MELLITUS WITH RETINOPATHY OF BOTH EYES, WITHOUT LONG-TERM CURRENT USE OF INSULIN, MACULAR EDEMA PRESENCE UNSPECIFIED, UNSPECIFIED RETINOPATHY SEVERITY (HCC): ICD-10-CM

## 2025-03-19 RX ORDER — DULAGLUTIDE 3 MG/.5ML
3 INJECTION, SOLUTION SUBCUTANEOUS
Qty: 2 ML | Refills: 0 | Status: SHIPPED | OUTPATIENT
Start: 2025-03-19

## 2025-03-19 NOTE — TELEPHONE ENCOUNTER
Received request via: Pharmacy    Was the patient seen in the last year in this department? Yes    Does the patient have an active prescription (recently filled or refills available) for medication(s) requested? No    Pharmacy Name: [facility]    Does the patient have [facility] Plus and need 100-day supply? (This applies to ALL medications) Yes, quantity updated to 100 days

## 2025-04-15 DIAGNOSIS — R39.9 LOWER URINARY TRACT SYMPTOMS (LUTS): ICD-10-CM

## 2025-04-15 RX ORDER — TAMSULOSIN HYDROCHLORIDE 0.4 MG/1
CAPSULE ORAL
Qty: 100 CAPSULE | Refills: 3 | Status: SHIPPED | OUTPATIENT
Start: 2025-04-15

## 2025-04-15 NOTE — TELEPHONE ENCOUNTER
Received request via: Pharmacy    Was the patient seen in the last year in this department? Yes    Does the patient have an active prescription (recently filled or refills available) for medication(s) requested? No    [facility]    Does the patient have [facility] Plus and need 100-day supply? (This applies to ALL medications) Yes, quantity updated to 100 days

## 2025-04-16 DIAGNOSIS — J45.40 MODERATE PERSISTENT ASTHMA WITHOUT COMPLICATION: ICD-10-CM

## 2025-04-16 RX ORDER — FLUTICASONE PROPIONATE AND SALMETEROL 250; 50 UG/1; UG/1
1 POWDER RESPIRATORY (INHALATION) EVERY 12 HOURS
Qty: 1 EACH | Refills: 0 | Status: SHIPPED | OUTPATIENT
Start: 2025-04-16 | End: 2025-04-22 | Stop reason: SDUPTHER

## 2025-04-16 NOTE — TELEPHONE ENCOUNTER
Received request via: Pharmacy    Was the patient seen in the last year in this department? Yes    Does the patient have an active prescription (recently filled or refills available) for medication(s) requested? No    [facility]    Does the patient have long-term Plus and need 100-day supply? (This applies to ALL medications) Yes, quantity updated to 100 days

## 2025-04-22 DIAGNOSIS — J45.40 MODERATE PERSISTENT ASTHMA WITHOUT COMPLICATION: ICD-10-CM

## 2025-04-22 RX ORDER — FLUTICASONE PROPIONATE AND SALMETEROL 250; 50 UG/1; UG/1
1 POWDER RESPIRATORY (INHALATION) EVERY 12 HOURS
Qty: 3 EACH | Refills: 3 | Status: SHIPPED | OUTPATIENT
Start: 2025-04-22

## 2025-05-06 DIAGNOSIS — E11.29 TYPE 2 DIABETES MELLITUS WITH ALBUMINURIA (HCC): ICD-10-CM

## 2025-05-06 DIAGNOSIS — R80.9 TYPE 2 DIABETES MELLITUS WITH ALBUMINURIA (HCC): ICD-10-CM

## 2025-05-06 RX ORDER — GLIMEPIRIDE 1 MG/1
1 TABLET ORAL EVERY MORNING
Qty: 90 TABLET | Refills: 0 | Status: SHIPPED | OUTPATIENT
Start: 2025-05-06

## 2025-05-06 NOTE — TELEPHONE ENCOUNTER
Received request via: Pharmacy    Was the patient seen in the last year in this department? Yes    Does the patient have an active prescription (recently filled or refills available) for medication(s) requested? No    [facility] Name: [facility]    Does the patient have retirement Plus and need 100-day supply? (This applies to ALL medications) Yes, quantity updated to 100 days

## 2025-05-07 DIAGNOSIS — J45.20 MILD INTERMITTENT ASTHMA WITHOUT COMPLICATION: ICD-10-CM

## 2025-05-07 RX ORDER — ALBUTEROL SULFATE 90 UG/1
1-2 INHALANT RESPIRATORY (INHALATION) EVERY 4 HOURS PRN
Qty: 18 G | Refills: 0 | Status: SHIPPED | OUTPATIENT
Start: 2025-05-07

## 2025-05-07 NOTE — TELEPHONE ENCOUNTER
Received request via: Pharmacy    Was the patient seen in the last year in this department? Yes    Does the patient have an active prescription (recently filled or refills available) for medication(s) requested? No    Pharmacy Name: [facility]    Does the patient have half-way Plus and need 100-day supply? (This applies to ALL medications) Yes, quantity updated to 100 days

## 2025-05-12 DIAGNOSIS — E11.59 HYPERTENSION ASSOCIATED WITH DIABETES (HCC): ICD-10-CM

## 2025-05-12 DIAGNOSIS — I15.2 HYPERTENSION ASSOCIATED WITH DIABETES (HCC): ICD-10-CM

## 2025-05-12 RX ORDER — LISINOPRIL 20 MG/1
20 TABLET ORAL DAILY
Qty: 100 TABLET | Refills: 0 | Status: SHIPPED | OUTPATIENT
Start: 2025-05-12

## 2025-05-13 NOTE — TELEPHONE ENCOUNTER
Received request via: Patient    Was the patient seen in the last year in this department? Yes    Does the patient have an active prescription (recently filled or refills available) for medication(s) requested? No    Pharmacy Name: [id number] - [facility], [place] [address]     Does the patient have long term Plus and need 100-day supply? (This applies to ALL medications) Yes, quantity updated to 100 days

## 2025-06-05 DIAGNOSIS — R80.9 TYPE 2 DIABETES MELLITUS WITH ALBUMINURIA (HCC): ICD-10-CM

## 2025-06-05 DIAGNOSIS — E11.29 TYPE 2 DIABETES MELLITUS WITH ALBUMINURIA (HCC): ICD-10-CM

## 2025-06-05 RX ORDER — METFORMIN HYDROCHLORIDE 500 MG/1
1000 TABLET, EXTENDED RELEASE ORAL 2 TIMES DAILY
Qty: 360 TABLET | Refills: 3 | Status: SHIPPED | OUTPATIENT
Start: 2025-06-05

## 2025-06-05 NOTE — TELEPHONE ENCOUNTER
Received request via: Pharmacy    Was the patient seen in the last year in this department? Yes    Does the patient have an active prescription (recently filled or refills available) for medication(s) requested? No    [facility]: [id number]    Does the patient have care home Plus and need 100-day supply? (This applies to ALL medications) Yes, quantity updated to 100 days

## 2025-08-06 DIAGNOSIS — E11.29 TYPE 2 DIABETES MELLITUS WITH ALBUMINURIA (HCC): ICD-10-CM

## 2025-08-06 DIAGNOSIS — R80.9 TYPE 2 DIABETES MELLITUS WITH ALBUMINURIA (HCC): ICD-10-CM

## 2025-08-06 RX ORDER — GLIMEPIRIDE 1 MG/1
1 TABLET ORAL EVERY MORNING
Qty: 90 TABLET | Refills: 0 | Status: SHIPPED | OUTPATIENT
Start: 2025-08-06 | End: 2025-08-14

## 2025-08-14 DIAGNOSIS — E11.29 TYPE 2 DIABETES MELLITUS WITH ALBUMINURIA (HCC): ICD-10-CM

## 2025-08-14 DIAGNOSIS — R80.9 TYPE 2 DIABETES MELLITUS WITH ALBUMINURIA (HCC): ICD-10-CM

## 2025-08-14 RX ORDER — GLIMEPIRIDE 1 MG/1
1 TABLET ORAL EVERY MORNING
Qty: 100 TABLET | Refills: 0 | Status: SHIPPED | OUTPATIENT
Start: 2025-08-14

## 2025-08-22 DIAGNOSIS — I15.2 HYPERTENSION ASSOCIATED WITH DIABETES (HCC): ICD-10-CM

## 2025-08-22 DIAGNOSIS — E11.59 HYPERTENSION ASSOCIATED WITH DIABETES (HCC): ICD-10-CM

## 2025-08-24 RX ORDER — LISINOPRIL 20 MG/1
20 TABLET ORAL DAILY
Qty: 100 TABLET | Refills: 3 | Status: SHIPPED | OUTPATIENT
Start: 2025-08-24

## (undated) DEVICE — KIT  I.V. START (100EA/CA)

## (undated) DEVICE — SUTURE GENERAL

## (undated) DEVICE — SWAB CULTURE AMIES ESWAB (50EA/PK)

## (undated) DEVICE — CONTAINER, SPECIMEN, STERILE

## (undated) DEVICE — CANISTER SUCTION 3000ML MECHANICAL FILTER AUTO SHUTOFF MEDI-VAC NONSTERILE LF DISP  (40EA/CA)

## (undated) DEVICE — SET LEADWIRE 5 LEAD BEDSIDE DISPOSABLE ECG (1SET OF 5/EA)

## (undated) DEVICE — SYRINGE NON SAFETY 10 CC 20 GA X 1-1/2 IN (100/BX 4BX/CA)

## (undated) DEVICE — CANNULA SUB-TENONS ANESTH. 1.1X25MM 19GAX1IN (10EA/SP)

## (undated) DEVICE — MASK OXYGEN VNYL ADLT MED CONC WITH 7 FOOT TUBING  - (50EA/CA)

## (undated) DEVICE — ADHESIVE MASTISOL - (48/BX)

## (undated) DEVICE — GLOVE BIOGEL PI INDICATOR SZ 7.5 SURGICAL PF LF -(50/BX 4BX/CA)

## (undated) DEVICE — SUTURE 5-0 VICRYL D/A S-14 18 (12PK/BX)"

## (undated) DEVICE — TOWEL STOP TIMEOUT SAFETY FLAG (40EA/CA)

## (undated) DEVICE — SHIELD OPTH AL GRTR CVR FOX (50EA/BX)

## (undated) DEVICE — SPONGE GAUZE NON-STERILE 4X4 - (2000/CA 10PK/CA)

## (undated) DEVICE — GLOVE BIOGEL SZ 7.5 SURGICAL PF LTX - (50PR/BX 4BX/CA)

## (undated) DEVICE — TUBE CONNECTING SUCTION - CLEAR PLASTIC STERILE 72 IN (50EA/CA)

## (undated) DEVICE — CANNULA DIVIDED ADULT CO2 - SAMPLE W/FEMALE CONNCT (25/CA)

## (undated) DEVICE — LACTATED RINGERS INJ 1000 ML - (14EA/CA 60CA/PF)

## (undated) DEVICE — PACK VITRECTOMY (1EA/CA)

## (undated) DEVICE — SLEEVE VASO CALF MED - (10PR/CA)

## (undated) DEVICE — CANNULA O2 COMFORT SOFT EAR ADULT 7 FT TUBING (50/CA)

## (undated) DEVICE — WATER IRRIGATION STERILE 1000ML (12EA/CA)

## (undated) DEVICE — SUTURE EYE

## (undated) DEVICE — GOWN WARMING STANDARD FLEX - (30/CA)

## (undated) DEVICE — PACK KO - (3/CA)

## (undated) DEVICE — SODIUM CHL IRRIGATION 0.9% 1000ML (12EA/CA)

## (undated) DEVICE — PAD EYE GAUZE COVERED OVAL 1 5/8 X 2 5/8" STERILE"

## (undated) DEVICE — SUCTION INSTRUMENT YANKAUER BULBOUS TIP W/O VENT (50EA/CA)

## (undated) DEVICE — CANNULA SOFT TIP STERILE SINGLE USE VITRECTOMY 23GA 0.8MM (10EA/BX)

## (undated) DEVICE — SYRINGE 30 ML LL (56/BX)

## (undated) DEVICE — SUTURE 0 SILK 12 X 18 (36PK/BX)

## (undated) DEVICE — SUTURE 4-0 SILK G-3 (12EA/BX)

## (undated) DEVICE — SUTURE 6-0 PLAIN GUT G-1 D/A 18 (12PK/BX)"

## (undated) DEVICE — FORCEP ENDOGRASP 23GA DISP - (6/BX) REVOLTION

## (undated) DEVICE — NEEDLE FILTER ASPIRATION 18 GA X 1 1/2 IN (100EA/BX)

## (undated) DEVICE — CANISTER SUCTION RIGID RED 1500CC (40EA/CA)

## (undated) DEVICE — SUTURE 10-0 ETHILON TG160-4-3M (12PK/BX)

## (undated) DEVICE — FORCEP ILM 23GA DISP REVOLUT. - (6/BX)

## (undated) DEVICE — SENSOR OXIMETER ADULT SPO2 RD SET (20EA/BX)

## (undated) DEVICE — APPLICATOR COTTONTIP 3 IN - STERILE (10EA/PK 100PK/CA)

## (undated) DEVICE — GAUZE FLUFF STERILE 2-PLY 36 X 36 (100EA/CA)

## (undated) DEVICE — SOLUTION PREP PVP IODINE 3/4 OZ POUCH PACKET CONTAINER STERILE LATEX FREE

## (undated) DEVICE — Device

## (undated) DEVICE — SUTURE 5-0 ETHILON RD-1 EYE 18 (12PK/BX)"

## (undated) DEVICE — TUBING CLEARLINK DUO-VENT - C-FLO (48EA/CA)

## (undated) DEVICE — MASK AIRWAY FLEXIBLE SINGLE-USE SIZE 4 ADULTS (10EA/BX)

## (undated) DEVICE — PACK DUO VISC LARGE

## (undated) DEVICE — GLOVES, #7 1/2 BIOGEL M

## (undated) DEVICE — PACK VITRECTOMY 23G 10K BEVELED (1EA/BX)